# Patient Record
Sex: FEMALE | Race: WHITE | Employment: UNEMPLOYED | URBAN - METROPOLITAN AREA
[De-identification: names, ages, dates, MRNs, and addresses within clinical notes are randomized per-mention and may not be internally consistent; named-entity substitution may affect disease eponyms.]

---

## 2020-05-04 ENCOUNTER — OFFICE VISIT (OUTPATIENT)
Dept: SURGERY | Facility: CLINIC | Age: 40
End: 2020-05-04
Payer: COMMERCIAL

## 2020-05-04 VITALS — DIASTOLIC BLOOD PRESSURE: 97 MMHG | HEART RATE: 89 BPM | SYSTOLIC BLOOD PRESSURE: 185 MMHG

## 2020-05-04 DIAGNOSIS — N20.0 RENAL CALCULUS, LEFT: ICD-10-CM

## 2020-05-04 DIAGNOSIS — N13.30 HYDRONEPHROSIS OF LEFT KIDNEY: ICD-10-CM

## 2020-05-04 DIAGNOSIS — R82.90 URINE FINDING: Primary | ICD-10-CM

## 2020-05-04 PROCEDURE — 99203 OFFICE O/P NEW LOW 30 MIN: CPT | Performed by: UROLOGY

## 2020-05-04 PROCEDURE — 81003 URINALYSIS AUTO W/O SCOPE: CPT | Performed by: UROLOGY

## 2020-05-04 RX ORDER — INSULIN DETEMIR 100 [IU]/ML
INJECTION, SOLUTION SUBCUTANEOUS
COMMUNITY
Start: 2020-03-16 | End: 2020-07-10

## 2020-05-04 RX ORDER — HYDROCHLOROTHIAZIDE 25 MG/1
25 TABLET ORAL DAILY
COMMUNITY
Start: 2019-12-22 | End: 2020-08-17

## 2020-05-04 RX ORDER — LOSARTAN POTASSIUM 100 MG/1
TABLET ORAL
Status: ON HOLD | COMMUNITY
Start: 2019-12-22 | End: 2020-07-22

## 2020-05-04 RX ORDER — PEN NEEDLE, DIABETIC 32GX 5/32"
NEEDLE, DISPOSABLE MISCELLANEOUS
COMMUNITY
Start: 2020-03-15 | End: 2021-01-15

## 2020-05-04 RX ORDER — ERGOCALCIFEROL 1.25 MG/1
50000 CAPSULE ORAL
COMMUNITY
Start: 2020-02-24 | End: 2020-10-16

## 2020-05-04 NOTE — PROGRESS NOTES
Rooming Clinician:     Besys Burger is a 37year old female. Patient presents with:  Kidney Problem: staghorn kidney stone.   Nephrsotomy tube done 1/12/20  Kidney Stones / Ureteral Stone  Pain: No   left  Nausea: No    Vomiting: No    Previous [address] Smoking status: Not on file    Alcohol use: Not on file    Drug use: Not on file       REVIEW OF SYSTEMS:     GENERAL HEALTH: feels well otherwise  SKIN: denies any unusual skin lesions or rashes  RESPIRATORY: denies shortness of breath with exertion  CA

## 2020-05-07 ENCOUNTER — TELEPHONE (OUTPATIENT)
Dept: SURGERY | Facility: CLINIC | Age: 40
End: 2020-05-07

## 2020-05-07 RX ORDER — CIPROFLOXACIN 500 MG/1
500 TABLET, FILM COATED ORAL 2 TIMES DAILY
Qty: 20 TABLET | Refills: 0 | Status: SHIPPED | OUTPATIENT
Start: 2020-05-07 | End: 2020-05-17

## 2020-05-07 NOTE — TELEPHONE ENCOUNTER
Spoke with patient using the language line for interpretation Twyla Pretty DV#[id number]). Informed pt that her urine culture from her nephrostomy tube was positive for infection. Verified she is not allergic to Cipro.   She needs to start Cipro 500 mg 1 twice/day fo

## 2020-05-07 NOTE — TELEPHONE ENCOUNTER
----- Message from Alexander Horton MD sent at 5/7/2020 12:23 PM CDT -----  Your recent the nephrostomy tube shows E. coli bacteria and Is abnormal.  Recommend ciprofloxacin 500 mg 1 p.o twice daily for 10 days.     Sincerely,  Loretta Chan MD

## 2020-05-11 ENCOUNTER — HOSPITAL ENCOUNTER (OUTPATIENT)
Dept: CT IMAGING | Facility: HOSPITAL | Age: 40
Discharge: HOME | End: 2020-05-11
Attending: UROLOGY
Payer: COMMERCIAL

## 2020-05-11 ENCOUNTER — TELEPHONE (OUTPATIENT)
Dept: SURGERY | Facility: CLINIC | Age: 40
End: 2020-05-11

## 2020-05-11 DIAGNOSIS — N20.0 RENAL CALCULUS, LEFT: ICD-10-CM

## 2020-05-11 DIAGNOSIS — N13.30 HYDRONEPHROSIS OF LEFT KIDNEY: ICD-10-CM

## 2020-05-11 PROCEDURE — 76377 3D RENDER W/INTRP POSTPROCES: CPT | Performed by: UROLOGY

## 2020-05-11 PROCEDURE — 82565 ASSAY OF CREATININE: CPT

## 2020-05-11 PROCEDURE — 74178 CT ABD&PLV WO CNTR FLWD CNTR: CPT | Performed by: UROLOGY

## 2020-05-11 NOTE — TELEPHONE ENCOUNTER
Phoned patient to relay MD's message and recommendations, \"Your recent CT urogram shows a very large left renal staghorn calculus with other areas of calcification within the left kidney as well as a cyst in the upper pole of the left kidney is abnormal.

## 2020-05-18 ENCOUNTER — OFFICE VISIT (OUTPATIENT)
Dept: SURGERY | Facility: CLINIC | Age: 40
End: 2020-05-18
Payer: COMMERCIAL

## 2020-05-18 ENCOUNTER — TELEPHONE (OUTPATIENT)
Dept: SURGERY | Facility: CLINIC | Age: 40
End: 2020-05-18

## 2020-05-18 VITALS — TEMPERATURE: 99 F | HEART RATE: 77 BPM | SYSTOLIC BLOOD PRESSURE: 147 MMHG | DIASTOLIC BLOOD PRESSURE: 73 MMHG

## 2020-05-18 DIAGNOSIS — N20.0 RENAL CALCULUS, LEFT: Primary | ICD-10-CM

## 2020-05-18 PROCEDURE — 99213 OFFICE O/P EST LOW 20 MIN: CPT | Performed by: UROLOGY

## 2020-05-18 RX ORDER — CIPROFLOXACIN 500 MG/1
TABLET, FILM COATED ORAL 2 TIMES DAILY
COMMUNITY
End: 2020-07-13

## 2020-05-18 NOTE — TELEPHONE ENCOUNTER
JEANNETTE at [address] [place] [address] to coordinate care for this patient. She needs to see a pcp in the [place] system. She needs a nuclear renal scan and also a surgical procedure Left PCNL scheduled.

## 2020-05-18 NOTE — PROGRESS NOTES
Rooming Clinician:     Alexis Choe is a 37year old female. Patient presents with: Follow - Up: to discuss CT result; left nephrostomy tube      HPI:     Patient returns with her  who is her .   I have been able to rev and denies heartburn  : see HPI  NEURO: no sensory or motor complaint    EXAM:     /73 (BP Location: Right arm, Patient Position: Sitting, Cuff Size: large)   Pulse 77   Temp 98.9 °F (37.2 °C) (Oral)   GENERAL: well developed, well nourished,in no measures 2.2 cm. Left perinephric fluid is noted. ADRENALS:  No mass or enlargement. URINARY BLADDER:  Bladder is unremarkable. URETERS:  No filling defects. LIVER:  No enlargement, atrophy, abnormal density, or significant focal lesion.   BILIARY:  No v interventional radiologist just prior to the procedure.   At the time of the procedure the percutaneous nephrostomy site is enlarged in order to pass a larger nephroscope into the kidney to visualize the stone and fragment the stone into pieces using variou

## 2020-05-19 ENCOUNTER — TELEPHONE (OUTPATIENT)
Dept: SURGERY | Facility: CLINIC | Age: 40
End: 2020-05-19

## 2020-05-19 DIAGNOSIS — N20.0 KIDNEY STONES: Primary | ICD-10-CM

## 2020-05-19 NOTE — TELEPHONE ENCOUNTER
Per Noe TIERNEY renal DMSA spect test ordered yesterday is not done at [address] [place] [address] or [facility], states this test stopped being done many years ago, asking for other type of test to be ordered. Please advise thank you.

## 2020-05-19 NOTE — TELEPHONE ENCOUNTER
Spoke with Cecy Aguilar at [facility] [phone]. He was informed that the patient needs a pcp associated with [facility] for medical clearance prior to scheduling her surgery. Verbalized understanding. He will call back with this information.

## 2020-05-20 NOTE — TELEPHONE ENCOUNTER
Spoke with Alexys Sanchez at [facility]. He is setting the patient up with a pcp at [facility] and will let us know when they decide. I told him we would get back to him about the nuclear scan that Dr She Gunter ordered which they do not do anymore.   Waiting to hear

## 2020-05-20 NOTE — TELEPHONE ENCOUNTER
Christiano with [facility] is requesting to speak to Salvatore to set up care coordination.  Please advise

## 2020-06-01 NOTE — TELEPHONE ENCOUNTER
I pended an order for a NM lasix renogram. Please sign the order if you approve. Tasked to [employer]

## 2020-06-16 ENCOUNTER — HOSPITAL ENCOUNTER (OUTPATIENT)
Dept: NUCLEAR MEDICINE | Facility: HOSPITAL | Age: 40
End: 2020-06-16
Attending: UROLOGY
Payer: COMMERCIAL

## 2020-06-17 ENCOUNTER — HOSPITAL ENCOUNTER (OUTPATIENT)
Dept: NUCLEAR MEDICINE | Facility: HOSPITAL | Age: 40
Discharge: HOME | End: 2020-06-17
Attending: UROLOGY
Payer: COMMERCIAL

## 2020-06-17 DIAGNOSIS — N20.0 KIDNEY STONES: ICD-10-CM

## 2020-06-17 PROCEDURE — 78708 K FLOW/FUNCT IMAGE W/DRUG: CPT | Performed by: UROLOGY

## 2020-06-17 RX ORDER — FUROSEMIDE 10 MG/ML
INJECTION INTRAMUSCULAR; INTRAVENOUS
Status: COMPLETED
Start: 2020-06-17 | End: 2020-06-17

## 2020-06-17 RX ORDER — FUROSEMIDE 10 MG/ML
40 INJECTION INTRAMUSCULAR; INTRAVENOUS ONCE
Status: COMPLETED | OUTPATIENT
Start: 2020-06-17 | End: 2020-06-17

## 2020-06-17 RX ADMIN — FUROSEMIDE 40 MG: 10 INJECTION INTRAMUSCULAR; INTRAVENOUS at 09:38:00

## 2020-06-18 ENCOUNTER — TELEPHONE (OUTPATIENT)
Dept: SURGERY | Facility: CLINIC | Age: 40
End: 2020-06-18

## 2020-06-18 NOTE — TELEPHONE ENCOUNTER
RN called the 's patient in response to his call and to arrange for an appointment he is requesting. Left message to call us back. RN also trying to reach this patient to relay the 6/5 NMR with Lasix result. No details given.  See result below:

## 2020-06-18 NOTE — TELEPHONE ENCOUNTER
Pt's  called. Pt has not been able to find a primary care physician in [place] due to the insurance. When should pt see Dr. She Gunter for an appointment.  Call to advise

## 2020-06-22 NOTE — TELEPHONE ENCOUNTER
Spoke to patient. Agreeable to set up and appointment on 6/25 Thursday at 1045am with Dr Cheryl Healy. RN to relay renal scan result but patient wanting to come to office and discuss it with MD instead.  Note, STIVEN Corado spoke to patient and interpreted her aaron

## 2020-06-25 ENCOUNTER — TELEPHONE (OUTPATIENT)
Dept: SURGERY | Facility: CLINIC | Age: 40
End: 2020-06-25

## 2020-06-25 ENCOUNTER — OFFICE VISIT (OUTPATIENT)
Dept: SURGERY | Facility: CLINIC | Age: 40
End: 2020-06-25
Payer: COMMERCIAL

## 2020-06-25 VITALS — HEART RATE: 90 BPM | DIASTOLIC BLOOD PRESSURE: 92 MMHG | SYSTOLIC BLOOD PRESSURE: 160 MMHG

## 2020-06-25 DIAGNOSIS — N20.0 RENAL CALCULUS, LEFT: ICD-10-CM

## 2020-06-25 DIAGNOSIS — R82.90 URINE FINDING: Primary | ICD-10-CM

## 2020-06-25 PROCEDURE — 99213 OFFICE O/P EST LOW 20 MIN: CPT | Performed by: UROLOGY

## 2020-06-25 PROCEDURE — 81003 URINALYSIS AUTO W/O SCOPE: CPT | Performed by: UROLOGY

## 2020-06-25 NOTE — PROGRESS NOTES
Rooming Clinician:     Brittney Acosta is a 37year old female. Patient presents with:   Follow - Up: Patient presents today for test results    [id number]      HPI:     Patient comes back to the office with her  and vi reviewed. No pertinent surgical history.    Social History:  Social History    Tobacco Use      Smoking status: Not on file    Alcohol use: Not on file    Drug use: Not on file       REVIEW OF SYSTEMS:     GENERAL HEALTH: feels well otherwise  SKIN: denies CONCLUSION:  1. Asymmetric findings consistent with compromised function of the left kidney. 2. A left nephrostomy tube is noted. There is no hydronephrosis on either side. 3. Continued clinical correlation recommended.     Dictated by: Travis Montenegro MD on 6

## 2020-06-25 NOTE — TELEPHONE ENCOUNTER
----- Message from Rashida Skinner MD sent at 6/18/2020 11:10 AM CDT -----  Your recent nuclear renal scan shows that there is significant decrease in function of the left kidney. 74% of total renal output based on the right kidney only 26% on the left.

## 2020-06-26 ENCOUNTER — TELEPHONE (OUTPATIENT)
Dept: SURGERY | Facility: CLINIC | Age: 40
End: 2020-06-26

## 2020-06-26 DIAGNOSIS — N20.0 KIDNEY STONE ON LEFT SIDE: Primary | ICD-10-CM

## 2020-06-26 NOTE — TELEPHONE ENCOUNTER
Scheduled July 22, 2020 at [facility] Percutaneous left nephrostolithotripsy. Need to contact [phone] and Patient. Patient will need voided urine culture and from left nephromostomy tube 7 days prior.

## 2020-06-29 NOTE — TELEPHONE ENCOUNTER
Spoke to Patient adv surgery is scheduled for 07/22/20 went over instructions.   Patient verbalized understanding

## 2020-06-30 RX ORDER — CIPROFLOXACIN 500 MG/1
500 TABLET, FILM COATED ORAL 2 TIMES DAILY
Qty: 20 TABLET | Refills: 1 | Status: SHIPPED | OUTPATIENT
Start: 2020-06-30 | End: 2020-07-13

## 2020-07-01 ENCOUNTER — TELEPHONE (OUTPATIENT)
Dept: SURGERY | Facility: CLINIC | Age: 40
End: 2020-07-01

## 2020-07-01 NOTE — TELEPHONE ENCOUNTER
----- Message from Sridhar Singh MD sent at 6/30/2020 11:12 AM CDT -----  Your recent urine culture shows E. coli and is abnormal.  Recommend ciprofloxacin 500 mg 1 p.o. twice daily for 10 days.   Culture will need to be repeated from both the voided ur

## 2020-07-01 NOTE — TELEPHONE ENCOUNTER
Spoke with patient thru the language line. Interpreting was [place] #[id number]. I informed her that her urine culture was positive for infection and she needs to start Cipro 500 mg 1 twice/day for 10 days. Answered questions. Verbalized understanding.

## 2020-07-02 ENCOUNTER — TELEPHONE (OUTPATIENT)
Dept: SURGERY | Facility: CLINIC | Age: 40
End: 2020-07-02

## 2020-07-02 DIAGNOSIS — N20.0 KIDNEY STONE: Primary | ICD-10-CM

## 2020-07-10 PROBLEM — I10 ESSENTIAL HYPERTENSION: Status: ACTIVE | Noted: 2020-07-10

## 2020-07-10 PROBLEM — Z79.4 CONTROLLED TYPE 2 DIABETES MELLITUS WITH HYPERGLYCEMIA, WITH LONG-TERM CURRENT USE OF INSULIN (HCC): Status: ACTIVE | Noted: 2020-07-10

## 2020-07-10 PROBLEM — E11.65 CONTROLLED TYPE 2 DIABETES MELLITUS WITH HYPERGLYCEMIA, WITH LONG-TERM CURRENT USE OF INSULIN (HCC): Status: ACTIVE | Noted: 2020-07-10

## 2020-07-10 RX ORDER — AMLODIPINE BESYLATE 10 MG/1
10 TABLET ORAL DAILY
COMMUNITY
Start: 2020-05-12 | End: 2020-08-17

## 2020-07-11 NOTE — ASSESSMENT & PLAN NOTE
As for her Diabetes, it is no significant medication side effects noted, borderline controlled. Recommendations are: continue present meds, lose weight by increased dietary compliance and exercise and will check labs as ordered.   Working on Zohra Adams

## 2020-07-11 NOTE — ASSESSMENT & PLAN NOTE
Elevated today, white coat syndrome as source, but will add metroprolol 12.5 dasily for now and for 2 weeks post procedure.    Blood Pressure and Cardiac Medications          amLODIPine Besylate 10 MG Oral Tab    losartan 100 MG Oral Tab

## 2020-07-13 ENCOUNTER — APPOINTMENT (OUTPATIENT)
Dept: LAB | Facility: HOSPITAL | Age: 40
End: 2020-07-13
Attending: FAMILY MEDICINE
Payer: COMMERCIAL

## 2020-07-13 ENCOUNTER — NURSE ONLY (OUTPATIENT)
Dept: SURGERY | Facility: CLINIC | Age: 40
End: 2020-07-13
Payer: COMMERCIAL

## 2020-07-13 ENCOUNTER — OFFICE VISIT (OUTPATIENT)
Dept: FAMILY MEDICINE CLINIC | Facility: CLINIC | Age: 40
End: 2020-07-13
Payer: COMMERCIAL

## 2020-07-13 VITALS
HEIGHT: 65.75 IN | TEMPERATURE: 99 F | WEIGHT: 181 LBS | DIASTOLIC BLOOD PRESSURE: 80 MMHG | SYSTOLIC BLOOD PRESSURE: 141 MMHG | BODY MASS INDEX: 29.44 KG/M2 | HEART RATE: 81 BPM | RESPIRATION RATE: 16 BRPM

## 2020-07-13 DIAGNOSIS — I10 ESSENTIAL HYPERTENSION: ICD-10-CM

## 2020-07-13 DIAGNOSIS — N20.0 KIDNEY STONE: Primary | ICD-10-CM

## 2020-07-13 DIAGNOSIS — E78.2 MIXED HYPERLIPIDEMIA: ICD-10-CM

## 2020-07-13 DIAGNOSIS — E11.65 CONTROLLED TYPE 2 DIABETES MELLITUS WITH HYPERGLYCEMIA, WITH LONG-TERM CURRENT USE OF INSULIN (HCC): ICD-10-CM

## 2020-07-13 DIAGNOSIS — N20.0 LEFT NEPHROLITHIASIS: ICD-10-CM

## 2020-07-13 DIAGNOSIS — Z13.29 SCREENING FOR THYROID DISORDER: ICD-10-CM

## 2020-07-13 DIAGNOSIS — Z01.818 PREOPERATIVE CLEARANCE: ICD-10-CM

## 2020-07-13 DIAGNOSIS — Z79.4 CONTROLLED TYPE 2 DIABETES MELLITUS WITH HYPERGLYCEMIA, WITH LONG-TERM CURRENT USE OF INSULIN (HCC): ICD-10-CM

## 2020-07-13 DIAGNOSIS — N20.0 LEFT NEPHROLITHIASIS: Primary | ICD-10-CM

## 2020-07-13 LAB
ALBUMIN SERPL-MCNC: 3.6 G/DL (ref 3.4–5)
ALBUMIN/GLOB SERPL: 0.8 {RATIO} (ref 1–2)
ALP LIVER SERPL-CCNC: 68 U/L (ref 37–98)
ALT SERPL-CCNC: 19 U/L (ref 13–56)
ANION GAP SERPL CALC-SCNC: 7 MMOL/L (ref 0–18)
AST SERPL-CCNC: 9 U/L (ref 15–37)
ATRIAL RATE: 73 BPM
BASOPHILS # BLD AUTO: 0.07 X10(3) UL (ref 0–0.2)
BASOPHILS NFR BLD AUTO: 0.7 %
BILIRUB SERPL-MCNC: 0.2 MG/DL (ref 0.1–2)
BUN BLD-MCNC: 20 MG/DL (ref 7–18)
BUN/CREAT SERPL: 20.8 (ref 10–20)
CALCIUM BLD-MCNC: 9.3 MG/DL (ref 8.5–10.1)
CHLORIDE SERPL-SCNC: 103 MMOL/L (ref 98–112)
CHOLEST SMN-MCNC: 190 MG/DL (ref ?–200)
CO2 SERPL-SCNC: 26 MMOL/L (ref 21–32)
CREAT BLD-MCNC: 0.96 MG/DL (ref 0.55–1.02)
CREAT UR-SCNC: 40.3 MG/DL
DEPRECATED RDW RBC AUTO: 46.2 FL (ref 35.1–46.3)
EOSINOPHIL # BLD AUTO: 0.42 X10(3) UL (ref 0–0.7)
EOSINOPHIL NFR BLD AUTO: 4.2 %
ERYTHROCYTE [DISTWIDTH] IN BLOOD BY AUTOMATED COUNT: 14.6 % (ref 11–15)
EST. AVERAGE GLUCOSE BLD GHB EST-MCNC: 197 MG/DL (ref 68–126)
GLOBULIN PLAS-MCNC: 4.8 G/DL (ref 2.8–4.4)
GLUCOSE BLD-MCNC: 169 MG/DL (ref 70–99)
HBA1C MFR BLD HPLC: 8.5 % (ref ?–5.7)
HCT VFR BLD AUTO: 40.2 % (ref 35–48)
HDLC SERPL-MCNC: 36 MG/DL (ref 40–59)
HGB BLD-MCNC: 12.9 G/DL (ref 12–16)
IMM GRANULOCYTES # BLD AUTO: 0.05 X10(3) UL (ref 0–1)
IMM GRANULOCYTES NFR BLD: 0.5 %
LDLC SERPL CALC-MCNC: 98 MG/DL (ref ?–100)
LYMPHOCYTES # BLD AUTO: 2.34 X10(3) UL (ref 1–4)
LYMPHOCYTES NFR BLD AUTO: 23.1 %
M PROTEIN MFR SERPL ELPH: 8.4 G/DL (ref 6.4–8.2)
MCH RBC QN AUTO: 27.7 PG (ref 26–34)
MCHC RBC AUTO-ENTMCNC: 32.1 G/DL (ref 31–37)
MCV RBC AUTO: 86.5 FL (ref 80–100)
MICROALBUMIN UR-MCNC: 66.2 MG/DL
MICROALBUMIN/CREAT 24H UR-RTO: 1642.7 UG/MG (ref ?–30)
MONOCYTES # BLD AUTO: 0.75 X10(3) UL (ref 0.1–1)
MONOCYTES NFR BLD AUTO: 7.4 %
NEUTROPHILS # BLD AUTO: 6.49 X10 (3) UL (ref 1.5–7.7)
NEUTROPHILS # BLD AUTO: 6.49 X10(3) UL (ref 1.5–7.7)
NEUTROPHILS NFR BLD AUTO: 64.1 %
NONHDLC SERPL-MCNC: 154 MG/DL (ref ?–130)
OSMOLALITY SERPL CALC.SUM OF ELEC: 289 MOSM/KG (ref 275–295)
P AXIS: 22 DEGREES
P-R INTERVAL: 138 MS
PATIENT FASTING Y/N/NP: YES
PATIENT FASTING Y/N/NP: YES
PLATELET # BLD AUTO: 333 10(3)UL (ref 150–450)
POTASSIUM SERPL-SCNC: 3.8 MMOL/L (ref 3.5–5.1)
Q-T INTERVAL: 396 MS
QRS DURATION: 90 MS
QTC CALCULATION (BEZET): 436 MS
R AXIS: -8 DEGREES
RBC # BLD AUTO: 4.65 X10(6)UL (ref 3.8–5.3)
SODIUM SERPL-SCNC: 136 MMOL/L (ref 136–145)
T AXIS: 69 DEGREES
TRIGL SERPL-MCNC: 279 MG/DL (ref 30–149)
TSI SER-ACNC: 1.69 MIU/ML (ref 0.36–3.74)
VENTRICULAR RATE: 73 BPM
VLDLC SERPL CALC-MCNC: 56 MG/DL (ref 0–30)
WBC # BLD AUTO: 10.1 X10(3) UL (ref 4–11)

## 2020-07-13 PROCEDURE — 80053 COMPREHEN METABOLIC PANEL: CPT

## 2020-07-13 PROCEDURE — 80061 LIPID PANEL: CPT

## 2020-07-13 PROCEDURE — 83036 HEMOGLOBIN GLYCOSYLATED A1C: CPT

## 2020-07-13 PROCEDURE — 82570 ASSAY OF URINE CREATININE: CPT

## 2020-07-13 PROCEDURE — 85025 COMPLETE CBC W/AUTO DIFF WBC: CPT

## 2020-07-13 PROCEDURE — 84443 ASSAY THYROID STIM HORMONE: CPT

## 2020-07-13 PROCEDURE — 93005 ELECTROCARDIOGRAM TRACING: CPT

## 2020-07-13 PROCEDURE — 36415 COLL VENOUS BLD VENIPUNCTURE: CPT

## 2020-07-13 PROCEDURE — 82043 UR ALBUMIN QUANTITATIVE: CPT

## 2020-07-13 PROCEDURE — 99243 OFF/OP CNSLTJ NEW/EST LOW 30: CPT | Performed by: FAMILY MEDICINE

## 2020-07-13 PROCEDURE — 93010 ELECTROCARDIOGRAM REPORT: CPT | Performed by: INTERNAL MEDICINE

## 2020-07-13 PROCEDURE — 51700 IRRIGATION OF BLADDER: CPT | Performed by: UROLOGY

## 2020-07-13 RX ORDER — METOPROLOL SUCCINATE 25 MG/1
12.5 TABLET, EXTENDED RELEASE ORAL DAILY
Qty: 15 TABLET | Refills: 0 | Status: SHIPPED | OUTPATIENT
Start: 2020-07-13 | End: 2020-08-12

## 2020-07-13 NOTE — PATIENT INSTRUCTIONS
Levemir 25 units, just taek 12 units night before and morning of procedure, and hold insulin meal time day of procedure.     Medication Sig   • amLODIPine Besylate 10 MG Oral Tab OK to continue   • Insulin Aspart Pen (NOVOLOG FLEXPEN) 100 UNIT/ML Subcutaneo

## 2020-07-13 NOTE — PROGRESS NOTES
New pt, out of work 6 months because of worse kidney stones starting with infections in January, ff work as an  since. Previous doctor no longer covered.  Dr Marina Estrella in [place] was previous doctor

## 2020-07-14 NOTE — H&P
Terri Wilson is a 37year old female who presents for a pre-operative physical exam at the request of Dr. Elliot Parsons for evaluation of preoperative risk.  Patient is to have left kidney stone removal, to be done by Dr. Elliot Parsons  at [facility] on 7/2 07/13/2020 11:47 AM    HCT 40.2 07/13/2020 11:47 AM    .0 07/13/2020 11:47 AM         CMP  (most recent labs)   Lab Results   Component Value Date/Time     (H) 07/13/2020 11:47 AM    BUN 20 (H) 07/13/2020 11:47 AM    CREATSERUM 0.96 07/13/202 losartan 100 MG Oral Tab                 Relevant Medications    amLODIPine Besylate 10 MG Oral Tab    Metoprolol Succinate ER 25 MG Oral Tablet 24 Hr    Other Relevant Orders    EKG 12-LEAD (Completed)    Mixed hyperlipidemia    Overview     LDL 98, trigs FREE T4 (Completed)       reasoabble control of Diabetes Mellitus, ok for surgery, but Therapuetic Lifestyle Change recommended, follow up in 1 months,   Half levelir noight before and day of, and reassess soon  BP elevated, may be white coat, but will add

## 2020-07-15 RX ORDER — SODIUM CHLORIDE, SODIUM LACTATE, POTASSIUM CHLORIDE, CALCIUM CHLORIDE 600; 310; 30; 20 MG/100ML; MG/100ML; MG/100ML; MG/100ML
INJECTION, SOLUTION INTRAVENOUS CONTINUOUS
Status: CANCELLED | OUTPATIENT
Start: 2020-07-15

## 2020-07-15 RX ORDER — ACETAMINOPHEN 500 MG
1000 TABLET ORAL ONCE
Status: CANCELLED | OUTPATIENT
Start: 2020-07-15 | End: 2020-07-15

## 2020-07-17 ENCOUNTER — TELEPHONE (OUTPATIENT)
Dept: SURGERY | Facility: CLINIC | Age: 40
End: 2020-07-17

## 2020-07-17 NOTE — TELEPHONE ENCOUNTER
----- Message from Roseann Conde MD sent at 7/15/2020  9:15 PM CDT -----  Your recent Urine culture shows E. Coli from urine. Nephrostomy tube is negative. is abnormal.  Recommend Keflex 500 mg one POQ ID for 14 days.     Sincerely,  Jessica Farah MD

## 2020-07-17 NOTE — TELEPHONE ENCOUNTER
RN called this patient to relay MD's message and recommendations, \"Your recent Urine culture shows E. Coli from urine. Nephrostomy tube is negative. is abnormal.  Recommend Keflex 500 mg one POQ ID for 14 days.  \" Patient agreeable and verbalized Lolita Appiah

## 2020-07-20 ENCOUNTER — LAB ENCOUNTER (OUTPATIENT)
Dept: LAB | Facility: HOSPITAL | Age: 40
End: 2020-07-20
Attending: UROLOGY
Payer: OTHER GOVERNMENT

## 2020-07-20 DIAGNOSIS — N20.0 KIDNEY STONE ON LEFT SIDE: ICD-10-CM

## 2020-07-20 RX ORDER — CEPHALEXIN 500 MG/1
500 CAPSULE ORAL 2 TIMES DAILY
Qty: 14 CAPSULE | Refills: 0 | Status: SHIPPED | OUTPATIENT
Start: 2020-07-20 | End: 2020-07-27

## 2020-07-20 NOTE — TELEPHONE ENCOUNTER
Spoke with spouse and answered questions. Rx for Keflex was resent to pharmacy. Verbalized understanding.

## 2020-07-21 LAB — SARS-COV-2 RNA RESP QL NAA+PROBE: NOT DETECTED

## 2020-07-21 NOTE — H&P
Patient was seen in the office with her  and via a  we discussed the results of the nuclear renal scan which show a 26% function from the left kidney recently.   Therefore most of the renal clearance is occurring from the righ on file         REVIEW OF SYSTEMS:      GENERAL HEALTH: feels well otherwise  SKIN: denies any unusual skin lesions or rashes  RESPIRATORY: denies shortness of breath with exertion  CARDIOVASCULAR: denies chest pain on exertion  GI: denies abdominal pain a either side. 3. Continued clinical correlation recommended.     Dictated by: Galina Gallagher MD on 6/17/2020 at 11:10 AM     Finalized by: Galina Gallagher MD on 6/17/2020 at 11:15 AM              ASSESSMENT:      Left renal staghorn calculus with percutaneous left

## 2020-07-22 ENCOUNTER — ANESTHESIA (OUTPATIENT)
Dept: SURGERY | Facility: HOSPITAL | Age: 40
End: 2020-07-22
Payer: COMMERCIAL

## 2020-07-22 ENCOUNTER — TELEPHONE (OUTPATIENT)
Dept: SURGERY | Facility: CLINIC | Age: 40
End: 2020-07-22

## 2020-07-22 ENCOUNTER — HOSPITAL ENCOUNTER (OUTPATIENT)
Dept: INTERVENTIONAL RADIOLOGY/VASCULAR | Facility: HOSPITAL | Age: 40
Discharge: HOME | End: 2020-07-22
Attending: UROLOGY
Payer: COMMERCIAL

## 2020-07-22 ENCOUNTER — APPOINTMENT (OUTPATIENT)
Dept: GENERAL RADIOLOGY | Facility: HOSPITAL | Age: 40
End: 2020-07-22
Attending: UROLOGY
Payer: COMMERCIAL

## 2020-07-22 ENCOUNTER — HOSPITAL ENCOUNTER (OUTPATIENT)
Facility: HOSPITAL | Age: 40
Discharge: HOME | End: 2020-07-23
Attending: UROLOGY | Admitting: UROLOGY
Payer: COMMERCIAL

## 2020-07-22 ENCOUNTER — ANESTHESIA EVENT (OUTPATIENT)
Dept: SURGERY | Facility: HOSPITAL | Age: 40
End: 2020-07-22
Payer: COMMERCIAL

## 2020-07-22 DIAGNOSIS — N20.0 KIDNEY STONE ON LEFT SIDE: Primary | ICD-10-CM

## 2020-07-22 DIAGNOSIS — N20.0 KIDNEY STONE ON LEFT SIDE: ICD-10-CM

## 2020-07-22 LAB
ANION GAP SERPL CALC-SCNC: 9 MMOL/L (ref 0–18)
BUN BLD-MCNC: 14 MG/DL (ref 7–18)
BUN/CREAT SERPL: 16.1 (ref 10–20)
CALCIUM BLD-MCNC: 7.9 MG/DL (ref 8.5–10.1)
CHLORIDE SERPL-SCNC: 107 MMOL/L (ref 98–112)
CO2 SERPL-SCNC: 20 MMOL/L (ref 21–32)
CREAT BLD-MCNC: 0.87 MG/DL (ref 0.55–1.02)
GLUCOSE BLD-MCNC: 157 MG/DL (ref 70–99)
GLUCOSE BLD-MCNC: 157 MG/DL (ref 70–99)
GLUCOSE BLD-MCNC: 173 MG/DL (ref 70–99)
GLUCOSE BLD-MCNC: 208 MG/DL (ref 70–99)
GLUCOSE BLD-MCNC: 237 MG/DL (ref 70–99)
OSMOLALITY SERPL CALC.SUM OF ELEC: 286 MOSM/KG (ref 275–295)
POCT LOT NUMBER: NORMAL
POCT URINE PREGNANCY: NEGATIVE
POTASSIUM SERPL-SCNC: 3.9 MMOL/L (ref 3.5–5.1)
SODIUM SERPL-SCNC: 136 MMOL/L (ref 136–145)

## 2020-07-22 PROCEDURE — 99219 INITIAL OBSERVATION CARE,LEVL II: CPT | Performed by: STUDENT IN AN ORGANIZED HEALTH CARE EDUCATION/TRAINING PROGRAM

## 2020-07-22 PROCEDURE — 50436 DILAT XST TRC NDURLGC PX: CPT

## 2020-07-22 PROCEDURE — 0TC13ZZ EXTIRPATION OF MATTER FROM LEFT KIDNEY, PERCUTANEOUS APPROACH: ICD-10-PCS | Performed by: UROLOGY

## 2020-07-22 PROCEDURE — 50080 PERQ NL/PL LITHOTRP SMPL<2CM: CPT | Performed by: UROLOGY

## 2020-07-22 PROCEDURE — 99152 MOD SED SAME PHYS/QHP 5/>YRS: CPT

## 2020-07-22 PROCEDURE — 76000 FLUOROSCOPY <1 HR PHYS/QHP: CPT | Performed by: UROLOGY

## 2020-07-22 PROCEDURE — 50432 PLMT NEPHROSTOMY CATHETER: CPT | Performed by: UROLOGY

## 2020-07-22 PROCEDURE — 0T773DZ DILATION OF LEFT URETER WITH INTRALUMINAL DEVICE, PERCUTANEOUS APPROACH: ICD-10-PCS | Performed by: RADIOLOGY

## 2020-07-22 PROCEDURE — 50434 CONVERT NEPHROSTOMY CATHETER: CPT

## 2020-07-22 PROCEDURE — 0T9100Z DRAINAGE OF LEFT KIDNEY WITH DRAINAGE DEVICE, OPEN APPROACH: ICD-10-PCS | Performed by: UROLOGY

## 2020-07-22 RX ORDER — ACETAMINOPHEN 500 MG
1000 TABLET ORAL ONCE
Status: DISCONTINUED | OUTPATIENT
Start: 2020-07-22 | End: 2020-07-22 | Stop reason: HOSPADM

## 2020-07-22 RX ORDER — GLYCOPYRROLATE 0.2 MG/ML
INJECTION, SOLUTION INTRAMUSCULAR; INTRAVENOUS AS NEEDED
Status: DISCONTINUED | OUTPATIENT
Start: 2020-07-22 | End: 2020-07-22 | Stop reason: SURG

## 2020-07-22 RX ORDER — HYDROCODONE BITARTRATE AND ACETAMINOPHEN 5; 325 MG/1; MG/1
2 TABLET ORAL AS NEEDED
Status: DISCONTINUED | OUTPATIENT
Start: 2020-07-22 | End: 2020-07-22 | Stop reason: HOSPADM

## 2020-07-22 RX ORDER — ACETAMINOPHEN 325 MG/1
650 TABLET ORAL EVERY 6 HOURS PRN
Status: DISCONTINUED | OUTPATIENT
Start: 2020-07-22 | End: 2020-07-23

## 2020-07-22 RX ORDER — ONDANSETRON 2 MG/ML
INJECTION INTRAMUSCULAR; INTRAVENOUS AS NEEDED
Status: DISCONTINUED | OUTPATIENT
Start: 2020-07-22 | End: 2020-07-22 | Stop reason: SURG

## 2020-07-22 RX ORDER — ERGOCALCIFEROL 1.25 MG/1
50000 CAPSULE ORAL
Status: DISCONTINUED | OUTPATIENT
Start: 2020-07-22 | End: 2020-07-23

## 2020-07-22 RX ORDER — MORPHINE SULFATE 4 MG/ML
6 INJECTION, SOLUTION INTRAMUSCULAR; INTRAVENOUS EVERY 2 HOUR PRN
Status: DISCONTINUED | OUTPATIENT
Start: 2020-07-22 | End: 2020-07-23

## 2020-07-22 RX ORDER — DIPHENHYDRAMINE HYDROCHLORIDE 50 MG/ML
12.5 INJECTION INTRAMUSCULAR; INTRAVENOUS AS NEEDED
Status: DISCONTINUED | OUTPATIENT
Start: 2020-07-22 | End: 2020-07-22 | Stop reason: HOSPADM

## 2020-07-22 RX ORDER — CEFAZOLIN SODIUM/WATER 2 G/20 ML
SYRINGE (ML) INTRAVENOUS
Status: DISPENSED
Start: 2020-07-22 | End: 2020-07-22

## 2020-07-22 RX ORDER — LEVOFLOXACIN 5 MG/ML
INJECTION, SOLUTION INTRAVENOUS
Status: COMPLETED
Start: 2020-07-22 | End: 2020-07-22

## 2020-07-22 RX ORDER — SODIUM CHLORIDE, SODIUM LACTATE, POTASSIUM CHLORIDE, CALCIUM CHLORIDE 600; 310; 30; 20 MG/100ML; MG/100ML; MG/100ML; MG/100ML
INJECTION, SOLUTION INTRAVENOUS CONTINUOUS
Status: DISCONTINUED | OUTPATIENT
Start: 2020-07-22 | End: 2020-07-22 | Stop reason: HOSPADM

## 2020-07-22 RX ORDER — HYDROCODONE BITARTRATE AND ACETAMINOPHEN 5; 325 MG/1; MG/1
1 TABLET ORAL AS NEEDED
Status: DISCONTINUED | OUTPATIENT
Start: 2020-07-22 | End: 2020-07-22 | Stop reason: HOSPADM

## 2020-07-22 RX ORDER — HEPARIN SODIUM 5000 [USP'U]/ML
5000 INJECTION, SOLUTION INTRAVENOUS; SUBCUTANEOUS EVERY 8 HOURS SCHEDULED
Status: DISCONTINUED | OUTPATIENT
Start: 2020-07-22 | End: 2020-07-23

## 2020-07-22 RX ORDER — DEXTROSE MONOHYDRATE 25 G/50ML
50 INJECTION, SOLUTION INTRAVENOUS
Status: DISCONTINUED | OUTPATIENT
Start: 2020-07-22 | End: 2020-07-22 | Stop reason: HOSPADM

## 2020-07-22 RX ORDER — ROCURONIUM BROMIDE 10 MG/ML
INJECTION, SOLUTION INTRAVENOUS AS NEEDED
Status: DISCONTINUED | OUTPATIENT
Start: 2020-07-22 | End: 2020-07-22 | Stop reason: SURG

## 2020-07-22 RX ORDER — HYDROMORPHONE HYDROCHLORIDE 1 MG/ML
0.4 INJECTION, SOLUTION INTRAMUSCULAR; INTRAVENOUS; SUBCUTANEOUS EVERY 5 MIN PRN
Status: DISCONTINUED | OUTPATIENT
Start: 2020-07-22 | End: 2020-07-22 | Stop reason: HOSPADM

## 2020-07-22 RX ORDER — NALOXONE HYDROCHLORIDE 0.4 MG/ML
80 INJECTION, SOLUTION INTRAMUSCULAR; INTRAVENOUS; SUBCUTANEOUS AS NEEDED
Status: DISCONTINUED | OUTPATIENT
Start: 2020-07-22 | End: 2020-07-22 | Stop reason: HOSPADM

## 2020-07-22 RX ORDER — MORPHINE SULFATE 4 MG/ML
2 INJECTION, SOLUTION INTRAMUSCULAR; INTRAVENOUS EVERY 2 HOUR PRN
Status: DISCONTINUED | OUTPATIENT
Start: 2020-07-22 | End: 2020-07-23

## 2020-07-22 RX ORDER — HYDROCODONE BITARTRATE AND ACETAMINOPHEN 5; 325 MG/1; MG/1
2 TABLET ORAL EVERY 4 HOURS PRN
Status: DISCONTINUED | OUTPATIENT
Start: 2020-07-22 | End: 2020-07-23

## 2020-07-22 RX ORDER — ONDANSETRON 2 MG/ML
4 INJECTION INTRAMUSCULAR; INTRAVENOUS EVERY 6 HOURS PRN
Status: DISCONTINUED | OUTPATIENT
Start: 2020-07-22 | End: 2020-07-23

## 2020-07-22 RX ORDER — INSULIN ASPART 100 [IU]/ML
INJECTION, SOLUTION INTRAVENOUS; SUBCUTANEOUS
Status: COMPLETED
Start: 2020-07-22 | End: 2020-07-22

## 2020-07-22 RX ORDER — PHENYLEPHRINE HCL 10 MG/ML
VIAL (ML) INJECTION AS NEEDED
Status: DISCONTINUED | OUTPATIENT
Start: 2020-07-22 | End: 2020-07-22 | Stop reason: SURG

## 2020-07-22 RX ORDER — NEOSTIGMINE METHYLSULFATE 1 MG/ML
INJECTION INTRAVENOUS AS NEEDED
Status: DISCONTINUED | OUTPATIENT
Start: 2020-07-22 | End: 2020-07-22 | Stop reason: SURG

## 2020-07-22 RX ORDER — CEFAZOLIN SODIUM/WATER 2 G/20 ML
2 SYRINGE (ML) INTRAVENOUS EVERY 8 HOURS
Status: COMPLETED | OUTPATIENT
Start: 2020-07-22 | End: 2020-07-23

## 2020-07-22 RX ORDER — LABETALOL HYDROCHLORIDE 5 MG/ML
5 INJECTION, SOLUTION INTRAVENOUS EVERY 5 MIN PRN
Status: DISCONTINUED | OUTPATIENT
Start: 2020-07-22 | End: 2020-07-22 | Stop reason: HOSPADM

## 2020-07-22 RX ORDER — ONDANSETRON 2 MG/ML
4 INJECTION INTRAMUSCULAR; INTRAVENOUS EVERY 6 HOURS PRN
Status: DISCONTINUED | OUTPATIENT
Start: 2020-07-22 | End: 2020-07-22

## 2020-07-22 RX ORDER — HYDROCODONE BITARTRATE AND ACETAMINOPHEN 5; 325 MG/1; MG/1
1 TABLET ORAL EVERY 4 HOURS PRN
Status: DISCONTINUED | OUTPATIENT
Start: 2020-07-22 | End: 2020-07-23

## 2020-07-22 RX ORDER — ACETAMINOPHEN 325 MG/1
650 TABLET ORAL EVERY 4 HOURS PRN
Status: DISCONTINUED | OUTPATIENT
Start: 2020-07-22 | End: 2020-07-23

## 2020-07-22 RX ORDER — CEFAZOLIN SODIUM/WATER 2 G/20 ML
2 SYRINGE (ML) INTRAVENOUS ONCE
Status: COMPLETED | OUTPATIENT
Start: 2020-07-22 | End: 2020-07-22

## 2020-07-22 RX ORDER — HYDROCHLOROTHIAZIDE 25 MG/1
25 TABLET ORAL DAILY
Status: DISCONTINUED | OUTPATIENT
Start: 2020-07-22 | End: 2020-07-22

## 2020-07-22 RX ORDER — LOSARTAN POTASSIUM 100 MG/1
100 TABLET ORAL DAILY
Status: DISCONTINUED | OUTPATIENT
Start: 2020-07-22 | End: 2020-07-22

## 2020-07-22 RX ORDER — LOSARTAN POTASSIUM 100 MG/1
100 TABLET ORAL DAILY
COMMUNITY
End: 2020-10-16

## 2020-07-22 RX ORDER — ACETAMINOPHEN 500 MG
1000 TABLET ORAL ONCE AS NEEDED
Status: DISCONTINUED | OUTPATIENT
Start: 2020-07-22 | End: 2020-07-22 | Stop reason: HOSPADM

## 2020-07-22 RX ORDER — METOCLOPRAMIDE HYDROCHLORIDE 5 MG/ML
INJECTION INTRAMUSCULAR; INTRAVENOUS AS NEEDED
Status: DISCONTINUED | OUTPATIENT
Start: 2020-07-22 | End: 2020-07-22 | Stop reason: SURG

## 2020-07-22 RX ORDER — METOCLOPRAMIDE HYDROCHLORIDE 5 MG/ML
5 INJECTION INTRAMUSCULAR; INTRAVENOUS EVERY 8 HOURS PRN
Status: DISCONTINUED | OUTPATIENT
Start: 2020-07-22 | End: 2020-07-23

## 2020-07-22 RX ORDER — LIDOCAINE HYDROCHLORIDE 10 MG/ML
INJECTION, SOLUTION INFILTRATION; PERINEURAL
Status: COMPLETED
Start: 2020-07-22 | End: 2020-07-22

## 2020-07-22 RX ORDER — DEXAMETHASONE SODIUM PHOSPHATE 4 MG/ML
VIAL (ML) INJECTION AS NEEDED
Status: DISCONTINUED | OUTPATIENT
Start: 2020-07-22 | End: 2020-07-22 | Stop reason: SURG

## 2020-07-22 RX ORDER — ONDANSETRON 2 MG/ML
4 INJECTION INTRAMUSCULAR; INTRAVENOUS AS NEEDED
Status: DISCONTINUED | OUTPATIENT
Start: 2020-07-22 | End: 2020-07-22 | Stop reason: HOSPADM

## 2020-07-22 RX ORDER — SODIUM CHLORIDE, SODIUM LACTATE, POTASSIUM CHLORIDE, CALCIUM CHLORIDE 600; 310; 30; 20 MG/100ML; MG/100ML; MG/100ML; MG/100ML
INJECTION, SOLUTION INTRAVENOUS CONTINUOUS
Status: DISCONTINUED | OUTPATIENT
Start: 2020-07-22 | End: 2020-07-23

## 2020-07-22 RX ORDER — MORPHINE SULFATE 4 MG/ML
4 INJECTION, SOLUTION INTRAMUSCULAR; INTRAVENOUS EVERY 2 HOUR PRN
Status: DISCONTINUED | OUTPATIENT
Start: 2020-07-22 | End: 2020-07-23

## 2020-07-22 RX ORDER — HYDROMORPHONE HYDROCHLORIDE 1 MG/ML
INJECTION, SOLUTION INTRAMUSCULAR; INTRAVENOUS; SUBCUTANEOUS
Status: COMPLETED
Start: 2020-07-22 | End: 2020-07-22

## 2020-07-22 RX ORDER — INSULIN ASPART 100 [IU]/ML
INJECTION, SOLUTION INTRAVENOUS; SUBCUTANEOUS ONCE
Status: COMPLETED | OUTPATIENT
Start: 2020-07-22 | End: 2020-07-22

## 2020-07-22 RX ORDER — MEPERIDINE HYDROCHLORIDE 25 MG/ML
12.5 INJECTION INTRAMUSCULAR; INTRAVENOUS; SUBCUTANEOUS AS NEEDED
Status: DISCONTINUED | OUTPATIENT
Start: 2020-07-22 | End: 2020-07-22 | Stop reason: HOSPADM

## 2020-07-22 RX ORDER — AMLODIPINE BESYLATE 10 MG/1
10 TABLET ORAL DAILY
Status: DISCONTINUED | OUTPATIENT
Start: 2020-07-22 | End: 2020-07-22

## 2020-07-22 RX ORDER — LIDOCAINE HYDROCHLORIDE 10 MG/ML
INJECTION, SOLUTION EPIDURAL; INFILTRATION; INTRACAUDAL; PERINEURAL AS NEEDED
Status: DISCONTINUED | OUTPATIENT
Start: 2020-07-22 | End: 2020-07-22 | Stop reason: SURG

## 2020-07-22 RX ORDER — DOCUSATE SODIUM 100 MG/1
100 CAPSULE, LIQUID FILLED ORAL 2 TIMES DAILY
Status: DISCONTINUED | OUTPATIENT
Start: 2020-07-22 | End: 2020-07-23

## 2020-07-22 RX ORDER — DEXTROSE MONOHYDRATE 25 G/50ML
50 INJECTION, SOLUTION INTRAVENOUS
Status: DISCONTINUED | OUTPATIENT
Start: 2020-07-22 | End: 2020-07-23

## 2020-07-22 RX ORDER — MIDAZOLAM HYDROCHLORIDE 1 MG/ML
INJECTION INTRAMUSCULAR; INTRAVENOUS
Status: COMPLETED
Start: 2020-07-22 | End: 2020-07-22

## 2020-07-22 RX ORDER — SODIUM CHLORIDE 450 MG/100ML
INJECTION, SOLUTION INTRAVENOUS CONTINUOUS
Status: DISCONTINUED | OUTPATIENT
Start: 2020-07-22 | End: 2020-07-23

## 2020-07-22 RX ADMIN — LIDOCAINE HYDROCHLORIDE 50 MG: 10 INJECTION, SOLUTION EPIDURAL; INFILTRATION; INTRACAUDAL; PERINEURAL at 13:21:00

## 2020-07-22 RX ADMIN — PHENYLEPHRINE HCL 100 MCG: 10 MG/ML VIAL (ML) INJECTION at 14:20:00

## 2020-07-22 RX ADMIN — PHENYLEPHRINE HCL 100 MCG: 10 MG/ML VIAL (ML) INJECTION at 14:03:00

## 2020-07-22 RX ADMIN — SODIUM CHLORIDE, SODIUM LACTATE, POTASSIUM CHLORIDE, CALCIUM CHLORIDE: 600; 310; 30; 20 INJECTION, SOLUTION INTRAVENOUS at 13:17:00

## 2020-07-22 RX ADMIN — GLYCOPYRROLATE 0.4 MG: 0.2 INJECTION, SOLUTION INTRAMUSCULAR; INTRAVENOUS at 15:31:00

## 2020-07-22 RX ADMIN — CEFAZOLIN SODIUM/WATER 2 G: 2 G/20 ML SYRINGE (ML) INTRAVENOUS at 13:37:00

## 2020-07-22 RX ADMIN — SODIUM CHLORIDE, SODIUM LACTATE, POTASSIUM CHLORIDE, CALCIUM CHLORIDE: 600; 310; 30; 20 INJECTION, SOLUTION INTRAVENOUS at 15:46:00

## 2020-07-22 RX ADMIN — NEOSTIGMINE METHYLSULFATE 3.5 MG: 1 INJECTION INTRAVENOUS at 15:31:00

## 2020-07-22 RX ADMIN — DEXAMETHASONE SODIUM PHOSPHATE 4 MG: 4 MG/ML VIAL (ML) INJECTION at 13:26:00

## 2020-07-22 RX ADMIN — PHENYLEPHRINE HCL 100 MCG: 10 MG/ML VIAL (ML) INJECTION at 14:07:00

## 2020-07-22 RX ADMIN — METOCLOPRAMIDE HYDROCHLORIDE 10 MG: 5 INJECTION INTRAMUSCULAR; INTRAVENOUS at 13:26:00

## 2020-07-22 RX ADMIN — ONDANSETRON 4 MG: 2 INJECTION INTRAMUSCULAR; INTRAVENOUS at 15:31:00

## 2020-07-22 RX ADMIN — ROCURONIUM BROMIDE 50 MG: 10 INJECTION, SOLUTION INTRAVENOUS at 13:22:00

## 2020-07-22 NOTE — ANESTHESIA POSTPROCEDURE EVALUATION
[address] [address] Patient Status:  Outpatient in a Bed   Age/Gender 37year old female MRN [id number]   Location [address] [facility] Attending Antolin Moreira MD   Hosp Day # 0 PCP Jerrod Ojeda MD

## 2020-07-22 NOTE — PROCEDURES
[facility]  Procedure Note    Jai Molina Patient Status:  Outpatient in a Bed    10/15/1980 MRN [id number]   Location [address] [facility] Attending Marisol Seymour MD   Hosp Day # 0 PCP Andrew Robles MD     Proc

## 2020-07-22 NOTE — PROGRESS NOTES
NURSING ADMISSION NOTE      Patient admitted via bed  Oriented to room. Safety precautions initiated. Bed in low position. Call light in reach.     A&O x4, primarily Maltese speaking - understands English fairly well, interpretor Ipad used for [employer]

## 2020-07-22 NOTE — H&P
[address] Patient Status:  Outpatient in a Bed    10/15/1980 MRN [id number]   Location [address] [facility] Attending Amrik Stephenson MD   Hosp Day # 0 PCP Arturo Hernandez MD procedure, including risks, benefits and side effects related to the alternatives, and risks related to not receiving this procedure.       Recommendations: Conversion of existing L PCN to wire/catheter to bladder, preop for PCNL    Rosalie Bansal MD  7/22/2020

## 2020-07-22 NOTE — PROGRESS NOTES
07/22/20 1823   Clinical Encounter Type   Visited With Patient and family together  ( at bedside Wolof speaking pt.    translated)   Routine Visit Introduction   Continue Visiting No   Patient's Supportive Strategies/Resources  pr

## 2020-07-22 NOTE — INTERVAL H&P NOTE
Pre-op Diagnosis: Kidney stone on left side [N20.0]    The above referenced H&P was reviewed by Alexander Horton MD on 7/22/2020, the patient was examined and no significant changes have occurred in the patient's condition since the H&P was performed.   I

## 2020-07-22 NOTE — CONSULTS
NEYDA HOSPITALIST  CONSULT     [address] [facility] Patient Status:  Outpatient in a Bed    10/15/1980 MRN [id number]   [facility] 3NW-A Attending Marisol Seymour MD   Hosp Day # 0 PCP Andrew Robles MD     Reason for consult: Veronica Cabezas BMI 27.66 kg/m²   General: No acute distress. Alert and oriented x 3. HEENT: Normocephalic atraumatic. Moist mucous membranes. EOM-I. PERRLA. Anicteric. Neck: No lymphadenopathy. No JVD. No carotid bruits. Respiratory: Clear to auscultation bilaterally.

## 2020-07-22 NOTE — ANESTHESIA PROCEDURE NOTES
Airway  Urgency: elective      General Information and Staff    Patient location during procedure: OR  Anesthesiologist: Pily Escamilla MD  Resident/CRNA: Nikki Nicholas CRNA  Performed: CRNA     Indications and Patient Condition  Indications for a

## 2020-07-22 NOTE — ANESTHESIA PREPROCEDURE EVALUATION
PRE-OP EVALUATION    Patient Name: Eliotherman Roles    Pre-op Diagnosis: Kidney stone on left side [N20.0]    Procedure(s):  Percutaneous left nephrolithotripsy    Surgeon(s) and Role:     * Amador Deutsch MD - Primary     * Christo Tran, Disp: , Rfl:   ergocalciferol 1.25 MG (43056 UT) Oral Cap, Take 50,000 Units by mouth every 7 days. , Disp: , Rfl:   hydrochlorothiazide 25 MG Oral Tab, Take 25 mg by mouth daily. , Disp: , Rfl:         Allergies: Patient has no known allergies.       Anesthe attack, stroke, and death. All questions answered, patient in agreement with plan.                     Present on Admission:  **None**

## 2020-07-22 NOTE — TELEPHONE ENCOUNTER
Per Dr Hamlet Maldonado, patient needs note for work that she will be off for at least 30 days. Her  will pick it up tomorrow at the office.

## 2020-07-23 ENCOUNTER — APPOINTMENT (OUTPATIENT)
Dept: GENERAL RADIOLOGY | Facility: HOSPITAL | Age: 40
End: 2020-07-23
Attending: UROLOGY
Payer: COMMERCIAL

## 2020-07-23 ENCOUNTER — TELEPHONE (OUTPATIENT)
Dept: SURGERY | Facility: CLINIC | Age: 40
End: 2020-07-23

## 2020-07-23 VITALS
BODY MASS INDEX: 27.71 KG/M2 | SYSTOLIC BLOOD PRESSURE: 158 MMHG | WEIGHT: 176.56 LBS | TEMPERATURE: 98 F | OXYGEN SATURATION: 95 % | HEART RATE: 75 BPM | RESPIRATION RATE: 18 BRPM | HEIGHT: 67 IN | DIASTOLIC BLOOD PRESSURE: 91 MMHG

## 2020-07-23 LAB
GLUCOSE BLD-MCNC: 152 MG/DL (ref 70–99)
GLUCOSE BLD-MCNC: 155 MG/DL (ref 70–99)

## 2020-07-23 PROCEDURE — 74018 RADEX ABDOMEN 1 VIEW: CPT | Performed by: UROLOGY

## 2020-07-23 PROCEDURE — 99225 SUBSEQUENT OBSERVATION CARE: CPT | Performed by: HOSPITALIST

## 2020-07-23 RX ORDER — HYDROCODONE BITARTRATE AND ACETAMINOPHEN 5; 325 MG/1; MG/1
1-2 TABLET ORAL EVERY 4 HOURS PRN
Qty: 20 TABLET | Refills: 0 | Status: SHIPPED | OUTPATIENT
Start: 2020-07-23 | End: 2020-10-16 | Stop reason: ALTCHOICE

## 2020-07-23 NOTE — DISCHARGE SUMMARY
Patient has complicated history of having a left pyelonephritis and hydronephrosis identified in January, 2020 at [facility].  At that time a T scan showed a large incomplete left staghorn calculus which was branched.   Immediate nuclear renal scan s

## 2020-07-23 NOTE — PROGRESS NOTES
[facility] HOSPITALIST  Progress Note     Gordan Ports Jesus Verner Ley Patient Status:  Outpatient in a Bed    10/15/1980 MRN [id number]   [facility] 3NW-A Attending Tete Woodward MD   Hosp Day # 0 PCP Brenton Limon MD     Chief Complaint: f insertion  2. Per Urology   2. Hypertension  1. Can resume meds for DC   3. DM type 2 on insulin    Ok for DC   CT imaging per Urology.  Can f/u results     Vicky Mullen MD

## 2020-07-23 NOTE — PROGRESS NOTES
[facility]     Progress Note    [address]  [phone]    [address] Patient Status:  Outpatient in a Bed    10/15/1980 MRN [id number]   [facility] 3NW-A Attending Dian Choe MD   Hosp Day # 0 PCP

## 2020-07-23 NOTE — TELEPHONE ENCOUNTER
Per pt there are not CT appointments until 7/29, states she was told by scheduling dept that order needs to changed to stat order for the pt to be seen sooner. Please advise thank you.

## 2020-07-23 NOTE — TELEPHONE ENCOUNTER
Needs follow-up with me on Tuesday or Thursday next week [place].   Scheduled to have a noncontrast CT scan on Monday

## 2020-07-23 NOTE — PLAN OF CARE
NURSING DISCHARGE NOTE    Discharged Home via Wheelchair. Accompanied by Support staff AND SPOUSE  Belongings Returned to patient from safe. 1600 PT D/C VIA WHEELCHAIR IN STABLE CONDITION.  REVIEWED D/C INSTRUCTIONS WITH PT AND HER SPOUSE. REMOVED

## 2020-07-23 NOTE — PLAN OF CARE
Pt is A&O, VSS, with c/o of moderate pain, pain medication given. Pt is Armenian speaking. Pt is on RA with bilateral clear diminished lung sounds. IS at bedside encouraged. Abdomen is soft and nonntender with active bowel sounds.  Pt states she is belching,

## 2020-07-24 NOTE — TELEPHONE ENCOUNTER
RN called patient in response to MD's request to call this patient. RN spoke to patient and she confirmed that she is having her CT abd on 7/27 at 7am and her appt with Dr Hamlet Maldonado is on Tuesday, 7/28 5:15pm. Patient agreeable to plans.  No questions at this

## 2020-07-26 LAB — CALCULI MASS: 957 MG

## 2020-07-27 ENCOUNTER — HOSPITAL ENCOUNTER (OUTPATIENT)
Dept: CT IMAGING | Facility: HOSPITAL | Age: 40
Discharge: HOME | End: 2020-07-27
Attending: UROLOGY
Payer: COMMERCIAL

## 2020-07-27 DIAGNOSIS — N20.0 KIDNEY STONE ON LEFT SIDE: ICD-10-CM

## 2020-07-27 PROCEDURE — 74176 CT ABD & PELVIS W/O CONTRAST: CPT | Performed by: UROLOGY

## 2020-07-28 ENCOUNTER — OFFICE VISIT (OUTPATIENT)
Dept: SURGERY | Facility: CLINIC | Age: 40
End: 2020-07-28
Payer: COMMERCIAL

## 2020-07-28 VITALS — HEART RATE: 72 BPM | SYSTOLIC BLOOD PRESSURE: 165 MMHG | DIASTOLIC BLOOD PRESSURE: 98 MMHG

## 2020-07-28 DIAGNOSIS — N20.0 RENAL CALCULUS, LEFT: Primary | ICD-10-CM

## 2020-07-28 PROCEDURE — 1111F DSCHRG MED/CURRENT MED MERGE: CPT | Performed by: UROLOGY

## 2020-07-28 PROCEDURE — 3080F DIAST BP >= 90 MM HG: CPT | Performed by: UROLOGY

## 2020-07-28 PROCEDURE — 99024 POSTOP FOLLOW-UP VISIT: CPT | Performed by: UROLOGY

## 2020-07-28 PROCEDURE — 3077F SYST BP >= 140 MM HG: CPT | Performed by: UROLOGY

## 2020-07-28 NOTE — PROGRESS NOTES
Rooming Clinician:     Anabel Coker is a 37year old female.   Patient presents with:  Consult: Patient presents today for post op surgery, patient states Ba Vargas feels right side of  her arm and bel lylike cramping\" started 7/24        HPI Alcohol use: Never      Frequency: Never    Drug use: Never       REVIEW OF SYSTEMS:     GENERAL HEALTH: feels well otherwise  SKIN: denies any unusual skin lesions or rashes  RESPIRATORY: denies shortness of breath with exertion  CARDIOVASCULAR: denies ch ESWL's and diabetes and HTN, early studies vs repeat studies were discussed and the consensus of the medical community suggests that ESWL is a safe procedure and can be an effective means of treating urinary calculi in appropriate circumstances.   The patie

## 2020-08-06 ENCOUNTER — TELEPHONE (OUTPATIENT)
Dept: SURGERY | Facility: CLINIC | Age: 40
End: 2020-08-06

## 2020-08-06 DIAGNOSIS — N20.0 KIDNEY STONES: Primary | ICD-10-CM

## 2020-08-06 NOTE — TELEPHONE ENCOUNTER
RN called patient and relayed MD's message and recommendations.  Patient verbalized understanding but asking to call back when  is also around to set up her appt     7/26/2020 12:31 PM      Your recent calcium stone as expected is abnormal.  Recommen

## 2020-08-07 NOTE — TELEPHONE ENCOUNTER
Patients /Mak calling without patient on the line while at work. Patients  asked that patient be contacted directly with  at:[phone],thanks.

## 2020-08-10 ENCOUNTER — TELEPHONE (OUTPATIENT)
Dept: SURGERY | Facility: CLINIC | Age: 40
End: 2020-08-10

## 2020-08-13 ENCOUNTER — HOSPITAL ENCOUNTER (OUTPATIENT)
Dept: CT IMAGING | Facility: HOSPITAL | Age: 40
Discharge: HOME | End: 2020-08-13
Attending: UROLOGY
Payer: COMMERCIAL

## 2020-08-13 DIAGNOSIS — N20.0 KIDNEY STONES: ICD-10-CM

## 2020-08-13 PROCEDURE — 74176 CT ABD & PELVIS W/O CONTRAST: CPT | Performed by: UROLOGY

## 2020-08-16 PROBLEM — E78.2 MIXED HYPERLIPIDEMIA: Status: RESOLVED | Noted: 2020-07-13 | Resolved: 2020-08-16

## 2020-08-17 ENCOUNTER — OFFICE VISIT (OUTPATIENT)
Dept: FAMILY MEDICINE CLINIC | Facility: CLINIC | Age: 40
End: 2020-08-17
Payer: COMMERCIAL

## 2020-08-17 VITALS
BODY MASS INDEX: 29 KG/M2 | TEMPERATURE: 99 F | DIASTOLIC BLOOD PRESSURE: 86 MMHG | SYSTOLIC BLOOD PRESSURE: 140 MMHG | WEIGHT: 182 LBS | RESPIRATION RATE: 18 BRPM | HEART RATE: 74 BPM

## 2020-08-17 DIAGNOSIS — E11.65 CONTROLLED TYPE 2 DIABETES MELLITUS WITH HYPERGLYCEMIA, WITH LONG-TERM CURRENT USE OF INSULIN (HCC): Primary | ICD-10-CM

## 2020-08-17 DIAGNOSIS — Z79.4 CONTROLLED TYPE 2 DIABETES MELLITUS WITH HYPERGLYCEMIA, WITH LONG-TERM CURRENT USE OF INSULIN (HCC): Primary | ICD-10-CM

## 2020-08-17 DIAGNOSIS — N20.0 KIDNEY STONE ON LEFT SIDE: ICD-10-CM

## 2020-08-17 DIAGNOSIS — I10 ESSENTIAL HYPERTENSION: ICD-10-CM

## 2020-08-17 LAB — HEMOGLOBIN A1C: 8.7 % (ref 4.3–5.6)

## 2020-08-17 PROCEDURE — 99214 OFFICE O/P EST MOD 30 MIN: CPT | Performed by: FAMILY MEDICINE

## 2020-08-17 PROCEDURE — 3079F DIAST BP 80-89 MM HG: CPT | Performed by: FAMILY MEDICINE

## 2020-08-17 PROCEDURE — 3008F BODY MASS INDEX DOCD: CPT | Performed by: FAMILY MEDICINE

## 2020-08-17 PROCEDURE — 3077F SYST BP >= 140 MM HG: CPT | Performed by: FAMILY MEDICINE

## 2020-08-17 PROCEDURE — 83036 HEMOGLOBIN GLYCOSYLATED A1C: CPT | Performed by: FAMILY MEDICINE

## 2020-08-17 RX ORDER — HYDROCHLOROTHIAZIDE 25 MG/1
25 TABLET ORAL DAILY
Qty: 90 TABLET | Refills: 3 | Status: SHIPPED | OUTPATIENT
Start: 2020-08-17 | End: 2021-10-04

## 2020-08-17 RX ORDER — AMLODIPINE BESYLATE 10 MG/1
10 TABLET ORAL DAILY
Qty: 90 TABLET | Refills: 3 | Status: SHIPPED | OUTPATIENT
Start: 2020-08-17 | End: 2020-10-16

## 2020-08-17 NOTE — PROGRESS NOTES
HPI:   Patient presents with: Follow - Up: 4 week follow up, abdominal discomfort sugar levels have gone up     Nash Ferdinand is a 37year old female who presents for recheck of her diabetes.   Subjective    Doing better after lithotripsy, b well-developed and well-nourished. No distress. HENT:   Head: Normocephalic. Neck: Normal range of motion. Cardiovascular: Normal rate, regular rhythm, S1 normal, S2 normal and normal heart sounds. No murmur heard.   Pulses:       Dorsalis pedis pu current use of insulin (HCC) - Primary    Overview     Levemir 30 BID and novolog 7 TID, A1c 8.6% 5/2020 at [facility]         Current Assessment & Plan     Increase to 30 BID< Diabetes Mellitus ed center.  Reassess in 2 months         Relevant Medications    insu

## 2020-08-18 NOTE — ASSESSMENT & PLAN NOTE
Stable, Continue present management.     Blood Pressure and Cardiac Medications          amLODIPine Besylate 10 MG Oral Tab    losartan 100 MG Oral Tab

## 2020-08-20 ENCOUNTER — OFFICE VISIT (OUTPATIENT)
Dept: SURGERY | Facility: CLINIC | Age: 40
End: 2020-08-20
Payer: COMMERCIAL

## 2020-08-20 VITALS — DIASTOLIC BLOOD PRESSURE: 86 MMHG | HEART RATE: 89 BPM | SYSTOLIC BLOOD PRESSURE: 150 MMHG

## 2020-08-20 DIAGNOSIS — N20.0 RENAL CALCULUS, LEFT: ICD-10-CM

## 2020-08-20 DIAGNOSIS — R82.90 URINE FINDING: Primary | ICD-10-CM

## 2020-08-20 LAB
GLUCOSE (URINE DIPSTICK): 500 MG/DL
MULTISTIX LOT#: 1044 NUMERIC
NITRITE, URINE: 100
PH, URINE: 7 (ref 4.5–8)
PROTEIN (URINE DIPSTICK): 100 MG/DL
SPECIFIC GRAVITY: 1.02 (ref 1–1.03)
URINE-COLOR: YELLOW

## 2020-08-20 PROCEDURE — 99024 POSTOP FOLLOW-UP VISIT: CPT | Performed by: UROLOGY

## 2020-08-20 PROCEDURE — 81003 URINALYSIS AUTO W/O SCOPE: CPT | Performed by: UROLOGY

## 2020-08-20 PROCEDURE — 3079F DIAST BP 80-89 MM HG: CPT | Performed by: UROLOGY

## 2020-08-20 PROCEDURE — 3077F SYST BP >= 140 MM HG: CPT | Performed by: UROLOGY

## 2020-08-20 NOTE — PROGRESS NOTES
Rooming Clinician:     Otis Thrasher is a 37year old female. Patient presents with: Follow - Up: Patient presents for ct results         HPI:     Patient returns with her partner to discuss most recent CT scan.   She did have a left PCNL unusual skin lesions or rashes  RESPIRATORY: denies shortness of breath with exertion  CARDIOVASCULAR: denies chest pain on exertion  GI: denies abdominal pain and denies heartburn  : see HPI  NEURO: no sensory or motor complaint    EXAM:     /86 ( 7/23/2020 at 1:36 PM     Finalized by (CST): Sylvie Kussmaul, MD on 7/23/2020 at 1:40 PM       Ct Abdomen+pelvis Kidneystone 2d Rndr(no Iv,no Oral)(cpt=74176)    Result Date: 8/14/2020  PROCEDURE:  CT ABDOMEN+PELVIS Alejandro Lin 2D RNDR(NO IV,NO ORAL)(CPT= renal simple cyst measuring 1.7 cm. No calcifications are noted in the mid to distal ureter. No bladder calcifications noted. ADRENALS:  No mass or enlargement. LIVER:  No enlargement, atrophy, abnormal density, or significant focal lesion.   BILIARY: stones in the cranio-caudal plane. Dose reduction techniques were used. Dose information is transmitted to the [facility] [employer] of Radiology) NRDR ([address]) which includes the Dose Index Registry.   PATIENT STATED HISTORY: (As Nicolasa Whitfield MD on 7/27/2020 at 8:14 AM     Finalized by (CST): Micheal Flores MD on 7/27/2020 at 8:21 AM       Xr Fluoroscopy C-arm Time <1 Hour  (cpt=76000)    Result Date: 7/22/2020  PROCEDURE:  XR FLUOROSCOPY C-ARM TIME <1 HOUR  (CPT=76000)  INDICATIONS:  Clide Rand able to be navigated down the ureter into the urinary bladder. Injection of contrast into the bladder confirms intraluminal positioning. An Amplatz wire was then able to be placed into the urinary bladder through the 5 Western Aleksandra Kumpe catheter.   The cathete finding  -     URINALYSIS, AUTO, W/O SCOPE            The patient indicates understanding of these issues and agrees to the plan. Loretta Chan M.D.   Urology

## 2020-08-21 ENCOUNTER — TELEPHONE (OUTPATIENT)
Dept: FAMILY MEDICINE CLINIC | Facility: CLINIC | Age: 40
End: 2020-08-21

## 2020-08-21 NOTE — H&P
Patient is admitted to the hospital for ureteroscopic intracorporeal laser lithotripsy of residual fragments the nearly complete left staghorn calculus.   She did have a left PCNL for a nearly complete staghorn calculus of the left kidney removing approx rashes  RESPIRATORY: denies shortness of breath with exertion  CARDIOVASCULAR: denies chest pain on exertion  GI: denies abdominal pain and denies heartburn  : see HPI  NEURO: no sensory or motor complaint     EXAM:      /86 (BP Location: Left arm, Jayne Laughlin MD on 7/23/2020 at 1:36 PM     Finalized by (CST): Cookie Foreman MD on 7/23/2020 at 1:40 PM        Ct Abdomen+pelvis Kidneystone 2d Rndr(no Iv,no Oral)(cpt=74176)     Result Date: 8/14/2020  PROCEDURE:  CT ABDOMEN+PELVIS Caitie Pill 2D RNDR(NO I pole left renal simple cyst measuring 1.7 cm. No calcifications are noted in the mid to distal ureter. No bladder calcifications noted. ADRENALS:  No mass or enlargement. LIVER:  No enlargement, atrophy, abnormal density, or significant focal lesion. localize potential stones in the cranio-caudal plane. Dose reduction techniques were used. Dose information is transmitted to the [facility] [employer] [facility] Radiology) NRDR ([address]) which includes the Dose Index Registry.   PATIENT by (CST): Kavita Solorzano MD on 7/27/2020 at 8:14 AM     Finalized by (CST): Kavita Solorzano MD on 7/27/2020 at 8:21 AM        Xr Fluoroscopy C-arm Time <1 Hour  (cpt=76000)     Result Date: 7/22/2020  PROCEDURE:  XR FLUOROSCOPY C-ARM TIME <1 HOUR  (CPT=76000)  JOSEPHINE Kumpe catheter were able to be navigated down the ureter into the urinary bladder. Injection of contrast into the bladder confirms intraluminal positioning.   An Amplatz wire was then able to be placed into the urinary bladder through the 5 Western Aleksandra Kumpe ca patient and explained the procedure. I told the patient would be done under general anesthetic. A small ureteroscope would be threaded up the ureter to the level of the stone.   The stone will be visualized, broken into pieces with a mechanical device, la

## 2020-08-21 NOTE — TELEPHONE ENCOUNTER
Patient called states has a procedure coming up on 08/26/20 and wants to know how much insulin should take prior and what medication should she stop taking?      [place] Speaking only

## 2020-08-21 NOTE — TELEPHONE ENCOUNTER
On Levemir 30 BID, ok for 20 units night before and 15 units day of procedure.  Thanks and stay well, Florestine Runner, MD

## 2020-08-23 ENCOUNTER — APPOINTMENT (OUTPATIENT)
Dept: LAB | Facility: HOSPITAL | Age: 40
End: 2020-08-23
Attending: UROLOGY
Payer: OTHER GOVERNMENT

## 2020-08-23 DIAGNOSIS — N20.0 KIDNEY STONE ON LEFT SIDE: ICD-10-CM

## 2020-08-24 ENCOUNTER — TELEPHONE (OUTPATIENT)
Dept: SURGERY | Facility: CLINIC | Age: 40
End: 2020-08-24

## 2020-08-24 ENCOUNTER — TELEPHONE (OUTPATIENT)
Dept: FAMILY MEDICINE CLINIC | Facility: CLINIC | Age: 40
End: 2020-08-24

## 2020-08-24 DIAGNOSIS — N20.0 KIDNEY STONE: Primary | ICD-10-CM

## 2020-08-24 LAB — SARS-COV-2 RNA RESP QL NAA+PROBE: NOT DETECTED

## 2020-08-24 RX ORDER — CEPHALEXIN 500 MG/1
500 CAPSULE ORAL 3 TIMES DAILY
Qty: 30 CAPSULE | Refills: 0 | Status: SHIPPED | OUTPATIENT
Start: 2020-08-24 | End: 2020-09-03

## 2020-08-24 RX ORDER — CIPROFLOXACIN 500 MG/1
500 TABLET, FILM COATED ORAL 2 TIMES DAILY
Qty: 20 TABLET | Refills: 0 | Status: SHIPPED | OUTPATIENT
Start: 2020-08-24 | End: 2020-09-03

## 2020-08-24 NOTE — TELEPHONE ENCOUNTER
Patient called to clarify instructions. Pt went over her insulin however, she would like to know about her other medications. Patient would like to know if she needs to follow same medication instructions from 7/13     Patient only speaks [address] .  Surge

## 2020-08-24 NOTE — TELEPHONE ENCOUNTER
Called and talked to patient went over that she should hold the meds other than the insulin for that day and restart them the next day.  She said she understood these instructions

## 2020-08-24 NOTE — TELEPHONE ENCOUNTER
Spoke with Patient in regards to urine culture results, adv per PCP she needs to start two antibiotics that were sent to her pharmacy. Patient understood and verbally agreed.

## 2020-08-24 NOTE — TELEPHONE ENCOUNTER
Cipro and Keflex have been sent to the patient's pharmacy.    ----- Message from Brooklyn Ramirez MD sent at 8/24/2020  7:00 AM CDT -----  Your recent Urine culture shows to bacteria. Need to start on Cipro flaxen and Keflex immediately.  Ciprofloxacin 500

## 2020-08-24 NOTE — TELEPHONE ENCOUNTER
Spoke with Qiana Cisse in IR and order put in for Nephrostomy tube change prior to surgery on 8/26/20.

## 2020-08-26 ENCOUNTER — ANESTHESIA (OUTPATIENT)
Dept: SURGERY | Facility: HOSPITAL | Age: 40
End: 2020-08-26
Payer: COMMERCIAL

## 2020-08-26 ENCOUNTER — HOSPITAL ENCOUNTER (OUTPATIENT)
Dept: INTERVENTIONAL RADIOLOGY/VASCULAR | Facility: HOSPITAL | Age: 40
Discharge: HOME | End: 2020-08-26
Attending: UROLOGY
Payer: COMMERCIAL

## 2020-08-26 ENCOUNTER — ANESTHESIA EVENT (OUTPATIENT)
Dept: SURGERY | Facility: HOSPITAL | Age: 40
End: 2020-08-26
Payer: COMMERCIAL

## 2020-08-26 ENCOUNTER — HOSPITAL ENCOUNTER (OUTPATIENT)
Facility: HOSPITAL | Age: 40
Setting detail: HOSPITAL OUTPATIENT SURGERY
Discharge: HOME | End: 2020-08-26
Attending: UROLOGY | Admitting: UROLOGY
Payer: COMMERCIAL

## 2020-08-26 ENCOUNTER — TELEPHONE (OUTPATIENT)
Dept: SURGERY | Facility: CLINIC | Age: 40
End: 2020-08-26

## 2020-08-26 ENCOUNTER — APPOINTMENT (OUTPATIENT)
Dept: GENERAL RADIOLOGY | Facility: HOSPITAL | Age: 40
End: 2020-08-26
Attending: UROLOGY
Payer: COMMERCIAL

## 2020-08-26 VITALS
OXYGEN SATURATION: 97 % | HEART RATE: 88 BPM | DIASTOLIC BLOOD PRESSURE: 85 MMHG | RESPIRATION RATE: 16 BRPM | HEIGHT: 67 IN | WEIGHT: 178.56 LBS | BODY MASS INDEX: 28.02 KG/M2 | SYSTOLIC BLOOD PRESSURE: 154 MMHG | TEMPERATURE: 98 F

## 2020-08-26 DIAGNOSIS — N20.0 RENAL CALCULUS, LEFT: ICD-10-CM

## 2020-08-26 DIAGNOSIS — N20.0 KIDNEY STONE ON LEFT SIDE: Primary | ICD-10-CM

## 2020-08-26 DIAGNOSIS — N20.0 KIDNEY STONE: ICD-10-CM

## 2020-08-26 LAB
GLUCOSE BLD-MCNC: 185 MG/DL (ref 70–99)
GLUCOSE BLD-MCNC: 193 MG/DL (ref 70–99)
INR BLD: 0.98 (ref 0.89–1.11)
POCT LOT NUMBER: NORMAL
POCT URINE PREGNANCY: NEGATIVE
PSA SERPL DL<=0.01 NG/ML-MCNC: 13.3 SECONDS (ref 12.4–14.6)

## 2020-08-26 PROCEDURE — 0TC48ZZ EXTIRPATION OF MATTER FROM LEFT KIDNEY PELVIS, VIA NATURAL OR ARTIFICIAL OPENING ENDOSCOPIC: ICD-10-PCS | Performed by: UROLOGY

## 2020-08-26 PROCEDURE — 0T25X0Z CHANGE DRAINAGE DEVICE IN KIDNEY, EXTERNAL APPROACH: ICD-10-PCS | Performed by: UROLOGY

## 2020-08-26 PROCEDURE — 52356 CYSTO/URETERO W/LITHOTRIPSY: CPT | Performed by: UROLOGY

## 2020-08-26 PROCEDURE — 0T778DZ DILATION OF LEFT URETER WITH INTRALUMINAL DEVICE, VIA NATURAL OR ARTIFICIAL OPENING ENDOSCOPIC: ICD-10-PCS | Performed by: UROLOGY

## 2020-08-26 PROCEDURE — 50387 CHANGE NEPHROURETERAL CATH: CPT

## 2020-08-26 DEVICE — URETERAL STENT
Type: IMPLANTABLE DEVICE | Site: URETER | Status: FUNCTIONAL
Brand: ASCERTA™

## 2020-08-26 RX ORDER — DEXTROSE MONOHYDRATE 25 G/50ML
50 INJECTION, SOLUTION INTRAVENOUS
Status: DISCONTINUED | OUTPATIENT
Start: 2020-08-26 | End: 2020-08-26 | Stop reason: HOSPADM

## 2020-08-26 RX ORDER — ACETAMINOPHEN 500 MG
1000 TABLET ORAL ONCE
COMMUNITY
End: 2020-10-16

## 2020-08-26 RX ORDER — HYDROCODONE BITARTRATE AND ACETAMINOPHEN 5; 325 MG/1; MG/1
1 TABLET ORAL AS NEEDED
Status: DISCONTINUED | OUTPATIENT
Start: 2020-08-26 | End: 2020-08-26

## 2020-08-26 RX ORDER — DEXAMETHASONE SODIUM PHOSPHATE 4 MG/ML
VIAL (ML) INJECTION AS NEEDED
Status: DISCONTINUED | OUTPATIENT
Start: 2020-08-26 | End: 2020-08-26 | Stop reason: SURG

## 2020-08-26 RX ORDER — SODIUM CHLORIDE, SODIUM LACTATE, POTASSIUM CHLORIDE, CALCIUM CHLORIDE 600; 310; 30; 20 MG/100ML; MG/100ML; MG/100ML; MG/100ML
INJECTION, SOLUTION INTRAVENOUS CONTINUOUS
Status: DISCONTINUED | OUTPATIENT
Start: 2020-08-26 | End: 2020-08-26

## 2020-08-26 RX ORDER — LIDOCAINE HYDROCHLORIDE 10 MG/ML
INJECTION, SOLUTION INFILTRATION; PERINEURAL
Status: DISCONTINUED
Start: 2020-08-26 | End: 2020-08-26 | Stop reason: WASHOUT

## 2020-08-26 RX ORDER — HYDROCODONE BITARTRATE AND ACETAMINOPHEN 5; 325 MG/1; MG/1
2 TABLET ORAL AS NEEDED
Status: DISCONTINUED | OUTPATIENT
Start: 2020-08-26 | End: 2020-08-26

## 2020-08-26 RX ORDER — DEXTROSE MONOHYDRATE 25 G/50ML
50 INJECTION, SOLUTION INTRAVENOUS
Status: DISCONTINUED | OUTPATIENT
Start: 2020-08-26 | End: 2020-08-26

## 2020-08-26 RX ORDER — CEFAZOLIN SODIUM/WATER 2 G/20 ML
SYRINGE (ML) INTRAVENOUS
Status: DISCONTINUED
Start: 2020-08-26 | End: 2020-08-26 | Stop reason: WASHOUT

## 2020-08-26 RX ORDER — INSULIN ASPART 100 [IU]/ML
INJECTION, SOLUTION INTRAVENOUS; SUBCUTANEOUS
Status: DISCONTINUED
Start: 2020-08-26 | End: 2020-08-26 | Stop reason: WASHOUT

## 2020-08-26 RX ORDER — KETOROLAC TROMETHAMINE 30 MG/ML
INJECTION, SOLUTION INTRAMUSCULAR; INTRAVENOUS AS NEEDED
Status: DISCONTINUED | OUTPATIENT
Start: 2020-08-26 | End: 2020-08-26 | Stop reason: SURG

## 2020-08-26 RX ORDER — ONDANSETRON 2 MG/ML
4 INJECTION INTRAMUSCULAR; INTRAVENOUS AS NEEDED
Status: DISCONTINUED | OUTPATIENT
Start: 2020-08-26 | End: 2020-08-26

## 2020-08-26 RX ORDER — CEFAZOLIN SODIUM/WATER 2 G/20 ML
2 SYRINGE (ML) INTRAVENOUS ONCE
Status: COMPLETED | OUTPATIENT
Start: 2020-08-26 | End: 2020-08-26

## 2020-08-26 RX ORDER — LEVOFLOXACIN 5 MG/ML
INJECTION, SOLUTION INTRAVENOUS
Status: COMPLETED
Start: 2020-08-26 | End: 2020-08-26

## 2020-08-26 RX ORDER — HYDROMORPHONE HYDROCHLORIDE 1 MG/ML
0.4 INJECTION, SOLUTION INTRAMUSCULAR; INTRAVENOUS; SUBCUTANEOUS EVERY 5 MIN PRN
Status: DISCONTINUED | OUTPATIENT
Start: 2020-08-26 | End: 2020-08-26

## 2020-08-26 RX ORDER — NALOXONE HYDROCHLORIDE 0.4 MG/ML
80 INJECTION, SOLUTION INTRAMUSCULAR; INTRAVENOUS; SUBCUTANEOUS AS NEEDED
Status: DISCONTINUED | OUTPATIENT
Start: 2020-08-26 | End: 2020-08-26

## 2020-08-26 RX ORDER — METOCLOPRAMIDE HYDROCHLORIDE 5 MG/ML
10 INJECTION INTRAMUSCULAR; INTRAVENOUS AS NEEDED
Status: DISCONTINUED | OUTPATIENT
Start: 2020-08-26 | End: 2020-08-26

## 2020-08-26 RX ORDER — ACETAMINOPHEN 500 MG
1000 TABLET ORAL ONCE
Status: DISCONTINUED | OUTPATIENT
Start: 2020-08-26 | End: 2020-08-26 | Stop reason: HOSPADM

## 2020-08-26 RX ORDER — LIDOCAINE HYDROCHLORIDE 10 MG/ML
INJECTION, SOLUTION EPIDURAL; INFILTRATION; INTRACAUDAL; PERINEURAL AS NEEDED
Status: DISCONTINUED | OUTPATIENT
Start: 2020-08-26 | End: 2020-08-26 | Stop reason: SURG

## 2020-08-26 RX ORDER — PHENYLEPHRINE HCL 10 MG/ML
VIAL (ML) INJECTION AS NEEDED
Status: DISCONTINUED | OUTPATIENT
Start: 2020-08-26 | End: 2020-08-26 | Stop reason: SURG

## 2020-08-26 RX ORDER — MIDAZOLAM HYDROCHLORIDE 1 MG/ML
INJECTION INTRAMUSCULAR; INTRAVENOUS
Status: DISCONTINUED
Start: 2020-08-26 | End: 2020-08-26 | Stop reason: WASHOUT

## 2020-08-26 RX ADMIN — LIDOCAINE HYDROCHLORIDE 25 MG: 10 INJECTION, SOLUTION EPIDURAL; INFILTRATION; INTRACAUDAL; PERINEURAL at 12:01:00

## 2020-08-26 RX ADMIN — KETOROLAC TROMETHAMINE 30 MG: 30 INJECTION, SOLUTION INTRAMUSCULAR; INTRAVENOUS at 13:17:00

## 2020-08-26 RX ADMIN — SODIUM CHLORIDE, SODIUM LACTATE, POTASSIUM CHLORIDE, CALCIUM CHLORIDE: 600; 310; 30; 20 INJECTION, SOLUTION INTRAVENOUS at 12:58:00

## 2020-08-26 RX ADMIN — PHENYLEPHRINE HCL 100 MCG: 10 MG/ML VIAL (ML) INJECTION at 12:53:00

## 2020-08-26 RX ADMIN — SODIUM CHLORIDE, SODIUM LACTATE, POTASSIUM CHLORIDE, CALCIUM CHLORIDE: 600; 310; 30; 20 INJECTION, SOLUTION INTRAVENOUS at 13:28:00

## 2020-08-26 RX ADMIN — SODIUM CHLORIDE, SODIUM LACTATE, POTASSIUM CHLORIDE, CALCIUM CHLORIDE: 600; 310; 30; 20 INJECTION, SOLUTION INTRAVENOUS at 12:17:00

## 2020-08-26 RX ADMIN — PHENYLEPHRINE HCL 100 MCG: 10 MG/ML VIAL (ML) INJECTION at 12:58:00

## 2020-08-26 RX ADMIN — SODIUM CHLORIDE, SODIUM LACTATE, POTASSIUM CHLORIDE, CALCIUM CHLORIDE: 600; 310; 30; 20 INJECTION, SOLUTION INTRAVENOUS at 12:53:00

## 2020-08-26 RX ADMIN — SODIUM CHLORIDE, SODIUM LACTATE, POTASSIUM CHLORIDE, CALCIUM CHLORIDE: 600; 310; 30; 20 INJECTION, SOLUTION INTRAVENOUS at 12:43:00

## 2020-08-26 RX ADMIN — DEXAMETHASONE SODIUM PHOSPHATE 4 MG: 4 MG/ML VIAL (ML) INJECTION at 12:10:00

## 2020-08-26 RX ADMIN — PHENYLEPHRINE HCL 100 MCG: 10 MG/ML VIAL (ML) INJECTION at 12:45:00

## 2020-08-26 RX ADMIN — CEFAZOLIN SODIUM/WATER 2 G: 2 G/20 ML SYRINGE (ML) INTRAVENOUS at 12:12:00

## 2020-08-26 RX ADMIN — PHENYLEPHRINE HCL 200 MCG: 10 MG/ML VIAL (ML) INJECTION at 12:40:00

## 2020-08-26 NOTE — OPERATIVE REPORT
Operative Note    Patient Name: Myrna Devi    Date of Procedure: 8/26/2020    Preoperative Diagnosis: Multiple left renal calculi status post left PCNL for large staghorn calculus    Postoperative Diagnosis: Same    Primary Surgeon: Gladys Myers paused to review the procedure to be performed and concurred noting that the patient has a nephrostomy tube on the left side.   Then the 25 Turkish cystoscope was inserted into the bladder and the proximal portion of the stiff Glidewire was grasped with forc

## 2020-08-26 NOTE — ANESTHESIA PREPROCEDURE EVALUATION
PRE-OP EVALUATION    Patient Name: Anabel Coker    Pre-op Diagnosis: Kidney stone [N20.0]    Procedure(s):  Cystoscopy, left ureteroscopy, holmium high energy laser lithotripsy and possible ureteral stent insertion    Surgeon(s) and Role: Past Surgical History:   Procedure Laterality Date   • D & C  2017    after miscarriage   • NEPHROLITHOTOMY PERCUTANEOUS Left 7/22/2020    Performed by Antolin Moreira MD at [facility] MAIN OR     Social History    Tobacco Use      Smoking status: N

## 2020-08-26 NOTE — TELEPHONE ENCOUNTER
Per pt's spouse, pt is needing a note stating when she can go back to work. States can  in the office.  Please advise

## 2020-08-26 NOTE — PROCEDURES
[facility]  Procedure Note    Effinghamgoldie Blanc Fanny Steven Patient Status:  Outpatient in a Bed    10/15/1980 MRN [id number]   Location [address] [facility] Attending Rajan Austin MD   Hosp Day # 0 PCP Carolina Ford MD     Proc

## 2020-08-26 NOTE — ANESTHESIA PROCEDURE NOTES
Airway  Date/Time: 8/26/2020 12:06 PM  Urgency: elective    Airway not difficult    General Information and Staff    Patient location during procedure: OR  Anesthesiologist: Richardson Ramirez MD  Performed: anesthesiologist     Indications and Patient Cond

## 2020-08-26 NOTE — ANESTHESIA POSTPROCEDURE EVALUATION
[id number] [facility] [address] Patient Status:  Hospital Outpatient Surgery   Age/Gender 37year old female MRN [id number]   [facility] SURGERY Attending Gil Walsh MD   Hosp Day # 0 PCP MD Natanael Limon

## 2020-08-26 NOTE — INTERVAL H&P NOTE
Pre-op Diagnosis: Kidney stone [N20.0]    The above referenced H&P was reviewed by Rose Velázquez MD on 8/26/2020, the patient was examined and no significant changes have occurred in the patient's condition since the H&P was performed.   I discussed wit

## 2020-08-26 NOTE — TELEPHONE ENCOUNTER
Schedule follow-up in the office in about 3 weeks. Possible cystoscopy and stent removal at that time.

## 2020-09-01 ENCOUNTER — TELEPHONE (OUTPATIENT)
Dept: SURGERY | Facility: CLINIC | Age: 40
End: 2020-09-01

## 2020-09-01 NOTE — TELEPHONE ENCOUNTER
This encounter is now closed. Patient is scheduled on 9/10 Thursday 230pm with Dr Pastor Keane. Patient made appt via [employer] as follow up.  RN changed visit to PROCEDURE for possible cysto and stent removal.

## 2020-09-02 NOTE — TELEPHONE ENCOUNTER
Spoke to patient using the language line Ju Avelarkole [id number]). Patient has appt to have her stent removed on 9/10/20 and would like to return to work on 9/11/20 after she has the stent removed. Note faxed to employer with new date. Fax [phone].   LISA colorado

## 2020-09-02 NOTE — TELEPHONE ENCOUNTER
LISA using the language line Pam Roberson OL#[id number]) letting the patient know that she can return to work on Monday 9/7/20. The note has been faxed to her employer.

## 2020-09-02 NOTE — TELEPHONE ENCOUNTER
Per pt's spouse calling for update on note to return to work.  Please fax to [phone]  ATTN: [email or web address]  Please advise

## 2020-09-02 NOTE — TELEPHONE ENCOUNTER
Return to work note faxed to number provided. Message left for Graciela Campos on identified VM that it has been faxed.

## 2020-09-10 ENCOUNTER — PROCEDURE (OUTPATIENT)
Dept: SURGERY | Facility: CLINIC | Age: 40
End: 2020-09-10
Payer: COMMERCIAL

## 2020-09-10 VITALS — SYSTOLIC BLOOD PRESSURE: 151 MMHG | DIASTOLIC BLOOD PRESSURE: 90 MMHG | HEART RATE: 84 BPM

## 2020-09-10 DIAGNOSIS — R82.90 URINE FINDING: Primary | ICD-10-CM

## 2020-09-10 DIAGNOSIS — N20.0 RENAL CALCULUS, LEFT: ICD-10-CM

## 2020-09-10 DIAGNOSIS — N30.00 ACUTE CYSTITIS WITHOUT HEMATURIA: ICD-10-CM

## 2020-09-10 LAB
GLUCOSE (URINE DIPSTICK): 500 MG/DL
MULTISTIX LOT#: 1044 NUMERIC
PH, URINE: 7 (ref 4.5–8)
PROTEIN (URINE DIPSTICK): 300 MG/DL
SPECIFIC GRAVITY: 1.02 (ref 1–1.03)
URINE-COLOR: YELLOW
UROBILINOGEN,SEMI-QN: 0.2 MG/DL (ref 0–1.9)

## 2020-09-10 PROCEDURE — 81003 URINALYSIS AUTO W/O SCOPE: CPT | Performed by: UROLOGY

## 2020-09-10 PROCEDURE — 3080F DIAST BP >= 90 MM HG: CPT | Performed by: UROLOGY

## 2020-09-10 PROCEDURE — 3077F SYST BP >= 140 MM HG: CPT | Performed by: UROLOGY

## 2020-09-10 PROCEDURE — 99024 POSTOP FOLLOW-UP VISIT: CPT | Performed by: UROLOGY

## 2020-09-10 NOTE — PROGRESS NOTES
Rooming Clinician:     Bud Campos is a 37year old female. No chief complaint on file. HPI:     Patient has passed some fragments which were small according to her friend. She is not having pain.   Urine is nitrite positive toda Smokeless tobacco: Never Used    Alcohol use: Never      Frequency: Never    Drug use: Never       REVIEW OF SYSTEMS:     GENERAL HEALTH: feels well otherwise  SKIN: denies any unusual skin lesions or rashes  RESPIRATORY: denies shortness of breath with e ureteral stent. Lucia-stent calculi. ASSESSMENT:     Left renal staghorn calculus status post PCNL and intracorporeal lithotripsy  Urinary tract infection    PLAN:     Urine for culture and sensitivity and microscopic  Bactrim double strength 1 p.o.

## 2020-09-11 RX ORDER — SULFAMETHOXAZOLE AND TRIMETHOPRIM 800; 160 MG/1; MG/1
1 TABLET ORAL 2 TIMES DAILY
Qty: 20 TABLET | Refills: 0 | Status: SHIPPED | OUTPATIENT
Start: 2020-09-11 | End: 2020-09-21

## 2020-09-14 ENCOUNTER — TELEPHONE (OUTPATIENT)
Dept: SURGERY | Facility: CLINIC | Age: 40
End: 2020-09-14

## 2020-09-14 NOTE — TELEPHONE ENCOUNTER
RN reached out to patient to ensure she picks up the antibiotic that Dr Katelyn Jacobs sent to pharmacy. Per patient (in [address]), she picked it up Friday, 9/11.  RN explained that MD discontinue Bactrim and changed it to Cephalexin 500mg TID daily (refill

## 2020-09-16 ENCOUNTER — NURSE ONLY (OUTPATIENT)
Dept: ENDOCRINOLOGY CLINIC | Facility: CLINIC | Age: 40
End: 2020-09-16
Payer: COMMERCIAL

## 2020-09-16 VITALS
DIASTOLIC BLOOD PRESSURE: 70 MMHG | BODY MASS INDEX: 28.41 KG/M2 | SYSTOLIC BLOOD PRESSURE: 136 MMHG | HEART RATE: 106 BPM | RESPIRATION RATE: 18 BRPM | HEIGHT: 67 IN | WEIGHT: 181 LBS

## 2020-09-21 ENCOUNTER — PROCEDURE (OUTPATIENT)
Dept: SURGERY | Facility: CLINIC | Age: 40
End: 2020-09-21
Payer: COMMERCIAL

## 2020-09-21 VITALS — HEART RATE: 70 BPM | DIASTOLIC BLOOD PRESSURE: 76 MMHG | SYSTOLIC BLOOD PRESSURE: 151 MMHG

## 2020-09-21 DIAGNOSIS — N20.0 RENAL CALCULUS, LEFT: ICD-10-CM

## 2020-09-21 DIAGNOSIS — R82.90 URINE FINDING: Primary | ICD-10-CM

## 2020-09-21 LAB
GLUCOSE (URINE DIPSTICK): 100 MG/DL
MULTISTIX LOT#: 1044 NUMERIC
PH, URINE: 6 (ref 4.5–8)
PROTEIN (URINE DIPSTICK): 100 MG/DL
SPECIFIC GRAVITY: 1.02 (ref 1–1.03)
URINE-COLOR: YELLOW
UROBILINOGEN,SEMI-QN: 0.2 MG/DL (ref 0–1.9)

## 2020-09-21 PROCEDURE — 3078F DIAST BP <80 MM HG: CPT | Performed by: UROLOGY

## 2020-09-21 PROCEDURE — 52310 CYSTOSCOPY AND TREATMENT: CPT | Performed by: UROLOGY

## 2020-09-21 PROCEDURE — 81003 URINALYSIS AUTO W/O SCOPE: CPT | Performed by: UROLOGY

## 2020-09-21 PROCEDURE — 3077F SYST BP >= 140 MM HG: CPT | Performed by: UROLOGY

## 2020-09-21 RX ORDER — CIPROFLOXACIN 500 MG/1
500 TABLET, FILM COATED ORAL ONCE
Status: COMPLETED | OUTPATIENT
Start: 2020-09-21 | End: 2020-09-21

## 2020-09-21 RX ADMIN — CIPROFLOXACIN 500 MG: 500 TABLET, FILM COATED ORAL at 10:46:00

## 2020-09-21 NOTE — PROGRESS NOTES
Rooming Clinician:     Cj Quezada is a 37year old female.   Patient presents with:  Procedure: Cystoscopy        HPI:     Patient has had a complicated urologic course over the last 8 to 9 months with a large staghorn calculus which was X 4 MM Does not apply Misc U 1 PEN NEEDLE NIGHTLY         Peanut Butter Flavor    SWELLING    Comment:Lips swell   Past Medical History:   Diagnosis Date   • Calculus of kidney    • Diabetes ([facility] [address]    • High blood pressure    • History of blood transfusion left ureteral stent was grasped and retrieved. The patient was given a single dose of a prophylactic antibiotic post procedure and advised to return for follow-up as indicated.       BACK: no CVA tenderness  NEURO: grossly normal  EXTREMITIES: no cyanosis,

## 2020-09-23 ENCOUNTER — NURSE ONLY (OUTPATIENT)
Dept: ENDOCRINOLOGY CLINIC | Facility: CLINIC | Age: 40
End: 2020-09-23
Payer: COMMERCIAL

## 2020-09-23 DIAGNOSIS — E11.9 TYPE 2 DIABETES MELLITUS WITHOUT COMPLICATION, WITH LONG-TERM CURRENT USE OF INSULIN (HCC): ICD-10-CM

## 2020-09-23 DIAGNOSIS — Z79.4 TYPE 2 DIABETES MELLITUS WITHOUT COMPLICATION, WITH LONG-TERM CURRENT USE OF INSULIN (HCC): ICD-10-CM

## 2020-09-23 PROCEDURE — 3079F DIAST BP 80-89 MM HG: CPT | Performed by: DIETITIAN, REGISTERED

## 2020-09-23 PROCEDURE — G0108 DIAB MANAGE TRN  PER INDIV: HCPCS | Performed by: DIETITIAN, REGISTERED

## 2020-09-23 PROCEDURE — 3077F SYST BP >= 140 MM HG: CPT | Performed by: DIETITIAN, REGISTERED

## 2020-09-23 NOTE — PATIENT INSTRUCTIONS
1. Continue current doses of Levemir & Novolog;will talk to Dr. Wild Avery to increase Novolog dose  2. Take Levemir 12 hrs apart  3. Take Novolog before the meal not after   4. Allowed 30-45 grams of carb per meal & 15-20 grams of carb for snacks  5.  Follow-up

## 2020-09-24 VITALS
DIASTOLIC BLOOD PRESSURE: 84 MMHG | WEIGHT: 184.63 LBS | HEART RATE: 90 BPM | TEMPERATURE: 99 F | SYSTOLIC BLOOD PRESSURE: 168 MMHG | BODY MASS INDEX: 29 KG/M2

## 2020-09-24 NOTE — PROGRESS NOTES
Chauncey Campos  : 10/15/1980 attended Step 1 Diabetic Education:  Language line used for interpretation  Date: 2020  Referring Provider: Dr. Haylee Guillory  Start time: 3:00pm End time: 4:30pm    BP (!) 168/84 (BP Location: Right arm, Patien Insulin Units Post Pre  Insulin Units Post     9/23/20 233      399       9/22 197         306    9/21 266   221      344    9/20 223         266    9/18 265         282    9/17 242         218    9/16 235         246    9/15 244         268    9/14 277

## 2020-10-03 ENCOUNTER — LAB ENCOUNTER (OUTPATIENT)
Dept: LAB | Facility: HOSPITAL | Age: 40
End: 2020-10-03
Attending: FAMILY MEDICINE
Payer: COMMERCIAL

## 2020-10-03 DIAGNOSIS — E11.65 CONTROLLED TYPE 2 DIABETES MELLITUS WITH HYPERGLYCEMIA, WITH LONG-TERM CURRENT USE OF INSULIN (HCC): ICD-10-CM

## 2020-10-03 DIAGNOSIS — Z79.4 CONTROLLED TYPE 2 DIABETES MELLITUS WITH HYPERGLYCEMIA, WITH LONG-TERM CURRENT USE OF INSULIN (HCC): ICD-10-CM

## 2020-10-03 PROCEDURE — 36415 COLL VENOUS BLD VENIPUNCTURE: CPT

## 2020-10-03 PROCEDURE — 83036 HEMOGLOBIN GLYCOSYLATED A1C: CPT

## 2020-10-03 PROCEDURE — 80053 COMPREHEN METABOLIC PANEL: CPT

## 2020-10-05 ENCOUNTER — TELEPHONE (OUTPATIENT)
Dept: FAMILY MEDICINE CLINIC | Facility: CLINIC | Age: 40
End: 2020-10-05

## 2020-10-05 NOTE — PROGRESS NOTES
Called to speak with Zohreh Shukla regarding her A1C test results and 's instructions for a change in her Detemir insulin. Zohreh Shukla asked if there was someone who speaks [place]  The test results were given to Zohreh Shukla by Nadira Sharp, a SANYD who speaks fluent Span

## 2020-10-05 NOTE — TELEPHONE ENCOUNTER
----- Message from Whit Dubose MD sent at 10/3/2020  8:07 AM CDT -----  Worse numbers from 2 months ago, appointment not for 2 weeks, we need to make adjustments in meds.   Currently she is on insulin detemir 30 units twice daily, increase to 35 units twic

## 2020-10-05 NOTE — TELEPHONE ENCOUNTER
Called to speak with [facility] regarding her A1C test results and 's instructions for a change in her Detemir insulin. Deidraaneudy Louie asked if there was someone who speaks [place]  The test results were given to [facility] by Tyree Ackerman, a SANDY who speaks fluent Span

## 2020-10-16 ENCOUNTER — OFFICE VISIT (OUTPATIENT)
Dept: FAMILY MEDICINE CLINIC | Facility: CLINIC | Age: 40
End: 2020-10-16
Payer: COMMERCIAL

## 2020-10-16 VITALS
WEIGHT: 182.81 LBS | SYSTOLIC BLOOD PRESSURE: 140 MMHG | BODY MASS INDEX: 28.69 KG/M2 | HEART RATE: 70 BPM | RESPIRATION RATE: 18 BRPM | TEMPERATURE: 98 F | DIASTOLIC BLOOD PRESSURE: 90 MMHG | HEIGHT: 67 IN

## 2020-10-16 DIAGNOSIS — Z79.4 TYPE 2 DIABETES MELLITUS WITHOUT COMPLICATION, WITH LONG-TERM CURRENT USE OF INSULIN (HCC): ICD-10-CM

## 2020-10-16 DIAGNOSIS — E55.9 VITAMIN D DEFICIENCY: ICD-10-CM

## 2020-10-16 DIAGNOSIS — E11.65 CONTROLLED TYPE 2 DIABETES MELLITUS WITH HYPERGLYCEMIA, WITH LONG-TERM CURRENT USE OF INSULIN (HCC): Primary | ICD-10-CM

## 2020-10-16 DIAGNOSIS — E78.2 MIXED HYPERLIPIDEMIA: ICD-10-CM

## 2020-10-16 DIAGNOSIS — I10 ESSENTIAL HYPERTENSION: ICD-10-CM

## 2020-10-16 DIAGNOSIS — N20.0 KIDNEY STONE ON LEFT SIDE: ICD-10-CM

## 2020-10-16 DIAGNOSIS — E11.9 TYPE 2 DIABETES MELLITUS WITHOUT COMPLICATION, WITH LONG-TERM CURRENT USE OF INSULIN (HCC): ICD-10-CM

## 2020-10-16 DIAGNOSIS — Z79.4 CONTROLLED TYPE 2 DIABETES MELLITUS WITH HYPERGLYCEMIA, WITH LONG-TERM CURRENT USE OF INSULIN (HCC): Primary | ICD-10-CM

## 2020-10-16 PROCEDURE — 3077F SYST BP >= 140 MM HG: CPT | Performed by: FAMILY MEDICINE

## 2020-10-16 PROCEDURE — 99214 OFFICE O/P EST MOD 30 MIN: CPT | Performed by: FAMILY MEDICINE

## 2020-10-16 PROCEDURE — 3008F BODY MASS INDEX DOCD: CPT | Performed by: FAMILY MEDICINE

## 2020-10-16 PROCEDURE — 90686 IIV4 VACC NO PRSV 0.5 ML IM: CPT | Performed by: FAMILY MEDICINE

## 2020-10-16 PROCEDURE — 90471 IMMUNIZATION ADMIN: CPT | Performed by: FAMILY MEDICINE

## 2020-10-16 PROCEDURE — 3080F DIAST BP >= 90 MM HG: CPT | Performed by: FAMILY MEDICINE

## 2020-10-16 RX ORDER — AMLODIPINE BESYLATE 10 MG/1
10 TABLET ORAL DAILY
COMMUNITY
End: 2020-10-16

## 2020-10-16 RX ORDER — ERGOCALCIFEROL 1.25 MG/1
50000 CAPSULE ORAL
Qty: 13 CAPSULE | Refills: 3 | Status: SHIPPED | OUTPATIENT
Start: 2020-10-16 | End: 2021-10-04

## 2020-10-16 RX ORDER — LOSARTAN POTASSIUM 100 MG/1
100 TABLET ORAL DAILY
Qty: 90 TABLET | Refills: 3 | Status: SHIPPED | OUTPATIENT
Start: 2020-10-16 | End: 2021-10-04

## 2020-10-16 RX ORDER — AMLODIPINE BESYLATE 10 MG/1
10 TABLET ORAL DAILY
Qty: 90 TABLET | Refills: 3 | Status: SHIPPED | OUTPATIENT
Start: 2020-10-16 | End: 2021-10-04

## 2020-10-16 NOTE — PROGRESS NOTES
HPI:   Patient presents with:  Diabetes: 3 month follow up     Sheila Rose is a 41year old female who presents for recheck of her diabetes. Subjective    BP elevateion to 130's /75 at home  Last A1c value was 9.4% done 10/3/2020.      Carrie Normocephalic. Neck: Normal range of motion. Cardiovascular: Normal rate, regular rhythm, S1 normal, S2 normal and normal heart sounds. No murmur heard. Pulses:       Dorsalis pedis pulses are 2+ on the right side and 2+ on the left side.         Po Endocrine    Controlled type 2 diabetes mellitus with hyperglycemia, with long-term current use of insulin (HCC) - Primary    Overview     Levemir 30 BID and novolog 7 TID, A1c 8.6% 5/2020 at [facility]         Current Assessment & Plan     As for her Diabetes, i

## 2020-10-16 NOTE — ASSESSMENT & PLAN NOTE
As for her Diabetes, it is no significant medication side effects noted, borderline controlled. Recommendations are: continue present meds, lose weight by increased dietary compliance and exercise and will check labs as ordered.     Lab Results   Compon

## 2020-10-30 ENCOUNTER — TELEPHONE (OUTPATIENT)
Dept: FAMILY MEDICINE CLINIC | Facility: CLINIC | Age: 40
End: 2020-10-30

## 2020-10-30 NOTE — TELEPHONE ENCOUNTER
Patient called requesting refill on her Vitamin D to be sent to [place], is currently out of medication.

## 2020-10-30 NOTE — TELEPHONE ENCOUNTER
Ref Range & Units 5/12/20  8:52 AM   Vitamin D, 25-Hydroxy   30.0 - 100.0 ng/mL 17.5Low      Last refilled 10/16/2020 to [facility]. Patient notified.

## 2020-11-02 ENCOUNTER — TELEPHONE (OUTPATIENT)
Dept: FAMILY MEDICINE CLINIC | Facility: CLINIC | Age: 40
End: 2020-11-02

## 2020-11-02 DIAGNOSIS — Z79.4 CONTROLLED TYPE 2 DIABETES MELLITUS WITH HYPERGLYCEMIA, WITH LONG-TERM CURRENT USE OF INSULIN (HCC): Primary | ICD-10-CM

## 2020-11-02 DIAGNOSIS — E11.65 CONTROLLED TYPE 2 DIABETES MELLITUS WITH HYPERGLYCEMIA, WITH LONG-TERM CURRENT USE OF INSULIN (HCC): Primary | ICD-10-CM

## 2020-11-02 NOTE — TELEPHONE ENCOUNTER
Patient called states on last visit yeison Bryson was changing her Levimir from 25g to 35mg, states pharmacy not showing the change and needs new script sent

## 2020-11-02 NOTE — TELEPHONE ENCOUNTER
Called patient and she went to the pharmacy and could not get refill because they do not have the order. I called [facility] and they do not have this script on file.  If you look it says it is historical. SO I pended a new script for Dr Carolina Strong

## 2020-11-03 NOTE — TELEPHONE ENCOUNTER
Diagnoses and all orders for this visit:    Controlled type 2 diabetes mellitus with hyperglycemia, with long-term current use of insulin (HCC)  -     insulin detemir 100 UNIT/ML Subcutaneous Solution Pen-injector;  Inject 35 Units into the skin 2 (two) zora

## 2020-11-10 DIAGNOSIS — E11.65 CONTROLLED TYPE 2 DIABETES MELLITUS WITH HYPERGLYCEMIA, WITH LONG-TERM CURRENT USE OF INSULIN (HCC): ICD-10-CM

## 2020-11-10 DIAGNOSIS — Z79.4 CONTROLLED TYPE 2 DIABETES MELLITUS WITH HYPERGLYCEMIA, WITH LONG-TERM CURRENT USE OF INSULIN (HCC): ICD-10-CM

## 2020-11-10 NOTE — TELEPHONE ENCOUNTER
The problem is not the patient but the provider is not registered in\"340B\" will need a new provider to order this

## 2020-11-10 NOTE — TELEPHONE ENCOUNTER
Pt states that [facility] notified her that per her insurance [employer] and under Dr Kamar Pichardo med is not being covered. Can she get another Insulin prescribed?

## 2020-11-13 ENCOUNTER — OFFICE VISIT (OUTPATIENT)
Dept: FAMILY MEDICINE CLINIC | Facility: CLINIC | Age: 40
End: 2020-11-13
Payer: COMMERCIAL

## 2020-11-13 VITALS
TEMPERATURE: 98 F | HEIGHT: 67 IN | WEIGHT: 180.38 LBS | DIASTOLIC BLOOD PRESSURE: 74 MMHG | BODY MASS INDEX: 28.31 KG/M2 | HEART RATE: 72 BPM | RESPIRATION RATE: 18 BRPM | SYSTOLIC BLOOD PRESSURE: 146 MMHG

## 2020-11-13 DIAGNOSIS — Z79.4 CONTROLLED TYPE 2 DIABETES MELLITUS WITH HYPERGLYCEMIA, WITH LONG-TERM CURRENT USE OF INSULIN (HCC): Primary | ICD-10-CM

## 2020-11-13 DIAGNOSIS — I10 ESSENTIAL HYPERTENSION: ICD-10-CM

## 2020-11-13 DIAGNOSIS — E78.2 MIXED HYPERLIPIDEMIA: ICD-10-CM

## 2020-11-13 DIAGNOSIS — E11.65 CONTROLLED TYPE 2 DIABETES MELLITUS WITH HYPERGLYCEMIA, WITH LONG-TERM CURRENT USE OF INSULIN (HCC): Primary | ICD-10-CM

## 2020-11-13 PROCEDURE — 3008F BODY MASS INDEX DOCD: CPT | Performed by: FAMILY MEDICINE

## 2020-11-13 PROCEDURE — 99213 OFFICE O/P EST LOW 20 MIN: CPT | Performed by: FAMILY MEDICINE

## 2020-11-13 PROCEDURE — 3078F DIAST BP <80 MM HG: CPT | Performed by: FAMILY MEDICINE

## 2020-11-13 PROCEDURE — 3077F SYST BP >= 140 MM HG: CPT | Performed by: FAMILY MEDICINE

## 2020-11-13 PROCEDURE — 83036 HEMOGLOBIN GLYCOSYLATED A1C: CPT | Performed by: FAMILY MEDICINE

## 2020-11-13 NOTE — ASSESSMENT & PLAN NOTE
Stable, Continue present management.     Blood Pressure and Cardiac Medications          losartan 100 MG Oral Tab    amLODIPine Besylate 10 MG Oral Tab

## 2020-11-13 NOTE — PROGRESS NOTES
HPI:   Patient presents with:  Diabetes: 4 week follow up /    Sheila Rose is a 41year old female who presents for recheck of her diabetes. Subjective    Down from 0.4 6 weeks ago so on track for 6.4%!   Out of inuslin x 3 days because of well-developed and well-nourished. No distress. HENT:   Head: Normocephalic. Neck: Normal range of motion. Neck supple. Cardiovascular: Normal rate, regular rhythm and normal heart sounds. No murmur heard.   Pulmonary/Chest: Effort normal and breat Orders    HEMOGLOBIN A1C (Completed)       cut to 30 units insulin BID, and call [facility] to fiugure out correct one. Return in about 2 months (around 1/13/2021) for diabetes follow up. Much better.  Continue present management   Kenji Church, 11/13/2020,

## 2020-11-30 ENCOUNTER — TELEPHONE (OUTPATIENT)
Dept: SURGERY | Facility: CLINIC | Age: 40
End: 2020-11-30

## 2020-11-30 DIAGNOSIS — N20.0 KIDNEY STONE: Primary | ICD-10-CM

## 2020-11-30 NOTE — TELEPHONE ENCOUNTER
RN called  back, asking about a CT order. RN explained that patient was last seen at the office on 9/21/20.  See Dr Nadege Martin office notes below:     ASSESSMENT:    Left renal staghorn calculus status post left PCNL and intracorporeal laser lithotrip

## 2020-11-30 NOTE — TELEPHONE ENCOUNTER
Per  ct order is  and needs a new one sent in. Pt has appt with Dr. Tam Vizcarra 12/3 and needs to have ct completed.  Please send thank you

## 2020-11-30 NOTE — TELEPHONE ENCOUNTER
RN placed KUB order and updated . Left message and also reminded him of patient's appt on 12/3 Thursday at 230pm.     \"Recommend KUB as I had already ordered at this time. \" - RP

## 2020-12-02 ENCOUNTER — HOSPITAL ENCOUNTER (OUTPATIENT)
Dept: GENERAL RADIOLOGY | Facility: HOSPITAL | Age: 40
Discharge: HOME | End: 2020-12-02
Attending: UROLOGY
Payer: COMMERCIAL

## 2020-12-02 DIAGNOSIS — N20.0 KIDNEY STONE: ICD-10-CM

## 2020-12-02 PROCEDURE — 74018 RADEX ABDOMEN 1 VIEW: CPT | Performed by: UROLOGY

## 2020-12-03 ENCOUNTER — OFFICE VISIT (OUTPATIENT)
Dept: SURGERY | Facility: CLINIC | Age: 40
End: 2020-12-03
Payer: COMMERCIAL

## 2020-12-03 VITALS — SYSTOLIC BLOOD PRESSURE: 166 MMHG | HEART RATE: 87 BPM | DIASTOLIC BLOOD PRESSURE: 94 MMHG | TEMPERATURE: 99 F

## 2020-12-03 DIAGNOSIS — R82.90 URINE FINDING: Primary | ICD-10-CM

## 2020-12-03 DIAGNOSIS — N20.0 RENAL CALCULUS, LEFT: ICD-10-CM

## 2020-12-03 PROCEDURE — 99213 OFFICE O/P EST LOW 20 MIN: CPT | Performed by: UROLOGY

## 2020-12-03 PROCEDURE — 81003 URINALYSIS AUTO W/O SCOPE: CPT | Performed by: UROLOGY

## 2020-12-03 PROCEDURE — 3077F SYST BP >= 140 MM HG: CPT | Performed by: UROLOGY

## 2020-12-03 PROCEDURE — 3080F DIAST BP >= 90 MM HG: CPT | Performed by: UROLOGY

## 2020-12-03 NOTE — PROGRESS NOTES
Rooming Clinician:     Cj Quezada is a 41year old female. Patient presents with: Follow - Up: c/o kidney stones, CT scan Review        HPI:     Patient returns to the office with her partner. She has had no pain since last visit.   Wilder Balbuena Surgical History:   Procedure Laterality Date   • CYSTOSCOPY URETEROSCOPY Left 8/26/2020    Performed by Kayleen Dozier MD at [facility] MAIN OR   • D & C  2017    after miscarriage   • NEPHROLITHOTOMY PERCUTANEOUS Left 7/22/2020    Performed by Calos Tapia ORAL)(CPT=74176), 7/27/2020, 6:53 AM.  [facility] , XR, XR ABDOMEN (1 VIEW) (CPT=74018), 7/23/2020, 1:25 PM.  TECHNIQUE:  Supine AP view was obtained. PATIENT STATED HISTORY: (As transcribed by Technologist)  The patient has kidney stones.              Pb Estrella understanding of this condition. Diagnoses and all orders for this visit:    Urine finding  -     URINALYSIS, AUTO, W/O SCOPE    Renal calculus, left  -     CALCULI RISK ASSESSMENT PNL, URINE;  Future        Follow up examination in 3 months    The bella

## 2021-01-15 ENCOUNTER — OFFICE VISIT (OUTPATIENT)
Dept: FAMILY MEDICINE CLINIC | Facility: CLINIC | Age: 41
End: 2021-01-15
Payer: COMMERCIAL

## 2021-01-15 VITALS
RESPIRATION RATE: 18 BRPM | BODY MASS INDEX: 29.54 KG/M2 | DIASTOLIC BLOOD PRESSURE: 86 MMHG | WEIGHT: 183.81 LBS | HEIGHT: 66 IN | HEART RATE: 76 BPM | SYSTOLIC BLOOD PRESSURE: 146 MMHG

## 2021-01-15 DIAGNOSIS — Z79.4 CONTROLLED TYPE 2 DIABETES MELLITUS WITH HYPERGLYCEMIA, WITH LONG-TERM CURRENT USE OF INSULIN (HCC): ICD-10-CM

## 2021-01-15 DIAGNOSIS — E11.65 CONTROLLED TYPE 2 DIABETES MELLITUS WITH HYPERGLYCEMIA, WITH LONG-TERM CURRENT USE OF INSULIN (HCC): ICD-10-CM

## 2021-01-15 DIAGNOSIS — E11.9 TYPE 2 DIABETES MELLITUS WITHOUT COMPLICATION, WITH LONG-TERM CURRENT USE OF INSULIN (HCC): Primary | ICD-10-CM

## 2021-01-15 DIAGNOSIS — I10 ESSENTIAL HYPERTENSION: ICD-10-CM

## 2021-01-15 DIAGNOSIS — E78.2 MIXED HYPERLIPIDEMIA: ICD-10-CM

## 2021-01-15 DIAGNOSIS — Z79.4 TYPE 2 DIABETES MELLITUS WITHOUT COMPLICATION, WITH LONG-TERM CURRENT USE OF INSULIN (HCC): Primary | ICD-10-CM

## 2021-01-15 PROCEDURE — 3008F BODY MASS INDEX DOCD: CPT | Performed by: FAMILY MEDICINE

## 2021-01-15 PROCEDURE — 3077F SYST BP >= 140 MM HG: CPT | Performed by: FAMILY MEDICINE

## 2021-01-15 PROCEDURE — 3079F DIAST BP 80-89 MM HG: CPT | Performed by: FAMILY MEDICINE

## 2021-01-15 PROCEDURE — 99214 OFFICE O/P EST MOD 30 MIN: CPT | Performed by: FAMILY MEDICINE

## 2021-01-15 RX ORDER — INSULIN ASPART 100 [IU]/ML
7 INJECTION, SOLUTION INTRAVENOUS; SUBCUTANEOUS
Qty: 15 PEN | Refills: 3 | Status: SHIPPED | OUTPATIENT
Start: 2021-01-15 | End: 2021-12-22 | Stop reason: ALTCHOICE

## 2021-01-15 RX ORDER — PEN NEEDLE, DIABETIC 32GX 5/32"
NEEDLE, DISPOSABLE MISCELLANEOUS
Qty: 500 EACH | Refills: 3 | Status: SHIPPED | OUTPATIENT
Start: 2021-01-15 | End: 2021-12-06

## 2021-01-15 RX ORDER — METOPROLOL SUCCINATE 25 MG/1
25 TABLET, EXTENDED RELEASE ORAL DAILY
Qty: 90 TABLET | Refills: 3 | Status: SHIPPED | OUTPATIENT
Start: 2021-01-15 | End: 2021-12-06

## 2021-01-15 NOTE — PROGRESS NOTES
HPI:   Patient presents with:  Diabetes: 2 mos follow up and  blood presssure     Kamini Mary is a 41year old female who presents for recheck of her diabetes. Subjective    Doing better.  BS -180, before bed 200's  Last A1c value wa place, and time. She appears well-developed and well-nourished. No distress. HENT:   Head: Normocephalic. Neck: Normal range of motion. Neck supple. Cardiovascular: Normal rate, regular rhythm and normal heart sounds. No murmur heard.   Pulses: 5/2020 at [address] [place] [address]     As for her Diabetes, it is reasonably well controlled, no significant medication side effects noted.      Recommendations are: continue present meds, lose weight by increased dietary compliance and exercis

## 2021-01-15 NOTE — PATIENT INSTRUCTIONS
Increase to Levemir 35 units    Once a week you will determine new dose: If your Morning average is more than 130 for fasting levels, add 3 units to your daily insulin. If your Morning average is less than 70, reduce total by 3 units.   If it is between

## 2021-02-28 ENCOUNTER — LAB ENCOUNTER (OUTPATIENT)
Dept: LAB | Facility: HOSPITAL | Age: 41
End: 2021-02-28
Attending: UROLOGY
Payer: COMMERCIAL

## 2021-02-28 DIAGNOSIS — N20.0 RENAL CALCULUS, LEFT: ICD-10-CM

## 2021-02-28 PROCEDURE — 84105 ASSAY OF URINE PHOSPHORUS: CPT

## 2021-02-28 PROCEDURE — 82340 ASSAY OF CALCIUM IN URINE: CPT

## 2021-02-28 PROCEDURE — 84560 ASSAY OF URINE/URIC ACID: CPT

## 2021-02-28 PROCEDURE — 82436 ASSAY OF URINE CHLORIDE: CPT

## 2021-02-28 PROCEDURE — 83945 ASSAY OF OXALATE: CPT

## 2021-02-28 PROCEDURE — 83986 ASSAY PH BODY FLUID NOS: CPT

## 2021-02-28 PROCEDURE — 83735 ASSAY OF MAGNESIUM: CPT

## 2021-02-28 PROCEDURE — 84133 ASSAY OF URINE POTASSIUM: CPT

## 2021-02-28 PROCEDURE — 84300 ASSAY OF URINE SODIUM: CPT

## 2021-02-28 PROCEDURE — 82507 ASSAY OF CITRATE: CPT

## 2021-03-04 LAB
CALCIUM URINE - PER 24H: 389 MG/D
CALCIUM URINE - PER VOL: 13.4 MG/DL
CHLORIDE URINE - PER 24H: 261 MMOL/D
CHLORIDE URINE - PER VOL.: 90 MMOL/L
CITRIC ACID, URINE - MG/DAY: 128 MG/D
CITRIC ACID, URINE - MG/L: 44 MG/L
CREATININE, URINE - PER 24H: 1653 MG/D
CREATININE, URINE - PER VOLUME: 57 MG/DL
HOURS COLLECTED: 24 HR
MAGNESIUM URINE - PER 24H: 191 MG/D
MAGNESIUM URINE - PER VOL: 6.6 MG/DL
OXALATE, URINE - MG/DAY: 46 MG/D
OXALATE, URINE - MG/L: 16 MG/L
PH, URINE: 5.89
PHOSPHORUS URINE - PER 24H: 957 MG/D
PHOSPHORUS URINE - PER VOL: 33 MG/DL
POTASSIUM URINE - PER 24H: 90 MMOL/D
POTASSIUM URINE - PER VOL.: 31 MMOL/L
SODIUM URINE - PER VOL.: 97 MMOL/L
SODIUM URINE -PER 24H: 281 MMOL/D
TOTAL VOLUME: 2900 ML
URIC ACID URINE - PER 24H: 1151 MG/D
URIC ACID URINE - PER VOL: 39.7 MG/DL

## 2021-03-09 ENCOUNTER — OFFICE VISIT (OUTPATIENT)
Dept: SURGERY | Facility: CLINIC | Age: 41
End: 2021-03-09
Payer: COMMERCIAL

## 2021-03-09 VITALS — DIASTOLIC BLOOD PRESSURE: 91 MMHG | HEART RATE: 92 BPM | TEMPERATURE: 99 F | SYSTOLIC BLOOD PRESSURE: 164 MMHG

## 2021-03-09 DIAGNOSIS — N20.0 KIDNEY STONE: ICD-10-CM

## 2021-03-09 DIAGNOSIS — R82.90 URINE FINDING: Primary | ICD-10-CM

## 2021-03-09 LAB
MULTISTIX LOT#: 5077 NUMERIC
NITRITE, URINE: POSITIVE
PH, URINE: 7 (ref 4.5–8)
PROTEIN (URINE DIPSTICK): 300 MG/DL
SPECIFIC GRAVITY: 1.02 (ref 1–1.03)
UROBILINOGEN,SEMI-QN: 0.2 MG/DL (ref 0–1.9)

## 2021-03-09 PROCEDURE — 99213 OFFICE O/P EST LOW 20 MIN: CPT | Performed by: UROLOGY

## 2021-03-09 PROCEDURE — 3080F DIAST BP >= 90 MM HG: CPT | Performed by: UROLOGY

## 2021-03-09 PROCEDURE — 81003 URINALYSIS AUTO W/O SCOPE: CPT | Performed by: UROLOGY

## 2021-03-09 PROCEDURE — 3077F SYST BP >= 140 MM HG: CPT | Performed by: UROLOGY

## 2021-03-09 RX ORDER — POTASSIUM CITRATE 10 MEQ/1
20 TABLET, EXTENDED RELEASE ORAL 2 TIMES DAILY
Qty: 180 TABLET | Refills: 3 | Status: SHIPPED | OUTPATIENT
Start: 2021-03-09 | End: 2021-12-06

## 2021-03-09 RX ORDER — ALLOPURINOL 300 MG/1
300 TABLET ORAL DAILY
Qty: 90 TABLET | Refills: 3 | Status: SHIPPED | OUTPATIENT
Start: 2021-03-09 | End: 2021-06-07

## 2021-03-09 NOTE — PATIENT INSTRUCTIONS
Oxalate (Urine)  Does this test have other names? No.  What is this test?  This is a urine test to see if you have a high level of the chemical oxalate in your urine. Oxalate is a natural end product of metabolism in the body.  It should leave your body sample. For this sample, you must collect all of your urine for 24 hours. Empty your bladder completely first in the morning without collecting it. Note the time.  Then collect your urine every time you go to the bathroom over the next 24 hours.    What jacobo

## 2021-03-10 NOTE — PROGRESS NOTES
Rooming Clinician:     Meghna Connerisac is a 41year old female. Patient presents with: Follow - Up: left renal calculus        HPI:     Patient returns to the office with her partner to discuss results of 24-hour urine collection.   He has no 8/26/2020    Performed by Sebastian George MD at [facility] MAIN OR   • D & C  2017    after miscarriage   • NEPHROLITHOTOMY PERCUTANEOUS Left 7/22/2020    Performed by Sebastian George MD at [facility] MAIN OR      Social History:  Social History    Tobacco Use - 40 mg/d 46 (H)   Sodium Urine -per volume      mmol/L 97   Sodium Urine -per 24H      51 - 286 mmol/d 281   Uric Acid Urine - per vol      mg/dL 39.7   Uric Acid Urine - per 24H      250 - 750 mg/d 1151 (H)   CITRIC ACID, URINE - MG/L      mg/L 44   CITR analysis if often useful to identify risk factors for stone growth and recurrence.  I explained the medical treatment of stone disease which includes liberal fluid hydration to produce 2-2.5 liters of urine daily, dietary restrictions such as reducing meat

## 2021-03-11 ENCOUNTER — TELEPHONE (OUTPATIENT)
Dept: SURGERY | Facility: CLINIC | Age: 41
End: 2021-03-11

## 2021-03-11 DIAGNOSIS — R82.71 BACTERIA IN URINE: Primary | ICD-10-CM

## 2021-03-11 RX ORDER — CEPHALEXIN 500 MG/1
500 CAPSULE ORAL 3 TIMES DAILY
Qty: 42 CAPSULE | Refills: 0 | Status: SHIPPED | OUTPATIENT
Start: 2021-03-11 | End: 2021-03-25

## 2021-03-11 NOTE — TELEPHONE ENCOUNTER
Jordan Rae MD   3/11/2021  2:08 PM CST Back to Top      Your recent urine culture is resistant to Bactrim is abnormal.  Recommend discontinue Bactrim and begin cephalexin 500 mg p.o. 3 times daily for 14 days.  Follow-up urine culture 1 week after

## 2021-03-11 NOTE — TELEPHONE ENCOUNTER
Spoke with Patient adv.  Per Dr Nishi Palomino orders to stop taking Bactrim and start Cephalexin 1 tab TID for 14 days, and complete urine culture 1 week after last dose of cephalexin

## 2021-03-12 ENCOUNTER — TELEPHONE (OUTPATIENT)
Dept: SURGERY | Facility: CLINIC | Age: 41
End: 2021-03-12

## 2021-03-12 NOTE — TELEPHONE ENCOUNTER
RN called patient to relay MD's message and recommendation:\"Your recent urine culture is resistant to Bactrim and is abnormal. Recommend discontinue Bactrim and begin cephalexin 500 mg p.o. 3 times daily for 14 days.  Follow-up urine culture 1 week after

## 2021-03-23 ENCOUNTER — TELEPHONE (OUTPATIENT)
Dept: FAMILY MEDICINE CLINIC | Facility: CLINIC | Age: 41
End: 2021-03-23

## 2021-03-23 NOTE — TELEPHONE ENCOUNTER
Patient us due for pap and for their annual physical please call patient and help them make an appointment  Ask if they see gyn, if they do just schedule them for an annual physical     Routed to [facility]

## 2021-03-29 NOTE — TELEPHONE ENCOUNTER
Pt informed to schedule her WWE w/pap.  She does have a fup on 4/16/21 and at that appt she will schedule her physical (could not change that appt to her physical as not enough time)

## 2021-04-10 ENCOUNTER — HOSPITAL ENCOUNTER (OUTPATIENT)
Dept: GENERAL RADIOLOGY | Facility: HOSPITAL | Age: 41
Discharge: HOME | End: 2021-04-10
Attending: UROLOGY
Payer: COMMERCIAL

## 2021-04-10 ENCOUNTER — LAB ENCOUNTER (OUTPATIENT)
Dept: LAB | Facility: HOSPITAL | Age: 41
End: 2021-04-10
Attending: UROLOGY
Payer: COMMERCIAL

## 2021-04-10 DIAGNOSIS — E11.9 TYPE 2 DIABETES MELLITUS WITHOUT COMPLICATION, WITH LONG-TERM CURRENT USE OF INSULIN (HCC): ICD-10-CM

## 2021-04-10 DIAGNOSIS — R82.71 BACTERIA IN URINE: ICD-10-CM

## 2021-04-10 DIAGNOSIS — N20.0 KIDNEY STONE: ICD-10-CM

## 2021-04-10 DIAGNOSIS — Z79.4 TYPE 2 DIABETES MELLITUS WITHOUT COMPLICATION, WITH LONG-TERM CURRENT USE OF INSULIN (HCC): ICD-10-CM

## 2021-04-10 PROCEDURE — 80053 COMPREHEN METABOLIC PANEL: CPT

## 2021-04-10 PROCEDURE — 82043 UR ALBUMIN QUANTITATIVE: CPT

## 2021-04-10 PROCEDURE — 87086 URINE CULTURE/COLONY COUNT: CPT

## 2021-04-10 PROCEDURE — 80061 LIPID PANEL: CPT

## 2021-04-10 PROCEDURE — 3052F HG A1C>EQUAL 8.0%<EQUAL 9.0%: CPT | Performed by: FAMILY MEDICINE

## 2021-04-10 PROCEDURE — 36415 COLL VENOUS BLD VENIPUNCTURE: CPT

## 2021-04-10 PROCEDURE — 74018 RADEX ABDOMEN 1 VIEW: CPT | Performed by: UROLOGY

## 2021-04-10 PROCEDURE — 83721 ASSAY OF BLOOD LIPOPROTEIN: CPT

## 2021-04-10 PROCEDURE — 87077 CULTURE AEROBIC IDENTIFY: CPT

## 2021-04-10 PROCEDURE — 82570 ASSAY OF URINE CREATININE: CPT

## 2021-04-10 PROCEDURE — 3060F POS MICROALBUMINURIA REV: CPT | Performed by: FAMILY MEDICINE

## 2021-04-10 PROCEDURE — 87186 SC STD MICRODIL/AGAR DIL: CPT

## 2021-04-10 PROCEDURE — 83036 HEMOGLOBIN GLYCOSYLATED A1C: CPT

## 2021-04-11 DIAGNOSIS — E11.9 TYPE 2 DIABETES MELLITUS WITHOUT COMPLICATION, WITH LONG-TERM CURRENT USE OF INSULIN (HCC): ICD-10-CM

## 2021-04-11 DIAGNOSIS — Z79.4 TYPE 2 DIABETES MELLITUS WITHOUT COMPLICATION, WITH LONG-TERM CURRENT USE OF INSULIN (HCC): ICD-10-CM

## 2021-04-12 NOTE — TELEPHONE ENCOUNTER
Requested Prescriptions     Pending Prescriptions Disp Refills   • METFORMIN  MG Oral Tab [Pharmacy Med Name: METFORMIN 850MG TABLETS] 180 tablet 1     Sig: TAKE 1 TABLET BY MOUTH TWICE DAILY WITH MEALS     LOV 1/15/2021     Patient was asked to fol

## 2021-04-13 ENCOUNTER — TELEPHONE (OUTPATIENT)
Dept: SURGERY | Facility: CLINIC | Age: 41
End: 2021-04-13

## 2021-04-13 DIAGNOSIS — R82.71 BACTERIA IN URINE: Primary | ICD-10-CM

## 2021-04-13 RX ORDER — NITROFURANTOIN 25; 75 MG/1; MG/1
100 CAPSULE ORAL 2 TIMES DAILY
Qty: 60 CAPSULE | Refills: 0 | Status: SHIPPED | OUTPATIENT
Start: 2021-04-13 | End: 2021-05-13

## 2021-04-13 NOTE — TELEPHONE ENCOUNTER
----- Message from Zeferino Stafford MD sent at 4/12/2021  7:31 AM CDT -----  Your recent urine culture shows e coli UTI and is abnormal.  Recommend Macrobid one twice daily for 30 days. Repeat urine culture after antibiotic is completed.     Sincerely,  Jennifer

## 2021-04-13 NOTE — TELEPHONE ENCOUNTER
Matilde Milligan #[id number]    Left VM from RP:    \"Your recent urine culture shows e coli UTI and is abnormal.  Recommend Macrobid one twice daily for 30 days. Repeat urine culture after antibiotic is completed. \"

## 2021-04-13 NOTE — TELEPHONE ENCOUNTER
RN called patient. Spoke little Georgia. Relayed MD's message, \"Your recent urine culture shows e coli UTI and is abnormal.  Recommend Macrobid one twice daily for 30 days. Repeat urine culture after antibiotic is completed. \"    RN explained again that s

## 2021-04-30 ENCOUNTER — OFFICE VISIT (OUTPATIENT)
Dept: FAMILY MEDICINE CLINIC | Facility: CLINIC | Age: 41
End: 2021-04-30
Payer: COMMERCIAL

## 2021-04-30 VITALS
SYSTOLIC BLOOD PRESSURE: 140 MMHG | WEIGHT: 186 LBS | HEIGHT: 66 IN | DIASTOLIC BLOOD PRESSURE: 90 MMHG | BODY MASS INDEX: 29.89 KG/M2 | TEMPERATURE: 99 F | RESPIRATION RATE: 20 BRPM | HEART RATE: 96 BPM

## 2021-04-30 DIAGNOSIS — Z79.4 CONTROLLED TYPE 2 DIABETES MELLITUS WITH HYPERGLYCEMIA, WITH LONG-TERM CURRENT USE OF INSULIN (HCC): ICD-10-CM

## 2021-04-30 DIAGNOSIS — E11.9 TYPE 2 DIABETES MELLITUS WITHOUT COMPLICATION, WITH LONG-TERM CURRENT USE OF INSULIN (HCC): ICD-10-CM

## 2021-04-30 DIAGNOSIS — Z00.00 ANNUAL PHYSICAL EXAM: Primary | ICD-10-CM

## 2021-04-30 DIAGNOSIS — I10 ESSENTIAL HYPERTENSION: ICD-10-CM

## 2021-04-30 DIAGNOSIS — E11.65 CONTROLLED TYPE 2 DIABETES MELLITUS WITH HYPERGLYCEMIA, WITH LONG-TERM CURRENT USE OF INSULIN (HCC): ICD-10-CM

## 2021-04-30 DIAGNOSIS — Z79.4 TYPE 2 DIABETES MELLITUS WITHOUT COMPLICATION, WITH LONG-TERM CURRENT USE OF INSULIN (HCC): ICD-10-CM

## 2021-04-30 DIAGNOSIS — E78.2 MIXED HYPERLIPIDEMIA: ICD-10-CM

## 2021-04-30 PROCEDURE — 3008F BODY MASS INDEX DOCD: CPT | Performed by: FAMILY MEDICINE

## 2021-04-30 PROCEDURE — 99396 PREV VISIT EST AGE 40-64: CPT | Performed by: FAMILY MEDICINE

## 2021-04-30 PROCEDURE — 3077F SYST BP >= 140 MM HG: CPT | Performed by: FAMILY MEDICINE

## 2021-04-30 PROCEDURE — 3080F DIAST BP >= 90 MM HG: CPT | Performed by: FAMILY MEDICINE

## 2021-04-30 NOTE — PATIENT INSTRUCTIONS
Dose adjustments. Take your current amount of insulin. LEVEMIR 40 UNITS    Once a week you will determine new dose: If your Morning average is more than 130 for fasting levels, add 3 units to your daily insulin.   If your Morning average is less than 7

## 2021-04-30 NOTE — ASSESSMENT & PLAN NOTE
Stable, Continue present management.     Blood Pressure and Cardiac Medications          Metoprolol Succinate ER 25 MG Oral Tablet 24 Hr    losartan 100 MG Oral Tab    amLODIPine Besylate 10 MG Oral Tab

## 2021-04-30 NOTE — PROGRESS NOTES
Andrea Gutierres is a 41year old female who presents for a complete physical exam.     had concerns including Physical (WWE no pap ).    Her last annual assessment has been over 1 year: Annual Physical Never done       HPI/Subjective:     Sympt Vaccine(1) Never done  Pap Smear,3 Years Never done  Mammogram Never done      Review of Systems   Constitutional: Negative. Negative for activity change, appetite change, chills and fever. HENT: Negative. Eyes: Negative. Respiratory: Negative.   Dylan Nix body mass index is 30.02 kg/m² as calculated from the following:    Height as of this encounter: 5' 6\" (1.676 m). Weight as of this encounter: 186 lb (84.4 kg). Physical Exam  Vitals and nursing note reviewed.    Constitutional:       General: She is times weekly.    Health maintenance, Up to date    Immunizations: Up to date   Immunization History   Administered Date(s) Administered   • FLULAVAL 6 months & older 0.5 ml Prefilled syringe (84329) 10/16/2020         Pt info given for: exercise, low fat di current use of insulin (HCC)    Relevant Medications    metFORMIN HCl 850 MG Oral Tab    insulin detemir 100 UNIT/ML Subcutaneous Solution Pen-injector      Other Visit Diagnoses     Annual physical exam    -  Primary         I am having Antonieta Hartman.  Silvia TALAMANTES

## 2021-04-30 NOTE — ASSESSMENT & PLAN NOTE
As for her Diabetes, it is no significant medication side effects noted, borderline controlled. hyperglycemia    Recommendations are: continue present meds, lose weight by increased dietary compliance and exercise and will check labs as ordered.     Lab Res

## 2021-05-01 ENCOUNTER — TELEPHONE (OUTPATIENT)
Dept: FAMILY MEDICINE CLINIC | Facility: CLINIC | Age: 41
End: 2021-05-01

## 2021-05-01 NOTE — TELEPHONE ENCOUNTER
[facility] has started a PA on   insulin detemir 100 UNIT/ML Subcutaneous Solution Pen-injector  Key# [id number]  Last name Vega   LIAT 10/15/1980    Patient notified and placed in pats inbox.

## 2021-05-05 NOTE — TELEPHONE ENCOUNTER
Entered info into [facility] KARY  (Key: [id number])   received the following message  Please advise the dispensing pharmacy to contact the [facility] Jamie Forman at [phone] for assistance.    Called [facility]  [phone]  Pharmacist will call [phone]#

## 2021-05-13 NOTE — TELEPHONE ENCOUNTER
Talked to patient through  she is having trouble getting needles filled stating the Doctors office is not in [place]. I told her we are in [address] and to call for her needles and if she has trouble to have the pharmacy contact us.

## 2021-05-13 NOTE — TELEPHONE ENCOUNTER
Friend of pt calling on her behalf requesting to have bottle and syringes called in. (miscommunication)

## 2021-05-13 NOTE — TELEPHONE ENCOUNTER
Pt states that her insulin detemir 100 UNIT/ML Subcutaneous Solution Pen-injector is not being covered by her Insurance. .. she is requesting we send in for the needles instead.  [facility]

## 2021-05-13 NOTE — TELEPHONE ENCOUNTER
She is using [facility] and they won't cover pens pended orders for insulin vial and insulin syringe

## 2021-06-05 ENCOUNTER — LAB ENCOUNTER (OUTPATIENT)
Dept: LAB | Facility: HOSPITAL | Age: 41
End: 2021-06-05
Attending: FAMILY MEDICINE
Payer: COMMERCIAL

## 2021-06-05 DIAGNOSIS — R82.71 BACTERIA IN URINE: ICD-10-CM

## 2021-06-05 PROCEDURE — 87086 URINE CULTURE/COLONY COUNT: CPT

## 2021-06-05 PROCEDURE — 87077 CULTURE AEROBIC IDENTIFY: CPT

## 2021-06-05 PROCEDURE — 87186 SC STD MICRODIL/AGAR DIL: CPT

## 2021-06-08 ENCOUNTER — OFFICE VISIT (OUTPATIENT)
Dept: SURGERY | Facility: CLINIC | Age: 41
End: 2021-06-08

## 2021-06-08 VITALS — SYSTOLIC BLOOD PRESSURE: 179 MMHG | HEART RATE: 80 BPM | DIASTOLIC BLOOD PRESSURE: 108 MMHG | TEMPERATURE: 98 F

## 2021-06-08 DIAGNOSIS — R82.994 HYPERCALCIURIA: ICD-10-CM

## 2021-06-08 DIAGNOSIS — N20.0 KIDNEY STONE: ICD-10-CM

## 2021-06-08 DIAGNOSIS — R82.71 BACTERIA IN URINE: ICD-10-CM

## 2021-06-08 DIAGNOSIS — R82.90 URINE FINDING: Primary | ICD-10-CM

## 2021-06-08 DIAGNOSIS — R82.992 HYPEROXALURIA: ICD-10-CM

## 2021-06-08 DIAGNOSIS — R82.991 HYPOCITRATURIA: ICD-10-CM

## 2021-06-08 DIAGNOSIS — R82.993 HYPERURICOSURIA: ICD-10-CM

## 2021-06-08 DIAGNOSIS — N39.0 RECURRENT UTI: ICD-10-CM

## 2021-06-08 PROCEDURE — 99213 OFFICE O/P EST LOW 20 MIN: CPT | Performed by: UROLOGY

## 2021-06-08 PROCEDURE — 3080F DIAST BP >= 90 MM HG: CPT | Performed by: UROLOGY

## 2021-06-08 PROCEDURE — 81003 URINALYSIS AUTO W/O SCOPE: CPT | Performed by: UROLOGY

## 2021-06-08 PROCEDURE — 3077F SYST BP >= 140 MM HG: CPT | Performed by: UROLOGY

## 2021-06-08 RX ORDER — CEPHALEXIN 500 MG/1
CAPSULE ORAL
Qty: 42 CAPSULE | Refills: 1 | Status: SHIPPED | OUTPATIENT
Start: 2021-06-08 | End: 2021-10-04 | Stop reason: ALTCHOICE

## 2021-06-08 RX ORDER — CEPHALEXIN 500 MG/1
CAPSULE ORAL
Qty: 30 CAPSULE | Refills: 1 | Status: SHIPPED | OUTPATIENT
Start: 2021-06-08 | End: 2021-06-08 | Stop reason: CLARIF

## 2021-06-08 NOTE — PROGRESS NOTES
Rooming Clinician:     Terri Wilson is a 41year old female. Patient presents with: Follow - Up: Last friday 1 episode of left flank mild pain. Tylenol helped.  Urine has been dark        HPI:     Patient returns to the office with some mouth 2 (two) times a day. 180 tablet 3   • Insulin Aspart Pen (NOVOLOG FLEXPEN) 100 UNIT/ML Subcutaneous Solution Pen-injector Inject 7 Units into the skin 3 (three) times daily before meals.  15 pen 3   • BD PEN NEEDLE JULIOCESAR U/F 32G X 4 MM Does not apply M rashes,no suspicious lesions  HEENT: atraumatic, normocephalic,ears and throat are clear  NECK: supple  LUNGS: normal respiratory motion without distress  CARDIO: normal peripheral perfusion  GI: no mass, tenderness or organomegaly  BACK: no CVA tenderness tablet p.o. 3 times daily, refill x1 if needed        Follow up examination in 2 months    The patient indicates understanding of these issues and agrees to the plan. Corinna Garcia M.D.   Urology

## 2021-06-09 ENCOUNTER — TELEPHONE (OUTPATIENT)
Dept: FAMILY MEDICINE CLINIC | Facility: CLINIC | Age: 41
End: 2021-06-09

## 2021-06-09 ENCOUNTER — TELEPHONE (OUTPATIENT)
Dept: SURGERY | Facility: CLINIC | Age: 41
End: 2021-06-09

## 2021-06-09 NOTE — TELEPHONE ENCOUNTER
I called and spoke to LÄNGHEM. The pt saw RP yesterday for kidney stones. He stated that she was okay then, but starting last night her let flank pain increased and Tylenol did not help. Now she is rating pain 9 out of 10.       Pt denies  fever, chills,

## 2021-06-09 NOTE — TELEPHONE ENCOUNTER
Message  Received: 2 days ago  Francheska Blanca, [facility] Staff  Dr. Kina Chaudhry in basket:   Appears patient has UTI in Epic (culture not in results basket but in patient's chart). Also seems she is following with urology.  I cannot see this has been tr

## 2021-06-09 NOTE — TELEPHONE ENCOUNTER
Jamison Calvo MD  You 19 minutes ago (2:52 PM)     Henrry sent    Message text [Annual Visit] : annual visit

## 2021-06-09 NOTE — TELEPHONE ENCOUNTER
RN called patient to relay that Dr Huan Cedillo sent the order of Norco to be taken 1-2 tabs PO every 4hours for pain. She verbalized understanding and agreeable to plans.

## 2021-07-11 ENCOUNTER — LAB ENCOUNTER (OUTPATIENT)
Dept: LAB | Facility: HOSPITAL | Age: 41
End: 2021-07-11
Attending: UROLOGY
Payer: COMMERCIAL

## 2021-07-11 LAB
BILIRUB UR QL STRIP.AUTO: NEGATIVE
COLOR UR AUTO: YELLOW
GLUCOSE UR STRIP.AUTO-MCNC: 50 MG/DL
HYALINE CASTS #/AREA URNS AUTO: PRESENT /LPF
KETONES UR STRIP.AUTO-MCNC: NEGATIVE MG/DL
NITRITE UR QL STRIP.AUTO: POSITIVE
PH UR STRIP.AUTO: 6 [PH] (ref 5–8)
PROT UR STRIP.AUTO-MCNC: >=500 MG/DL
RBC #/AREA URNS AUTO: >10 /HPF
SP GR UR STRIP.AUTO: 1.01 (ref 1–1.03)
UROBILINOGEN UR STRIP.AUTO-MCNC: <2 MG/DL
WBC #/AREA URNS AUTO: >50 /HPF
WBC CLUMPS UR QL AUTO: PRESENT /HPF

## 2021-07-11 PROCEDURE — 81001 URINALYSIS AUTO W/SCOPE: CPT | Performed by: UROLOGY

## 2021-07-11 PROCEDURE — 87086 URINE CULTURE/COLONY COUNT: CPT | Performed by: UROLOGY

## 2021-07-11 PROCEDURE — 87186 SC STD MICRODIL/AGAR DIL: CPT | Performed by: UROLOGY

## 2021-07-11 PROCEDURE — 87077 CULTURE AEROBIC IDENTIFY: CPT | Performed by: UROLOGY

## 2021-07-14 ENCOUNTER — TELEPHONE (OUTPATIENT)
Dept: SURGERY | Facility: CLINIC | Age: 41
End: 2021-07-14

## 2021-07-14 DIAGNOSIS — R82.71 BACTERIA IN URINE: Primary | ICD-10-CM

## 2021-07-14 NOTE — TELEPHONE ENCOUNTER
LMTCB    ----- Message from Alexander Horton MD sent at 7/13/2021  2:56 PM CDT -----  Your recent urine culture  is abnormal.  Have you been taking Macrodantin or Macrobid as prophylaxis?   We need to try to sterilize the urine completely and then consider

## 2021-07-16 NOTE — TELEPHONE ENCOUNTER
This RN is returning a call to patient in response to a missed call from [place], [place] on 7/14. RN called patient to relay MD's message and recommendations. No answer. Left message to call back office.

## 2021-07-19 NOTE — TELEPHONE ENCOUNTER
Second call. This RN is returning a call to patient in response to a missed call from [place], [place] on 7/14. RN called patient to relay MD's message and recommendations:        Spoke to patient. Unsure which antibiotic she is on. Currently, she is at work.  Sh

## 2021-07-20 ENCOUNTER — TELEPHONE (OUTPATIENT)
Dept: SURGERY | Facility: CLINIC | Age: 41
End: 2021-07-20

## 2021-07-20 RX ORDER — NITROFURANTOIN 25; 75 MG/1; MG/1
100 CAPSULE ORAL 2 TIMES DAILY
Qty: 60 CAPSULE | Refills: 0 | Status: SHIPPED | OUTPATIENT
Start: 2021-07-20 | End: 2021-08-12

## 2021-07-20 NOTE — TELEPHONE ENCOUNTER
Encounter is closed. Pt will take the Macrodantin RX called sent in to pharmacy by another RN        Spoke with patient via telephone using  service. [facility] #[id number]. Verified name and . Message from Dr. Woodroe Alpers relayed to patient.  She stated

## 2021-07-20 NOTE — TELEPHONE ENCOUNTER
Third call. This RN is returning a call to Cee Jones, friend of patient in response to his call:      \"Per boyfriend (hipaa) returning a call for results. Please advise\"    No answer. RN left message. RN also called patient again.  She reports completing the

## 2021-07-30 ENCOUNTER — OFFICE VISIT (OUTPATIENT)
Dept: FAMILY MEDICINE CLINIC | Facility: CLINIC | Age: 41
End: 2021-07-30
Payer: COMMERCIAL

## 2021-07-30 VITALS
DIASTOLIC BLOOD PRESSURE: 72 MMHG | HEIGHT: 65.5 IN | BODY MASS INDEX: 30.45 KG/M2 | WEIGHT: 185 LBS | RESPIRATION RATE: 16 BRPM | SYSTOLIC BLOOD PRESSURE: 130 MMHG | HEART RATE: 86 BPM

## 2021-07-30 DIAGNOSIS — Z79.4 CONTROLLED TYPE 2 DIABETES MELLITUS WITH HYPERGLYCEMIA, WITH LONG-TERM CURRENT USE OF INSULIN (HCC): Primary | ICD-10-CM

## 2021-07-30 DIAGNOSIS — I10 ESSENTIAL HYPERTENSION: ICD-10-CM

## 2021-07-30 DIAGNOSIS — E78.2 MIXED HYPERLIPIDEMIA: ICD-10-CM

## 2021-07-30 DIAGNOSIS — E11.65 CONTROLLED TYPE 2 DIABETES MELLITUS WITH HYPERGLYCEMIA, WITH LONG-TERM CURRENT USE OF INSULIN (HCC): Primary | ICD-10-CM

## 2021-07-30 DIAGNOSIS — Z79.4 TYPE 2 DIABETES MELLITUS WITHOUT COMPLICATION, WITH LONG-TERM CURRENT USE OF INSULIN (HCC): ICD-10-CM

## 2021-07-30 DIAGNOSIS — E11.9 TYPE 2 DIABETES MELLITUS WITHOUT COMPLICATION, WITH LONG-TERM CURRENT USE OF INSULIN (HCC): ICD-10-CM

## 2021-07-30 LAB
CARTRIDGE LOT#: 814 NUMERIC
HEMOGLOBIN A1C: 9.1 % (ref 4.3–5.6)

## 2021-07-30 PROCEDURE — 3046F HEMOGLOBIN A1C LEVEL >9.0%: CPT | Performed by: FAMILY MEDICINE

## 2021-07-30 PROCEDURE — 83036 HEMOGLOBIN GLYCOSYLATED A1C: CPT | Performed by: FAMILY MEDICINE

## 2021-07-30 PROCEDURE — 3075F SYST BP GE 130 - 139MM HG: CPT | Performed by: FAMILY MEDICINE

## 2021-07-30 PROCEDURE — 3008F BODY MASS INDEX DOCD: CPT | Performed by: FAMILY MEDICINE

## 2021-07-30 PROCEDURE — 3078F DIAST BP <80 MM HG: CPT | Performed by: FAMILY MEDICINE

## 2021-07-30 PROCEDURE — 99214 OFFICE O/P EST MOD 30 MIN: CPT | Performed by: FAMILY MEDICINE

## 2021-07-30 RX ORDER — INSULIN ASPART 100 [IU]/ML
7 INJECTION, SOLUTION INTRAVENOUS; SUBCUTANEOUS
Qty: 30 ML | Refills: 3 | Status: SHIPPED | OUTPATIENT
Start: 2021-07-30 | End: 2021-12-06

## 2021-07-30 RX ORDER — INSULIN DETEMIR 100 [IU]/ML
50 INJECTION, SOLUTION SUBCUTANEOUS EVERY 12 HOURS
Qty: 100 ML | Refills: 3 | Status: SHIPPED | OUTPATIENT
Start: 2021-07-30 | End: 2021-10-04

## 2021-07-30 NOTE — PATIENT INSTRUCTIONS
Dose adjustments. Take your current amount of insulin. 44 unit Levemir daily    Once a week you will determine new dose: If your Morning average is more than 130 for fasting levels, add 3 units to your daily insulin.   If your Morning average is less t

## 2021-07-30 NOTE — PROGRESS NOTES
had concerns including Diabetes (3 months follow up). Alexis Choe is a 41year old female who presents for recheck of her diabetes. HPI/Subjective: Increased AM insulin, Levemir 40 and AM FBS in 200.    Last A1c value was 9.1% done 7/3 Heart sounds: Normal heart sounds. No murmur heard. Pulmonary:      Effort: Pulmonary effort is normal. No respiratory distress. Breath sounds: Normal breath sounds. No wheezing.    Abdominal:      General: Bowel sounds are normal.      Palpat ordered.     Lab Results   Component Value Date    A1C 8.3 (H) 04/10/2021    A1C 7.8 (A) 11/13/2020      Blood Sugar Medications          insulin detemir 100 UNIT/ML Subcutaneous Solution    metFORMIN HCl 850 MG Oral Tab    insulin detemir 100 UNIT/ML Subcu

## 2021-08-04 ENCOUNTER — TELEPHONE (OUTPATIENT)
Dept: SURGERY | Facility: CLINIC | Age: 41
End: 2021-08-04

## 2021-08-04 NOTE — TELEPHONE ENCOUNTER
RN followed up on this recent antibiotic (Nitrofurantoin). It needs PA, but when RN tried to initiate the PA, eligibility not found. RN unable to reconfirm the patient's health care coverage. RN called [facility]: [phone] for ID: [id number].  No a

## 2021-08-05 NOTE — TELEPHONE ENCOUNTER
ENCOUNTER IS CLOSED. This RN spoke to MD regarding patient's medication. RN called patient to inquire if she started the Nitrofurantoin prescribed by Dr Reyes Kennedy. She said, she did pick it up and is taking it.      RN spoke to [employer] staff to inquire

## 2021-08-07 PROCEDURE — 82507 ASSAY OF CITRATE: CPT

## 2021-08-07 PROCEDURE — 84133 ASSAY OF URINE POTASSIUM: CPT

## 2021-08-07 PROCEDURE — 82340 ASSAY OF CALCIUM IN URINE: CPT

## 2021-08-07 PROCEDURE — 83945 ASSAY OF OXALATE: CPT

## 2021-08-07 PROCEDURE — 83735 ASSAY OF MAGNESIUM: CPT

## 2021-08-07 PROCEDURE — 83986 ASSAY PH BODY FLUID NOS: CPT

## 2021-08-07 PROCEDURE — 84300 ASSAY OF URINE SODIUM: CPT

## 2021-08-07 PROCEDURE — 84105 ASSAY OF URINE PHOSPHORUS: CPT

## 2021-08-07 PROCEDURE — 82436 ASSAY OF URINE CHLORIDE: CPT

## 2021-08-07 PROCEDURE — 84560 ASSAY OF URINE/URIC ACID: CPT

## 2021-08-08 ENCOUNTER — LAB ENCOUNTER (OUTPATIENT)
Dept: LAB | Facility: HOSPITAL | Age: 41
End: 2021-08-08
Attending: UROLOGY
Payer: COMMERCIAL

## 2021-08-08 DIAGNOSIS — N20.0 KIDNEY STONE: ICD-10-CM

## 2021-08-10 ENCOUNTER — OFFICE VISIT (OUTPATIENT)
Dept: SURGERY | Facility: CLINIC | Age: 41
End: 2021-08-10
Payer: COMMERCIAL

## 2021-08-10 ENCOUNTER — HOSPITAL ENCOUNTER (OUTPATIENT)
Dept: GENERAL RADIOLOGY | Facility: HOSPITAL | Age: 41
Discharge: HOME | End: 2021-08-10
Attending: UROLOGY
Payer: COMMERCIAL

## 2021-08-10 DIAGNOSIS — N20.0 KIDNEY STONE: Primary | ICD-10-CM

## 2021-08-10 DIAGNOSIS — N20.0 RENAL CALCULUS, LEFT: ICD-10-CM

## 2021-08-10 DIAGNOSIS — R82.992 HYPEROXALURIA: ICD-10-CM

## 2021-08-10 DIAGNOSIS — N20.0 KIDNEY STONE: ICD-10-CM

## 2021-08-10 DIAGNOSIS — R82.993 HYPERURICOSURIA: ICD-10-CM

## 2021-08-10 DIAGNOSIS — R82.994 HYPERCALCIURIA: ICD-10-CM

## 2021-08-10 DIAGNOSIS — N39.0 RECURRENT UTI: ICD-10-CM

## 2021-08-10 DIAGNOSIS — R82.991 HYPOCITRATURIA: ICD-10-CM

## 2021-08-10 LAB
BILIRUB UR QL: NEGATIVE
BILIRUBIN: NEGATIVE
COLOR UR: YELLOW
GLUCOSE (URINE DIPSTICK): 250 MG/DL
GLUCOSE UR-MCNC: >=500 MG/DL
KETONES (URINE DIPSTICK): NEGATIVE MG/DL
KETONES UR-MCNC: NEGATIVE MG/DL
MULTISTIX LOT#: ABNORMAL NUMERIC
NITRITE UR QL STRIP.AUTO: POSITIVE
NITRITE, URINE: POSITIVE
PH UR: 7 [PH] (ref 5–8)
PH, URINE: 7 (ref 4.5–8)
PROT UR-MCNC: >=500 MG/DL
PROTEIN (URINE DIPSTICK): 100 MG/DL
RBC #/AREA URNS AUTO: >10 /HPF
SP GR UR STRIP: 1.01 (ref 1–1.03)
SPECIFIC GRAVITY: >=1.03 (ref 1–1.03)
UROBILINOGEN UR STRIP-ACNC: <2
UROBILINOGEN,SEMI-QN: 0.2 MG/DL (ref 0–1.9)
WBC #/AREA URNS AUTO: >50 /HPF
WBC CLUMPS UR QL AUTO: PRESENT /HPF

## 2021-08-10 PROCEDURE — 82365 CALCULUS SPECTROSCOPY: CPT | Performed by: UROLOGY

## 2021-08-10 PROCEDURE — 74018 RADEX ABDOMEN 1 VIEW: CPT | Performed by: UROLOGY

## 2021-08-10 PROCEDURE — 99213 OFFICE O/P EST LOW 20 MIN: CPT | Performed by: UROLOGY

## 2021-08-10 PROCEDURE — 81003 URINALYSIS AUTO W/O SCOPE: CPT | Performed by: UROLOGY

## 2021-08-10 NOTE — PROGRESS NOTES
Rooming Clinician:     Masha Walton is a 41year old female. Patient presents with:   Follow - Up: Brought kidney stone samples; to discuss plans  Other: Denies urinary symptoms; still on Nitrofurantoin        HPI:     Patient comes back Solution Pen-injector Inject 40 Units into the skin 2 (two) times daily. 15 pen 1   • Potassium Citrate ER 10 MEQ (1080 MG) Oral Tab CR Take 2 tablets (20 mEq total) by mouth 2 (two) times a day.  180 tablet 3   • Insulin Aspart Pen (NOVOLOG FLEXPEN) 100 UN complaint    EXAM:     [facility] 08/22/2020   GENERAL: well developed, well nourished,in no apparent distress  SKIN: no rashes,no suspicious lesions  HEENT: atraumatic, normocephalic,ears and throat are clear  NECK: supple  LUNGS: normal respiratory motion witho

## 2021-08-12 LAB
CALCIUM URINE - PER 24H: 171 MG/D
CALCIUM URINE - PER VOL: 5.7 MG/DL
CHLORIDE URINE - PER 24H: 162 MMOL/D
CHLORIDE URINE - PER VOL.: 54 MMOL/L
CITRIC ACID, URINE - MG/DAY: 426 MG/D
CITRIC ACID, URINE - MG/L: 142 MG/L
CREATININE, URINE - PER 24H: 990 MG/D
CREATININE, URINE - PER VOLUME: 33 MG/DL
HOURS COLLECTED: 24 HR
MAGNESIUM URINE - PER 24H: 123 MG/D
MAGNESIUM URINE - PER VOL: 4.1 MG/DL
OXALATE, URINE - MG/DAY: 39 MG/D
OXALATE, URINE - MG/L: 13 MG/L
PH, URINE: 6.65
PHOSPHORUS URINE - PER 24H: 840 MG/D
PHOSPHORUS URINE - PER VOL: 28 MG/DL
POTASSIUM URINE - PER 24H: 84 MMOL/D
POTASSIUM URINE - PER VOL.: 28 MMOL/L
SODIUM URINE - PER VOL.: 64 MMOL/L
SODIUM URINE -PER 24H: 192 MMOL/D
TOTAL VOLUME: 3000 ML
URIC ACID URINE - PER 24H: 390 MG/D
URIC ACID URINE - PER VOL: 13 MG/DL

## 2021-08-13 LAB — CALCULI MASS: 178 MG

## 2021-08-16 ENCOUNTER — TELEPHONE (OUTPATIENT)
Dept: SURGERY | Facility: CLINIC | Age: 41
End: 2021-08-16

## 2021-08-16 NOTE — TELEPHONE ENCOUNTER
This RN patient to relay stone analysis and culture results along with MD's message and recommendations:                 Patient verbalized understanding to plans.  Discontinue Nitrofurantoin,  new medication: Cephalexin 500mg TID x 3 weeks, need uri

## 2021-08-16 NOTE — PROGRESS NOTES
Your recent 24 hour urine collections shows urinary calcium has become normal as well as oxalate is normal.  Recommend current medications and liberal fluid hydration.     Sincerely,  Chris Arellano MD

## 2021-10-02 RX ORDER — ALLOPURINOL 300 MG/1
TABLET ORAL
COMMUNITY
Start: 2021-08-04

## 2021-10-02 RX ORDER — CALCIUM CARB/VITAMIN D3/VIT K1 500-100-40
1 TABLET,CHEWABLE ORAL 2 TIMES DAILY
COMMUNITY
Start: 2021-07-24 | End: 2021-12-06

## 2021-10-04 ENCOUNTER — OFFICE VISIT (OUTPATIENT)
Dept: FAMILY MEDICINE CLINIC | Facility: CLINIC | Age: 41
End: 2021-10-04
Payer: COMMERCIAL

## 2021-10-04 VITALS
DIASTOLIC BLOOD PRESSURE: 90 MMHG | RESPIRATION RATE: 16 BRPM | HEIGHT: 65.75 IN | WEIGHT: 185.63 LBS | SYSTOLIC BLOOD PRESSURE: 178 MMHG | BODY MASS INDEX: 30.19 KG/M2 | TEMPERATURE: 98 F | HEART RATE: 72 BPM

## 2021-10-04 DIAGNOSIS — Z79.4 CONTROLLED TYPE 2 DIABETES MELLITUS WITH HYPERGLYCEMIA, WITH LONG-TERM CURRENT USE OF INSULIN (HCC): Primary | ICD-10-CM

## 2021-10-04 DIAGNOSIS — E78.2 MIXED HYPERLIPIDEMIA: ICD-10-CM

## 2021-10-04 DIAGNOSIS — E11.9 TYPE 2 DIABETES MELLITUS WITHOUT COMPLICATION, WITH LONG-TERM CURRENT USE OF INSULIN (HCC): ICD-10-CM

## 2021-10-04 DIAGNOSIS — Z79.4 TYPE 2 DIABETES MELLITUS WITHOUT COMPLICATION, WITH LONG-TERM CURRENT USE OF INSULIN (HCC): ICD-10-CM

## 2021-10-04 DIAGNOSIS — I10 ESSENTIAL HYPERTENSION: ICD-10-CM

## 2021-10-04 DIAGNOSIS — E55.9 VITAMIN D DEFICIENCY: ICD-10-CM

## 2021-10-04 DIAGNOSIS — E11.65 CONTROLLED TYPE 2 DIABETES MELLITUS WITH HYPERGLYCEMIA, WITH LONG-TERM CURRENT USE OF INSULIN (HCC): Primary | ICD-10-CM

## 2021-10-04 DIAGNOSIS — I10 WHITE COAT SYNDROME WITH DIAGNOSIS OF HYPERTENSION: ICD-10-CM

## 2021-10-04 DIAGNOSIS — Z12.31 SCREENING MAMMOGRAM FOR BREAST CANCER: ICD-10-CM

## 2021-10-04 PROCEDURE — 3008F BODY MASS INDEX DOCD: CPT | Performed by: FAMILY MEDICINE

## 2021-10-04 PROCEDURE — 3077F SYST BP >= 140 MM HG: CPT | Performed by: FAMILY MEDICINE

## 2021-10-04 PROCEDURE — 3080F DIAST BP >= 90 MM HG: CPT | Performed by: FAMILY MEDICINE

## 2021-10-04 PROCEDURE — 99214 OFFICE O/P EST MOD 30 MIN: CPT | Performed by: FAMILY MEDICINE

## 2021-10-04 RX ORDER — LOSARTAN POTASSIUM 100 MG/1
100 TABLET ORAL DAILY
Qty: 90 TABLET | Refills: 3 | Status: SHIPPED | OUTPATIENT
Start: 2021-10-04

## 2021-10-04 RX ORDER — ERGOCALCIFEROL 1.25 MG/1
50000 CAPSULE ORAL
Qty: 7 CAPSULE | Refills: 3 | Status: SHIPPED | OUTPATIENT
Start: 2021-10-04

## 2021-10-04 RX ORDER — HYDROCHLOROTHIAZIDE 25 MG/1
25 TABLET ORAL DAILY
Qty: 90 TABLET | Refills: 3 | Status: SHIPPED | OUTPATIENT
Start: 2021-10-04

## 2021-10-04 RX ORDER — INSULIN DETEMIR 100 [IU]/ML
50 INJECTION, SOLUTION SUBCUTANEOUS EVERY 12 HOURS
Qty: 100 ML | Refills: 3 | Status: SHIPPED | OUTPATIENT
Start: 2021-10-04

## 2021-10-04 RX ORDER — AMLODIPINE BESYLATE 10 MG/1
10 TABLET ORAL DAILY
Qty: 90 TABLET | Refills: 3 | Status: SHIPPED | OUTPATIENT
Start: 2021-10-04

## 2021-10-05 NOTE — PATIENT INSTRUCTIONS
You can schedule this by calling Central Scheduling at [phone] or visit the online scheduling site at [email or web address]

## 2021-10-05 NOTE — PROGRESS NOTES
had concerns including Diabetes (2 month f/u. Needs levimir refill) and Blood Pressure (2 month f/u  BP elevated times 2. 206/100 and 184/110. Out of medication for 2 days. ).    Terri Wilson is a 41year old female who presents for recheck o Constitutional:       General: She is not in acute distress. Appearance: Normal appearance. HENT:      Head: Normocephalic and atraumatic.       Right Ear: Tympanic membrane and external ear normal.      Left Ear: Tympanic membrane and external ear General: No focal deficit present. Mental Status: She is alert and oriented to person, place, and time. Psychiatric:         Mood and Affect: Mood normal.         Behavior: Behavior normal.         Thought Content:  Thought content normal.         Indiana Álvarez Orders:  -     Losartan Potassium; Take 1 tablet (100 mg total) by mouth daily. Dispense: 90 tablet; Refill: 3  -     amLODIPine Besylate; Take 1 tablet (10 mg total) by mouth daily. Dispense: 90 tablet; Refill: 3  -     hydroCHLOROthiazide;  Take 1

## 2021-11-03 RX ORDER — INSULIN DETEMIR 100 [IU]/ML
INJECTION, SOLUTION SUBCUTANEOUS
Qty: 30 ML | Refills: 1 | Status: SHIPPED | OUTPATIENT
Start: 2021-11-03 | End: 2021-12-06 | Stop reason: ALTCHOICE

## 2021-12-04 ENCOUNTER — LAB ENCOUNTER (OUTPATIENT)
Dept: LAB | Facility: HOSPITAL | Age: 41
End: 2021-12-04
Attending: FAMILY MEDICINE
Payer: COMMERCIAL

## 2021-12-04 DIAGNOSIS — E11.9 TYPE 2 DIABETES MELLITUS WITHOUT COMPLICATION, WITH LONG-TERM CURRENT USE OF INSULIN (HCC): ICD-10-CM

## 2021-12-04 DIAGNOSIS — Z79.4 TYPE 2 DIABETES MELLITUS WITHOUT COMPLICATION, WITH LONG-TERM CURRENT USE OF INSULIN (HCC): ICD-10-CM

## 2021-12-04 DIAGNOSIS — E55.9 VITAMIN D DEFICIENCY: ICD-10-CM

## 2021-12-04 PROCEDURE — 80053 COMPREHEN METABOLIC PANEL: CPT

## 2021-12-04 PROCEDURE — 83036 HEMOGLOBIN GLYCOSYLATED A1C: CPT

## 2021-12-04 PROCEDURE — 84443 ASSAY THYROID STIM HORMONE: CPT

## 2021-12-04 PROCEDURE — 85025 COMPLETE CBC W/AUTO DIFF WBC: CPT

## 2021-12-04 PROCEDURE — 3052F HG A1C>EQUAL 8.0%<EQUAL 9.0%: CPT | Performed by: FAMILY MEDICINE

## 2021-12-04 PROCEDURE — 82306 VITAMIN D 25 HYDROXY: CPT

## 2021-12-04 PROCEDURE — 36415 COLL VENOUS BLD VENIPUNCTURE: CPT

## 2021-12-04 PROCEDURE — 80061 LIPID PANEL: CPT

## 2021-12-04 RX ORDER — ATORVASTATIN CALCIUM 10 MG/1
10 TABLET, FILM COATED ORAL DAILY
COMMUNITY
Start: 2021-09-24

## 2021-12-04 RX ORDER — EMPAGLIFLOZIN AND METFORMIN HYDROCHLORIDE 12.5; 1 MG/1; MG/1
1 TABLET ORAL 2 TIMES DAILY
COMMUNITY
Start: 2021-09-24

## 2021-12-04 NOTE — ASSESSMENT & PLAN NOTE
Stable, Continue present management.     Blood Pressure and Cardiac Medications          metoprolol succinate 50 MG Oral Tablet 24 Hr    losartan 100 MG Oral Tab    amLODIPine 10 MG Oral Tab

## 2021-12-06 ENCOUNTER — OFFICE VISIT (OUTPATIENT)
Dept: FAMILY MEDICINE CLINIC | Facility: CLINIC | Age: 41
End: 2021-12-06
Payer: COMMERCIAL

## 2021-12-06 VITALS
HEIGHT: 65.5 IN | DIASTOLIC BLOOD PRESSURE: 80 MMHG | SYSTOLIC BLOOD PRESSURE: 140 MMHG | BODY MASS INDEX: 29.79 KG/M2 | HEART RATE: 88 BPM | WEIGHT: 181 LBS | RESPIRATION RATE: 18 BRPM

## 2021-12-06 DIAGNOSIS — E11.65 CONTROLLED TYPE 2 DIABETES MELLITUS WITH HYPERGLYCEMIA, WITH LONG-TERM CURRENT USE OF INSULIN (HCC): Primary | ICD-10-CM

## 2021-12-06 DIAGNOSIS — Z79.4 CONTROLLED TYPE 2 DIABETES MELLITUS WITH HYPERGLYCEMIA, WITH LONG-TERM CURRENT USE OF INSULIN (HCC): Primary | ICD-10-CM

## 2021-12-06 DIAGNOSIS — I10 ESSENTIAL HYPERTENSION: ICD-10-CM

## 2021-12-06 DIAGNOSIS — Z79.4 TYPE 2 DIABETES MELLITUS WITHOUT COMPLICATION, WITH LONG-TERM CURRENT USE OF INSULIN (HCC): ICD-10-CM

## 2021-12-06 DIAGNOSIS — E11.9 TYPE 2 DIABETES MELLITUS WITHOUT COMPLICATION, WITH LONG-TERM CURRENT USE OF INSULIN (HCC): ICD-10-CM

## 2021-12-06 DIAGNOSIS — E78.2 MIXED HYPERLIPIDEMIA: ICD-10-CM

## 2021-12-06 PROCEDURE — 3008F BODY MASS INDEX DOCD: CPT | Performed by: FAMILY MEDICINE

## 2021-12-06 PROCEDURE — 3079F DIAST BP 80-89 MM HG: CPT | Performed by: FAMILY MEDICINE

## 2021-12-06 PROCEDURE — 99214 OFFICE O/P EST MOD 30 MIN: CPT | Performed by: FAMILY MEDICINE

## 2021-12-06 PROCEDURE — 3077F SYST BP >= 140 MM HG: CPT | Performed by: FAMILY MEDICINE

## 2021-12-06 RX ORDER — POTASSIUM CITRATE 10 MEQ/1
20 TABLET, EXTENDED RELEASE ORAL 2 TIMES DAILY
Qty: 180 TABLET | Refills: 3 | Status: SHIPPED | OUTPATIENT
Start: 2021-12-06

## 2021-12-06 RX ORDER — METOPROLOL SUCCINATE 50 MG/1
25 TABLET, EXTENDED RELEASE ORAL DAILY
Qty: 90 TABLET | Refills: 1 | Status: SHIPPED | OUTPATIENT
Start: 2021-12-06

## 2021-12-06 RX ORDER — INSULIN ASPART 100 [IU]/ML
10 INJECTION, SOLUTION INTRAVENOUS; SUBCUTANEOUS
Qty: 30 ML | Refills: 3 | Status: SHIPPED | OUTPATIENT
Start: 2021-12-06

## 2021-12-06 NOTE — PROGRESS NOTES
Tiffani Powell has an appointment with Sierra Christy today,12/6/2021. Results printed to give to her.

## 2021-12-07 NOTE — PROGRESS NOTES
had concerns including Diabetes (2 months follow up ). Otis Thrasher is a 35year old female who presents for recheck of her diabetes. Subjective: Worse A1c, worse BP  Last A1c value was 8.9% done 12/4/2021.      Diabetes Care Dilated Ey Posterior tibial pulses are 2+ on the right side and 2+ on the left side. Heart sounds: Normal heart sounds. No murmur heard. Pulmonary:      Effort: Pulmonary effort is normal. No respiratory distress.       Breath sounds: Normal breath so 100 UNIT/ML Subcutaneous Solution    insulin detemir (LEVEMIR) 100 UNIT/ML Subcutaneous Solution    insulin aspart (NOVOLOG) 100 UNIT/ML Subcutaneous Solution    insulin detemir 100 UNIT/ML Subcutaneous Solution Pen-injector    Insulin Aspart Pen (NOVOLOG metFORMIN. I have also changed her potassium citrate, metoprolol succinate, and insulin aspart.  Additionally, I am having her maintain her NovoLOG FlexPen, allopurinol, losartan, amLODIPine, hydroCHLOROthiazide, ergocalciferol, Levemir, Synjardy, and atorv

## 2021-12-21 ENCOUNTER — TELEPHONE (OUTPATIENT)
Dept: FAMILY MEDICINE CLINIC | Facility: CLINIC | Age: 41
End: 2021-12-21

## 2021-12-21 RX ORDER — PEN NEEDLE, DIABETIC 29 G X1/2"
NEEDLE, DISPOSABLE MISCELLANEOUS
COMMUNITY
Start: 2021-12-17

## 2021-12-21 RX ORDER — METOPROLOL TARTRATE 50 MG/1
50 TABLET, FILM COATED ORAL DAILY
COMMUNITY
Start: 2021-12-08

## 2021-12-21 NOTE — TELEPHONE ENCOUNTER
Patient has many insulin types listed  Pens and vials  Please confirm and reconcile her medication list before I complete a piror Auth on Novolog 100 u/mL solution

## 2021-12-21 NOTE — TELEPHONE ENCOUNTER
Received fax from reji Ortiz, prior authorization required for Novolog 100 unit ML Solution    Key # [id number]    Called patient and explained process, fax placed in Pat's in box

## 2021-12-22 NOTE — TELEPHONE ENCOUNTER
Called and talked to patient she is takingi nsulin detemir (LEVEMIR) 100 UNIT/ML Subcutaneous Solution 50 units 2 times a day and insulin aspart (NOVOLOG) 100 UNIT/ML Subcutaneous Solution 10 units TID AC using vials she has to draw up the dosage.

## 2022-03-04 RX ORDER — INSULIN ASPART 100 [IU]/ML
10 INJECTION, SOLUTION INTRAVENOUS; SUBCUTANEOUS
Qty: 30 ML | Refills: 11 | Status: SHIPPED | OUTPATIENT
Start: 2022-03-04

## 2022-03-04 RX ORDER — FLUCONAZOLE 150 MG/1
150 TABLET ORAL
COMMUNITY
Start: 2022-01-29

## 2022-03-04 NOTE — TELEPHONE ENCOUNTER
Patient is calling requesting a refill on insulin detemir (LEVEMIR) 100 UNIT/ML Subcutaneous Solution sent to pharmacy below. Patient had appointment scheduled for Monday which she had to canceled and rescheduled to 3/14/2022.  Pt will be out of her insulin by Tuesday will not have enough to last until her next appointment     [facility] [address] [place], [place] - [address] AT [address] [place] [address] , [phone], [phone]

## 2022-03-12 ENCOUNTER — LAB ENCOUNTER (OUTPATIENT)
Dept: LAB | Facility: HOSPITAL | Age: 42
End: 2022-03-12
Attending: RADIOLOGY
Payer: COMMERCIAL

## 2022-03-12 DIAGNOSIS — E11.65 CONTROLLED TYPE 2 DIABETES MELLITUS WITH HYPERGLYCEMIA, WITH LONG-TERM CURRENT USE OF INSULIN (HCC): ICD-10-CM

## 2022-03-12 DIAGNOSIS — Z79.4 CONTROLLED TYPE 2 DIABETES MELLITUS WITH HYPERGLYCEMIA, WITH LONG-TERM CURRENT USE OF INSULIN (HCC): ICD-10-CM

## 2022-03-12 LAB
ALBUMIN SERPL-MCNC: 3.8 G/DL (ref 3.4–5)
ALBUMIN/GLOB SERPL: 0.9 {RATIO} (ref 1–2)
ALP LIVER SERPL-CCNC: 81 U/L
ALT SERPL-CCNC: 17 U/L
ANION GAP SERPL CALC-SCNC: 7 MMOL/L (ref 0–18)
AST SERPL-CCNC: 11 U/L (ref 15–37)
BILIRUB SERPL-MCNC: 0.3 MG/DL (ref 0.1–2)
BUN BLD-MCNC: 20 MG/DL (ref 7–18)
CALCIUM BLD-MCNC: 9.8 MG/DL (ref 8.5–10.1)
CHLORIDE SERPL-SCNC: 103 MMOL/L (ref 98–112)
CHOLEST SERPL-MCNC: 199 MG/DL (ref ?–200)
CO2 SERPL-SCNC: 28 MMOL/L (ref 21–32)
CREAT BLD-MCNC: 0.98 MG/DL
CREAT UR-SCNC: 44.9 MG/DL
EST. AVERAGE GLUCOSE BLD GHB EST-MCNC: 186 MG/DL (ref 68–126)
FASTING PATIENT LIPID ANSWER: YES
FASTING STATUS PATIENT QL REPORTED: YES
GLOBULIN PLAS-MCNC: 4.3 G/DL (ref 2.8–4.4)
GLUCOSE BLD-MCNC: 187 MG/DL (ref 70–99)
HBA1C MFR BLD: 8.1 % (ref ?–5.7)
HDLC SERPL-MCNC: 41 MG/DL (ref 40–59)
LDLC SERPL CALC-MCNC: 107 MG/DL (ref ?–100)
MICROALBUMIN UR-MCNC: 169 MG/DL
MICROALBUMIN/CREAT 24H UR-RTO: 3763.9 UG/MG (ref ?–30)
NONHDLC SERPL-MCNC: 158 MG/DL (ref ?–130)
OSMOLALITY SERPL CALC.SUM OF ELEC: 294 MOSM/KG (ref 275–295)
POTASSIUM SERPL-SCNC: 4.4 MMOL/L (ref 3.5–5.1)
PROT SERPL-MCNC: 8.1 G/DL (ref 6.4–8.2)
SODIUM SERPL-SCNC: 138 MMOL/L (ref 136–145)
TRIGL SERPL-MCNC: 296 MG/DL (ref 30–149)
VLDLC SERPL CALC-MCNC: 51 MG/DL (ref 0–30)

## 2022-03-12 PROCEDURE — 3052F HG A1C>EQUAL 8.0%<EQUAL 9.0%: CPT | Performed by: FAMILY MEDICINE

## 2022-03-12 PROCEDURE — 80053 COMPREHEN METABOLIC PANEL: CPT

## 2022-03-12 PROCEDURE — 83036 HEMOGLOBIN GLYCOSYLATED A1C: CPT

## 2022-03-12 PROCEDURE — 82570 ASSAY OF URINE CREATININE: CPT

## 2022-03-12 PROCEDURE — 3060F POS MICROALBUMINURIA REV: CPT | Performed by: FAMILY MEDICINE

## 2022-03-12 PROCEDURE — 36415 COLL VENOUS BLD VENIPUNCTURE: CPT

## 2022-03-12 PROCEDURE — 80061 LIPID PANEL: CPT

## 2022-03-12 PROCEDURE — 82043 UR ALBUMIN QUANTITATIVE: CPT

## 2022-03-12 NOTE — ASSESSMENT & PLAN NOTE
As for her Diabetes, it is well controlled, no significant medication side effects noted. Recommendations are: continue present meds, lose weight by increased dietary compliance and exercise and will check labs as ordered.     Lab Results   Component Value Date    A1C 8.9 (H) 12/04/2021    A1C 9.1 (A) 07/30/2021      Blood Sugar Medications          insulin aspart (NOVOLOG) 100 UNIT/ML Subcutaneous Solution    SYNJARDY 12.5-1000 MG Oral Tab    insulin detemir (LEVEMIR) 100 UNIT/ML Subcutaneous Solution

## 2022-03-12 NOTE — ASSESSMENT & PLAN NOTE
Stable, Continue present management.     Blood Pressure and Cardiac Medications          metoprolol tartrate 50 MG Oral Tab    metoprolol succinate 50 MG Oral Tablet 24 Hr    losartan 100 MG Oral Tab    amLODIPine 10 MG Oral Tab

## 2022-03-14 ENCOUNTER — OFFICE VISIT (OUTPATIENT)
Dept: FAMILY MEDICINE CLINIC | Facility: CLINIC | Age: 42
End: 2022-03-14
Payer: COMMERCIAL

## 2022-03-14 VITALS
BODY MASS INDEX: 29.79 KG/M2 | RESPIRATION RATE: 22 BRPM | DIASTOLIC BLOOD PRESSURE: 80 MMHG | HEART RATE: 88 BPM | SYSTOLIC BLOOD PRESSURE: 150 MMHG | WEIGHT: 181 LBS | HEIGHT: 65.5 IN

## 2022-03-14 DIAGNOSIS — E11.65 CONTROLLED TYPE 2 DIABETES MELLITUS WITH HYPERGLYCEMIA, WITH LONG-TERM CURRENT USE OF INSULIN (HCC): Primary | ICD-10-CM

## 2022-03-14 DIAGNOSIS — E78.2 MIXED HYPERLIPIDEMIA: ICD-10-CM

## 2022-03-14 DIAGNOSIS — Z79.4 CONTROLLED TYPE 2 DIABETES MELLITUS WITH HYPERGLYCEMIA, WITH LONG-TERM CURRENT USE OF INSULIN (HCC): Primary | ICD-10-CM

## 2022-03-14 DIAGNOSIS — I10 ESSENTIAL HYPERTENSION: ICD-10-CM

## 2022-03-14 PROCEDURE — 99214 OFFICE O/P EST MOD 30 MIN: CPT | Performed by: FAMILY MEDICINE

## 2022-03-14 PROCEDURE — 3008F BODY MASS INDEX DOCD: CPT | Performed by: FAMILY MEDICINE

## 2022-03-14 PROCEDURE — 3077F SYST BP >= 140 MM HG: CPT | Performed by: FAMILY MEDICINE

## 2022-03-14 PROCEDURE — 3079F DIAST BP 80-89 MM HG: CPT | Performed by: FAMILY MEDICINE

## 2022-03-14 RX ORDER — OLOPATADINE HYDROCHLORIDE 1 MG/ML
1 SOLUTION/ DROPS OPHTHALMIC 2 TIMES DAILY
Qty: 15 ML | Refills: 3 | Status: SHIPPED | OUTPATIENT
Start: 2022-03-14

## 2022-03-14 RX ORDER — SPIRONOLACTONE 25 MG/1
25 TABLET ORAL DAILY
Qty: 90 TABLET | Refills: 3 | Status: SHIPPED | OUTPATIENT
Start: 2022-03-14 | End: 2023-03-09

## 2022-03-14 RX ORDER — INSULIN DETEMIR 100 [IU]/ML
55 INJECTION, SOLUTION SUBCUTANEOUS EVERY 12 HOURS
Qty: 120 ML | Refills: 3 | Status: SHIPPED | OUTPATIENT
Start: 2022-03-14

## 2022-03-16 ENCOUNTER — TELEPHONE (OUTPATIENT)
Dept: FAMILY MEDICINE CLINIC | Facility: CLINIC | Age: 42
End: 2022-03-16

## 2022-03-16 NOTE — TELEPHONE ENCOUNTER
Received prior authorization from [employer] for Levemir 100unit/ml solution. LMOM to inform pt of PA process. Key: [id number]    Placed in Pat's inbox to begin process.

## 2022-03-17 NOTE — TELEPHONE ENCOUNTER
Received a response  Sorry but [employer] cannot process this PA request electronically. Please refer to the original instructions from the [facility] for information on how to process this prior authorization manually.

## 2022-03-28 NOTE — TELEPHONE ENCOUNTER
Received call from Priscilla Sena at [facility], she will call [facility] and approve the script

## 2022-04-23 ENCOUNTER — LAB ENCOUNTER (OUTPATIENT)
Dept: LAB | Facility: HOSPITAL | Age: 42
End: 2022-04-23
Attending: FAMILY MEDICINE
Payer: COMMERCIAL

## 2022-04-23 DIAGNOSIS — I10 ESSENTIAL HYPERTENSION: ICD-10-CM

## 2022-04-23 DIAGNOSIS — E11.65 CONTROLLED TYPE 2 DIABETES MELLITUS WITH HYPERGLYCEMIA, WITH LONG-TERM CURRENT USE OF INSULIN (HCC): ICD-10-CM

## 2022-04-23 DIAGNOSIS — Z79.4 CONTROLLED TYPE 2 DIABETES MELLITUS WITH HYPERGLYCEMIA, WITH LONG-TERM CURRENT USE OF INSULIN (HCC): ICD-10-CM

## 2022-04-23 LAB
ALBUMIN SERPL-MCNC: 3.6 G/DL (ref 3.4–5)
ALBUMIN/GLOB SERPL: 0.9 {RATIO} (ref 1–2)
ALP LIVER SERPL-CCNC: 74 U/L
ALT SERPL-CCNC: 20 U/L
ANION GAP SERPL CALC-SCNC: 6 MMOL/L (ref 0–18)
AST SERPL-CCNC: 15 U/L (ref 15–37)
BILIRUB SERPL-MCNC: 0.3 MG/DL (ref 0.1–2)
BUN BLD-MCNC: 21 MG/DL (ref 7–18)
CALCIUM BLD-MCNC: 9.3 MG/DL (ref 8.5–10.1)
CHLORIDE SERPL-SCNC: 105 MMOL/L (ref 98–112)
CO2 SERPL-SCNC: 26 MMOL/L (ref 21–32)
CREAT BLD-MCNC: 1.12 MG/DL
EST. AVERAGE GLUCOSE BLD GHB EST-MCNC: 192 MG/DL (ref 68–126)
FASTING STATUS PATIENT QL REPORTED: NO
GLOBULIN PLAS-MCNC: 4.1 G/DL (ref 2.8–4.4)
GLUCOSE BLD-MCNC: 221 MG/DL (ref 70–99)
HBA1C MFR BLD: 8.3 % (ref ?–5.7)
OSMOLALITY SERPL CALC.SUM OF ELEC: 294 MOSM/KG (ref 275–295)
POTASSIUM SERPL-SCNC: 4.9 MMOL/L (ref 3.5–5.1)
PROT SERPL-MCNC: 7.7 G/DL (ref 6.4–8.2)
SODIUM SERPL-SCNC: 137 MMOL/L (ref 136–145)

## 2022-04-23 PROCEDURE — 36415 COLL VENOUS BLD VENIPUNCTURE: CPT

## 2022-04-23 PROCEDURE — 80053 COMPREHEN METABOLIC PANEL: CPT

## 2022-04-23 PROCEDURE — 83036 HEMOGLOBIN GLYCOSYLATED A1C: CPT

## 2022-04-23 NOTE — ASSESSMENT & PLAN NOTE
As for her Diabetes, it is no significant medication side effects noted, borderline controlled. Recommendations are: continue present meds, lose weight by increased dietary compliance and exercise and will check labs as ordered.     Lab Results   Component Value Date    A1C 8.1 (H) 03/12/2022    A1C 8.9 (H) 12/04/2021      Blood Sugar Medications          insulin detemir (LEVEMIR) 100 UNIT/ML Subcutaneous Solution    insulin aspart (NOVOLOG) 100 UNIT/ML Subcutaneous Solution    SYNJARDY 12.5-1000 MG Oral Tab

## 2022-04-25 ENCOUNTER — OFFICE VISIT (OUTPATIENT)
Dept: FAMILY MEDICINE CLINIC | Facility: CLINIC | Age: 42
End: 2022-04-25
Payer: COMMERCIAL

## 2022-04-25 VITALS
SYSTOLIC BLOOD PRESSURE: 136 MMHG | HEART RATE: 82 BPM | WEIGHT: 183.38 LBS | HEIGHT: 65.5 IN | DIASTOLIC BLOOD PRESSURE: 80 MMHG | BODY MASS INDEX: 30.19 KG/M2 | RESPIRATION RATE: 18 BRPM

## 2022-04-25 DIAGNOSIS — E11.65 CONTROLLED TYPE 2 DIABETES MELLITUS WITH HYPERGLYCEMIA, WITH LONG-TERM CURRENT USE OF INSULIN (HCC): Primary | ICD-10-CM

## 2022-04-25 DIAGNOSIS — I10 ESSENTIAL HYPERTENSION: ICD-10-CM

## 2022-04-25 DIAGNOSIS — Z79.4 CONTROLLED TYPE 2 DIABETES MELLITUS WITH HYPERGLYCEMIA, WITH LONG-TERM CURRENT USE OF INSULIN (HCC): Primary | ICD-10-CM

## 2022-04-25 DIAGNOSIS — E78.2 MIXED HYPERLIPIDEMIA: ICD-10-CM

## 2022-04-25 RX ORDER — BLOOD SUGAR DIAGNOSTIC
STRIP MISCELLANEOUS
Qty: 100 EACH | Refills: 3 | Status: SHIPPED | OUTPATIENT
Start: 2022-04-25

## 2022-04-25 RX ORDER — INSULIN ASPART 100 [IU]/ML
12 INJECTION, SOLUTION INTRAVENOUS; SUBCUTANEOUS
Qty: 30 ML | Refills: 11 | Status: SHIPPED | OUTPATIENT
Start: 2022-04-25

## 2022-04-25 RX ORDER — INSULIN DETEMIR 100 [IU]/ML
60 INJECTION, SOLUTION SUBCUTANEOUS EVERY 12 HOURS
Qty: 120 ML | Refills: 3 | Status: SHIPPED | OUTPATIENT
Start: 2022-04-25

## 2022-04-29 ENCOUNTER — PATIENT OUTREACH (OUTPATIENT)
Dept: FAMILY MEDICINE CLINIC | Facility: CLINIC | Age: 42
End: 2022-04-29

## 2022-05-03 ENCOUNTER — TELEPHONE (OUTPATIENT)
Dept: FAMILY MEDICINE CLINIC | Facility: CLINIC | Age: 42
End: 2022-05-03

## 2022-05-03 NOTE — TELEPHONE ENCOUNTER
The form we received from [facility] states insulin Aspart does not require a PA, and this is exactly what we ordered  However [facility] is filling Brand Novolog. Returned faxed paper to [place] questioning why [place] is being filled when this is not covered by insurance.

## 2022-05-03 NOTE — TELEPHONE ENCOUNTER
PA received for insulin aspart 100 UNIT/ML Injection Solution (Novolog 100 unit/ml Solution)   Key [id number]  Last Name  Silvia JOSUE 10/15/1980   Placed in [place]

## 2022-06-21 NOTE — ASSESSMENT & PLAN NOTE
As for her Diabetes, it is well controlled, no significant medication side effects noted. Recommendations are: continue present meds, lose weight by increased dietary compliance and exercise and will check labs as ordered.     Lab Results   Component Value Date    A1C 8.3 (H) 04/23/2022    A1C 8.1 (H) 03/12/2022      Blood Sugar Medications          insulin aspart 100 UNIT/ML Injection Solution    insulin detemir (LEVEMIR) 100 UNIT/ML Subcutaneous Solution    SYNJARDY 12.5-1000 MG Oral Tab

## 2022-06-22 ENCOUNTER — OFFICE VISIT (OUTPATIENT)
Dept: FAMILY MEDICINE CLINIC | Facility: CLINIC | Age: 42
End: 2022-06-22
Payer: COMMERCIAL

## 2022-06-22 ENCOUNTER — LAB ENCOUNTER (OUTPATIENT)
Dept: LAB | Facility: HOSPITAL | Age: 42
End: 2022-06-22
Attending: FAMILY MEDICINE
Payer: COMMERCIAL

## 2022-06-22 VITALS
TEMPERATURE: 98 F | DIASTOLIC BLOOD PRESSURE: 80 MMHG | SYSTOLIC BLOOD PRESSURE: 136 MMHG | BODY MASS INDEX: 29.96 KG/M2 | HEIGHT: 65.5 IN | HEART RATE: 80 BPM | RESPIRATION RATE: 16 BRPM | WEIGHT: 182 LBS

## 2022-06-22 DIAGNOSIS — Z79.4 CONTROLLED TYPE 2 DIABETES MELLITUS WITH HYPERGLYCEMIA, WITH LONG-TERM CURRENT USE OF INSULIN (HCC): Primary | ICD-10-CM

## 2022-06-22 DIAGNOSIS — I10 ESSENTIAL HYPERTENSION: ICD-10-CM

## 2022-06-22 DIAGNOSIS — E11.65 CONTROLLED TYPE 2 DIABETES MELLITUS WITH HYPERGLYCEMIA, WITH LONG-TERM CURRENT USE OF INSULIN (HCC): Primary | ICD-10-CM

## 2022-06-22 DIAGNOSIS — E78.2 MIXED HYPERLIPIDEMIA: ICD-10-CM

## 2022-06-22 DIAGNOSIS — R30.0 DYSURIA: ICD-10-CM

## 2022-06-22 LAB
BILIRUB UR QL STRIP.AUTO: NEGATIVE
CARTRIDGE LOT#: ABNORMAL NUMERIC
CLARITY UR REFRACT.AUTO: CLEAR
COLOR UR AUTO: YELLOW
GLUCOSE UR STRIP.AUTO-MCNC: >=1000 MG/DL
HEMOGLOBIN A1C: 9.2 % (ref 4.3–5.6)
KETONES UR STRIP.AUTO-MCNC: NEGATIVE MG/DL
NITRITE UR QL STRIP.AUTO: POSITIVE
PH UR STRIP.AUTO: 6 [PH] (ref 5–8)
PROT UR STRIP.AUTO-MCNC: >=300 MG/DL
RBC #/AREA URNS AUTO: >10 /HPF
SP GR UR STRIP.AUTO: 1.01 (ref 1–1.03)
UROBILINOGEN UR STRIP.AUTO-MCNC: 0.2 MG/DL
WBC #/AREA URNS AUTO: >50 /HPF

## 2022-06-22 PROCEDURE — 99214 OFFICE O/P EST MOD 30 MIN: CPT | Performed by: FAMILY MEDICINE

## 2022-06-22 PROCEDURE — 87186 SC STD MICRODIL/AGAR DIL: CPT

## 2022-06-22 PROCEDURE — 3075F SYST BP GE 130 - 139MM HG: CPT | Performed by: FAMILY MEDICINE

## 2022-06-22 PROCEDURE — 87077 CULTURE AEROBIC IDENTIFY: CPT

## 2022-06-22 PROCEDURE — 3008F BODY MASS INDEX DOCD: CPT | Performed by: FAMILY MEDICINE

## 2022-06-22 PROCEDURE — 81001 URINALYSIS AUTO W/SCOPE: CPT

## 2022-06-22 PROCEDURE — 87086 URINE CULTURE/COLONY COUNT: CPT

## 2022-06-22 PROCEDURE — 83036 HEMOGLOBIN GLYCOSYLATED A1C: CPT | Performed by: FAMILY MEDICINE

## 2022-06-22 PROCEDURE — 3046F HEMOGLOBIN A1C LEVEL >9.0%: CPT | Performed by: FAMILY MEDICINE

## 2022-06-22 PROCEDURE — 3079F DIAST BP 80-89 MM HG: CPT | Performed by: FAMILY MEDICINE

## 2022-06-22 RX ORDER — CEFUROXIME AXETIL 250 MG/1
250 TABLET ORAL 2 TIMES DAILY
Qty: 14 TABLET | Refills: 0 | Status: SHIPPED | OUTPATIENT
Start: 2022-06-22 | End: 2022-06-29

## 2022-06-22 RX ORDER — HYDROCHLOROTHIAZIDE 25 MG/1
TABLET ORAL
COMMUNITY
Start: 2022-06-13

## 2022-06-24 ENCOUNTER — TELEPHONE (OUTPATIENT)
Dept: FAMILY MEDICINE CLINIC | Facility: CLINIC | Age: 42
End: 2022-06-24

## 2022-06-24 RX ORDER — NITROFURANTOIN 25; 75 MG/1; MG/1
100 CAPSULE ORAL 2 TIMES DAILY
Qty: 20 CAPSULE | Refills: 0 | Status: SHIPPED | OUTPATIENT
Start: 2022-06-24 | End: 2022-07-04

## 2022-06-24 NOTE — TELEPHONE ENCOUNTER
Called  and results and recommendations were reviewed with pt. Macrobid requires PA with her insurance. Will use [facility] at [facility]. She verbalized understanding and agrees with plan.

## 2022-06-24 NOTE — TELEPHONE ENCOUNTER
----- Message from Elham Flood MD sent at 6/24/2022 10:56 AM CDT -----  Just seen, on Ceftin, likely resistant. Will swithch to [facility] for 10 days    Script sent to pharmacy. Please notify.   9/21/2022  4:00 PM    Nay Rodríguez MD           EMG 3               EMG Celeste

## 2022-07-20 ENCOUNTER — TELEPHONE (OUTPATIENT)
Dept: FAMILY MEDICINE CLINIC | Facility: CLINIC | Age: 42
End: 2022-07-20

## 2022-07-20 DIAGNOSIS — E11.65 CONTROLLED TYPE 2 DIABETES MELLITUS WITH HYPERGLYCEMIA, WITH LONG-TERM CURRENT USE OF INSULIN (HCC): ICD-10-CM

## 2022-07-20 DIAGNOSIS — Z79.4 CONTROLLED TYPE 2 DIABETES MELLITUS WITH HYPERGLYCEMIA, WITH LONG-TERM CURRENT USE OF INSULIN (HCC): ICD-10-CM

## 2022-07-20 RX ORDER — INSULIN DETEMIR 100 [IU]/ML
65 INJECTION, SOLUTION SUBCUTANEOUS EVERY 12 HOURS
Qty: 120 ML | Refills: 3 | Status: SHIPPED | OUTPATIENT
Start: 2022-07-20

## 2022-07-20 NOTE — TELEPHONE ENCOUNTER
1. Controlled type 2 diabetes mellitus with hyperglycemia, with long-term current use of insulin (HCC)  Overview:  Levemir 50 BID and novolog 10 TID, A1c 8.6% 5/2020 at [facility]  Orders:  -     Levemir; Inject 65 Units into the skin every 12 (twelve) hours. Dispense: 120 mL; Refill: 3       OK to notify.  Thanks, Angeles Malhotra MD

## 2022-07-20 NOTE — TELEPHONE ENCOUNTER
Patient is calling states she is running out of her levemir insulin and pharmacy is not giving her a refill on her insulin. Pt was told by pharmacy to have our office contact them to let them know Dr. Marquez Levels has increase her Levemir to 65units.          [id number] [address] AT [address] [place] [address], [phone], [phone]

## 2022-08-23 DIAGNOSIS — Z79.4 CONTROLLED TYPE 2 DIABETES MELLITUS WITH HYPERGLYCEMIA, WITH LONG-TERM CURRENT USE OF INSULIN (HCC): ICD-10-CM

## 2022-08-23 DIAGNOSIS — E11.65 CONTROLLED TYPE 2 DIABETES MELLITUS WITH HYPERGLYCEMIA, WITH LONG-TERM CURRENT USE OF INSULIN (HCC): ICD-10-CM

## 2022-08-25 RX ORDER — INSULIN DETEMIR 100 [IU]/ML
INJECTION, SOLUTION SUBCUTANEOUS
Qty: 45 ML | Refills: 0 | OUTPATIENT
Start: 2022-08-25

## 2022-09-20 NOTE — ASSESSMENT & PLAN NOTE
Borderline control. Increase to 100 mg metoprolol.    Blood Pressure and Cardiac Medications          metoprolol succinate 50 MG Oral Tablet 24 Hr    losartan 100 MG Oral Tab    amLODIPine 10 MG Oral Tab

## 2022-09-20 NOTE — ASSESSMENT & PLAN NOTE
As for her Diabetes, it is well controlled, no significant medication side effects noted. Recommendations are: continue present meds, lose weight by increased dietary compliance and exercise and will check labs as ordered.     Lab Results   Component Value Date    A1C 9.2 (A) 06/22/2022    A1C 8.3 (H) 04/23/2022      Blood Sugar Medications          Insulin Glargine, 2 Unit Dial, (TOUJEO MAX SOLOSTAR) 300 UNIT/ML Subcutaneous Solution Pen-injector    Empagliflozin-metFORMIN HCl (SYNJARDY) 12.5-1000 MG Oral Tab    insulin aspart 100 UNIT/ML Injection Solution

## 2022-09-21 ENCOUNTER — OFFICE VISIT (OUTPATIENT)
Dept: FAMILY MEDICINE CLINIC | Facility: CLINIC | Age: 42
End: 2022-09-21

## 2022-09-21 VITALS
RESPIRATION RATE: 20 BRPM | BODY MASS INDEX: 29.5 KG/M2 | WEIGHT: 179.19 LBS | HEIGHT: 65.5 IN | SYSTOLIC BLOOD PRESSURE: 150 MMHG | DIASTOLIC BLOOD PRESSURE: 80 MMHG | HEART RATE: 86 BPM

## 2022-09-21 DIAGNOSIS — E78.2 MIXED HYPERLIPIDEMIA: ICD-10-CM

## 2022-09-21 DIAGNOSIS — I10 ESSENTIAL HYPERTENSION: ICD-10-CM

## 2022-09-21 DIAGNOSIS — Z79.4 CONTROLLED TYPE 2 DIABETES MELLITUS WITH HYPERGLYCEMIA, WITH LONG-TERM CURRENT USE OF INSULIN (HCC): Primary | ICD-10-CM

## 2022-09-21 DIAGNOSIS — E11.65 CONTROLLED TYPE 2 DIABETES MELLITUS WITH HYPERGLYCEMIA, WITH LONG-TERM CURRENT USE OF INSULIN (HCC): Primary | ICD-10-CM

## 2022-09-21 DIAGNOSIS — Z79.4 TYPE 2 DIABETES MELLITUS WITHOUT COMPLICATION, WITH LONG-TERM CURRENT USE OF INSULIN (HCC): ICD-10-CM

## 2022-09-21 DIAGNOSIS — E11.9 TYPE 2 DIABETES MELLITUS WITHOUT COMPLICATION, WITH LONG-TERM CURRENT USE OF INSULIN (HCC): ICD-10-CM

## 2022-09-21 LAB
CARTRIDGE LOT#: 978 NUMERIC
HEMOGLOBIN A1C: 9.1 % (ref 4.3–5.6)

## 2022-09-21 PROCEDURE — 3046F HEMOGLOBIN A1C LEVEL >9.0%: CPT | Performed by: FAMILY MEDICINE

## 2022-09-21 PROCEDURE — 3079F DIAST BP 80-89 MM HG: CPT | Performed by: FAMILY MEDICINE

## 2022-09-21 PROCEDURE — 83036 HEMOGLOBIN GLYCOSYLATED A1C: CPT | Performed by: FAMILY MEDICINE

## 2022-09-21 PROCEDURE — 3008F BODY MASS INDEX DOCD: CPT | Performed by: FAMILY MEDICINE

## 2022-09-21 PROCEDURE — 3077F SYST BP >= 140 MM HG: CPT | Performed by: FAMILY MEDICINE

## 2022-09-21 PROCEDURE — 99214 OFFICE O/P EST MOD 30 MIN: CPT | Performed by: FAMILY MEDICINE

## 2022-09-21 RX ORDER — LOSARTAN POTASSIUM 100 MG/1
100 TABLET ORAL DAILY
Qty: 90 TABLET | Refills: 3 | Status: SHIPPED | OUTPATIENT
Start: 2022-09-21

## 2022-09-21 RX ORDER — SPIRONOLACTONE 25 MG/1
25 TABLET ORAL DAILY
Qty: 90 TABLET | Refills: 3 | Status: SHIPPED | OUTPATIENT
Start: 2022-09-21 | End: 2023-09-16

## 2022-09-21 RX ORDER — METOPROLOL SUCCINATE 100 MG/1
100 TABLET, EXTENDED RELEASE ORAL DAILY
Qty: 90 TABLET | Refills: 3 | Status: SHIPPED | OUTPATIENT
Start: 2022-09-21

## 2022-09-21 RX ORDER — AMLODIPINE BESYLATE 10 MG/1
10 TABLET ORAL DAILY
Qty: 90 TABLET | Refills: 3 | Status: SHIPPED | OUTPATIENT
Start: 2022-09-21

## 2022-09-21 RX ORDER — HYDROCHLOROTHIAZIDE 25 MG/1
25 TABLET ORAL DAILY
Qty: 90 TABLET | Refills: 3 | Status: SHIPPED | OUTPATIENT
Start: 2022-09-21

## 2022-09-21 RX ORDER — ATORVASTATIN CALCIUM 10 MG/1
10 TABLET, FILM COATED ORAL DAILY
Qty: 90 TABLET | Refills: 3 | Status: SHIPPED | OUTPATIENT
Start: 2022-09-21

## 2022-09-21 RX ORDER — INSULIN GLARGINE 300 U/ML
70 INJECTION, SOLUTION SUBCUTANEOUS EVERY 12 HOURS
Qty: 10 EACH | Refills: 0 | Status: SHIPPED | OUTPATIENT
Start: 2022-09-21

## 2022-09-21 RX ORDER — METOPROLOL SUCCINATE 50 MG/1
25 TABLET, EXTENDED RELEASE ORAL DAILY
Qty: 90 TABLET | Refills: 3 | Status: SHIPPED | OUTPATIENT
Start: 2022-09-21 | End: 2022-09-21

## 2022-09-21 RX ORDER — EMPAGLIFLOZIN AND METFORMIN HYDROCHLORIDE 12.5; 1 MG/1; MG/1
1 TABLET ORAL 2 TIMES DAILY
Qty: 180 TABLET | Refills: 3 | Status: SHIPPED | OUTPATIENT
Start: 2022-09-21

## 2022-09-21 RX ORDER — BLOOD SUGAR DIAGNOSTIC
STRIP MISCELLANEOUS
Qty: 100 EACH | Refills: 3 | Status: SHIPPED | OUTPATIENT
Start: 2022-09-21

## 2022-09-22 ENCOUNTER — TELEPHONE (OUTPATIENT)
Dept: FAMILY MEDICINE CLINIC | Facility: CLINIC | Age: 42
End: 2022-09-22

## 2022-09-22 DIAGNOSIS — E11.65 INADEQUATELY CONTROLLED DIABETES MELLITUS (HCC): Primary | ICD-10-CM

## 2022-09-22 NOTE — TELEPHONE ENCOUNTER
Received PA from pharmacy for Synjardy 12.5 - 1000mg tablets    Key: [id number]  Last name: Vega  : 10/15/1980    LM to inform pt of process. Form faxed to [facility].

## 2022-09-23 NOTE — TELEPHONE ENCOUNTER
Completed additional form and submitted to [employer] through [address] to [phone]

## 2022-10-10 NOTE — TELEPHONE ENCOUNTER
Can we refer her to the Diabetes Mellitus APN at [facility]?  Thanks and stay well, Genet Che MD

## 2022-10-12 NOTE — TELEPHONE ENCOUNTER
Referral request JIE Landaverde    Diagnosis: E11.65    Six visits      Office notes from Dr. Sarai Waltre M.D. 9/21/22  Pharmacy only giving 60 iunits of Levemir. Last A1c value was 9.1% done 9/21/2022.

## 2022-10-23 ENCOUNTER — LAB ENCOUNTER (OUTPATIENT)
Dept: LAB | Facility: HOSPITAL | Age: 42
End: 2022-10-23
Attending: FAMILY MEDICINE
Payer: COMMERCIAL

## 2022-10-23 DIAGNOSIS — Z79.4 CONTROLLED TYPE 2 DIABETES MELLITUS WITH HYPERGLYCEMIA, WITH LONG-TERM CURRENT USE OF INSULIN (HCC): ICD-10-CM

## 2022-10-23 DIAGNOSIS — E11.65 CONTROLLED TYPE 2 DIABETES MELLITUS WITH HYPERGLYCEMIA, WITH LONG-TERM CURRENT USE OF INSULIN (HCC): ICD-10-CM

## 2022-10-23 LAB
ALBUMIN SERPL-MCNC: 3.3 G/DL (ref 3.4–5)
ALBUMIN/GLOB SERPL: 0.7 {RATIO} (ref 1–2)
ALP LIVER SERPL-CCNC: 68 U/L
ALT SERPL-CCNC: 17 U/L
ANION GAP SERPL CALC-SCNC: 5 MMOL/L (ref 0–18)
AST SERPL-CCNC: 6 U/L (ref 15–37)
BILIRUB SERPL-MCNC: 0.5 MG/DL (ref 0.1–2)
BUN BLD-MCNC: 23 MG/DL (ref 7–18)
CALCIUM BLD-MCNC: 8.8 MG/DL (ref 8.5–10.1)
CHLORIDE SERPL-SCNC: 112 MMOL/L (ref 98–112)
CHOLEST SERPL-MCNC: 193 MG/DL (ref ?–200)
CO2 SERPL-SCNC: 23 MMOL/L (ref 21–32)
CREAT BLD-MCNC: 1.05 MG/DL
EST. AVERAGE GLUCOSE BLD GHB EST-MCNC: 206 MG/DL (ref 68–126)
FASTING PATIENT LIPID ANSWER: YES
FASTING STATUS PATIENT QL REPORTED: YES
GFR SERPLBLD BASED ON 1.73 SQ M-ARVRAT: 67 ML/MIN/1.73M2 (ref 60–?)
GLOBULIN PLAS-MCNC: 4.5 G/DL (ref 2.8–4.4)
GLUCOSE BLD-MCNC: 171 MG/DL (ref 70–99)
HBA1C MFR BLD: 8.8 % (ref ?–5.7)
HDLC SERPL-MCNC: 39 MG/DL (ref 40–59)
LDLC SERPL CALC-MCNC: 108 MG/DL (ref ?–100)
NONHDLC SERPL-MCNC: 154 MG/DL (ref ?–130)
OSMOLALITY SERPL CALC.SUM OF ELEC: 298 MOSM/KG (ref 275–295)
POTASSIUM SERPL-SCNC: 4.5 MMOL/L (ref 3.5–5.1)
PROT SERPL-MCNC: 7.8 G/DL (ref 6.4–8.2)
SODIUM SERPL-SCNC: 140 MMOL/L (ref 136–145)
TRIGL SERPL-MCNC: 269 MG/DL (ref 30–149)
VLDLC SERPL CALC-MCNC: 46 MG/DL (ref 0–30)

## 2022-10-23 PROCEDURE — 36415 COLL VENOUS BLD VENIPUNCTURE: CPT

## 2022-10-23 PROCEDURE — 80061 LIPID PANEL: CPT

## 2022-10-23 PROCEDURE — 80053 COMPREHEN METABOLIC PANEL: CPT

## 2022-10-23 PROCEDURE — 83036 HEMOGLOBIN GLYCOSYLATED A1C: CPT

## 2022-10-27 ENCOUNTER — TELEPHONE (OUTPATIENT)
Dept: FAMILY MEDICINE CLINIC | Facility: CLINIC | Age: 42
End: 2022-10-27

## 2022-10-27 DIAGNOSIS — Z79.4 CONTROLLED TYPE 2 DIABETES MELLITUS WITH HYPERGLYCEMIA, WITH LONG-TERM CURRENT USE OF INSULIN (HCC): Primary | ICD-10-CM

## 2022-10-27 DIAGNOSIS — E11.65 CONTROLLED TYPE 2 DIABETES MELLITUS WITH HYPERGLYCEMIA, WITH LONG-TERM CURRENT USE OF INSULIN (HCC): Primary | ICD-10-CM

## 2022-10-28 RX ORDER — REPAGLINIDE 0.5 MG/1
0.5 TABLET ORAL
Qty: 90 TABLET | Refills: 2 | Status: SHIPPED | OUTPATIENT
Start: 2022-10-28

## 2022-10-28 NOTE — TELEPHONE ENCOUNTER
She is seeing Diabetes Mellitus nurse, not the APN. I added prandin with meals    1. Controlled type 2 diabetes mellitus with hyperglycemia, with long-term current use of insulin ([facility]) (Primary)  Overview:  Toujeo 70 BID  and novolog 10 TID, empagliflozin metformin 12.5/1000 BID. A1c 8.6% 5/2020 at [facility]  Orders:  -     Repaglinide; Take 1 tablet (0.5 mg total) by mouth in the morning and 1 tablet (0.5 mg total) at noon and 1 tablet (0.5 mg total) in the evening. Take before meals. Dispense: 90 tablet;  Refill: 2  -     OP REFERRAL PROVIDER VISIT-DIABETES

## 2022-10-28 NOTE — TELEPHONE ENCOUNTER
Called mónica using interperter [id number] discussed with the patient and she has already received this message and has appointment with Diabetes educator she is asking about oral hypoglycemic tablet Dr Jenna Abreu was to send in to her.  Will ask Dr Camilla Hanna about this

## 2022-11-07 ENCOUNTER — DIABETIC EDUCATION (OUTPATIENT)
Dept: ENDOCRINOLOGY CLINIC | Facility: CLINIC | Age: 42
End: 2022-11-07
Payer: COMMERCIAL

## 2022-11-07 DIAGNOSIS — Z79.4 TYPE 2 DIABETES MELLITUS WITH HYPERGLYCEMIA, WITH LONG-TERM CURRENT USE OF INSULIN (HCC): Primary | ICD-10-CM

## 2022-11-07 DIAGNOSIS — E11.65 TYPE 2 DIABETES MELLITUS WITH HYPERGLYCEMIA, WITH LONG-TERM CURRENT USE OF INSULIN (HCC): Primary | ICD-10-CM

## 2022-11-07 NOTE — PROGRESS NOTES
Shree Nicholson   10/15/1980 was seen for Medical Nutrition Therapy with Diabetes:    11/7/2022  Referring Provider: Dr. Nidhi Blake  Start time: 3;30 End time: 4:30    Used the video language line for : Noel Chavez #[id number]. Here with  Elsie Sloan. HEMOGLOBIN A1C (%)   Date Value   09/21/2022 9.1 (A)     HgbA1C (%)   Date Value   10/23/2022 8.8 (H)     She changed jobs 6 months ago. Previously was active at work - FBS's were 120, 130 and , 170. Now sedentary job, BG's 200-300+ and no energy to exercise. B (6:45am): coffee, oatmeal with blueberries. No fruit juice. L (10am): pork or chicken meat with corn tortilla (4 or 5), very little rice and beans. Fruit  grapes, kiwi, cherries. Beverage: Oatmeal water or hibiscus water or lemon water. D (4/5pm): similar to lunch. No salad or vegetables. HS: maybe fruit    Beverages: no regular soda or fruit juice. Very little milk. Sweets - 2-3 times/week with coffee. Exercise: nothing regular. She has no energy currently. Reviewed HgbA1C and target A1c levels. Reviewed target BG levels FBS  and pp <150 ideally. She tests BID: mornings and at night HS. Reviewed patient's fasting values: 240, 230, 148  Post-prandial values: 310, 330, 340    Reviewed current diabetes medications: Toujeo 70 units @5:20am   Novolog 12 units TID before meals - after meals. Instructed her to start taking it 15-20 minutes prior to meals. She says she is consistent about taking it TID. Synjardy 12.5-1,000 mg BID - these are not at the pharmacy  Prandin 0.5 mg TID -  these are not at the pharmacy    Reviewed Type 2 diabetes as a diagnosis. Discussed insulin resistance and tools to treat: diet, regular physical activity, diabetes medications, and stress management. Taught label reading: focus on counting grams of carb rather than looking at sugar. Discussed Rule of 15 for treating hypoglycemia.     Discussed macronutrient balance: reduce starch, include lean protein and non-starchy vegetables, also fruit, yogurt and milk using food models. Reviewed principles for heart healthy diet: reduced saturated fat, reduced sodium and high fiber. Exercise: Discussed benefits of regular physical activity and goal to complete 30 minutes daily. Patient set diabetes self-management goals: 1) take Novolog 15-20 minutes BEFORE meals, 2) add protein to breakfast, 3) see diabetes provider, 4) exercise if you have the energy. Patient is willing to make lifestyle changes to improve blood glucose control. Written materials provided for all topics covered - In Iranian. Patient verbalized understanding and has no further questions at this time. Thank you for the referral.  Follow-up plan: scheduled her to see diabetes provider 11/17. Patient to contact diabetes center for questions/concerns.   Jose Bermudez MS, RD, ÁNGELA, ABEN

## 2022-11-17 ENCOUNTER — OFFICE VISIT (OUTPATIENT)
Dept: ENDOCRINOLOGY CLINIC | Facility: CLINIC | Age: 42
End: 2022-11-17
Payer: COMMERCIAL

## 2022-11-17 VITALS
DIASTOLIC BLOOD PRESSURE: 80 MMHG | SYSTOLIC BLOOD PRESSURE: 140 MMHG | RESPIRATION RATE: 18 BRPM | BODY MASS INDEX: 31 KG/M2 | WEIGHT: 188 LBS | HEART RATE: 76 BPM | OXYGEN SATURATION: 99 %

## 2022-11-17 DIAGNOSIS — E11.65 TYPE 2 DIABETES MELLITUS WITH HYPERGLYCEMIA, WITH LONG-TERM CURRENT USE OF INSULIN (HCC): Primary | ICD-10-CM

## 2022-11-17 DIAGNOSIS — Z79.4 TYPE 2 DIABETES MELLITUS WITH HYPERGLYCEMIA, WITH LONG-TERM CURRENT USE OF INSULIN (HCC): Primary | ICD-10-CM

## 2022-11-17 PROCEDURE — 3079F DIAST BP 80-89 MM HG: CPT | Performed by: NURSE PRACTITIONER

## 2022-11-17 PROCEDURE — 3077F SYST BP >= 140 MM HG: CPT | Performed by: NURSE PRACTITIONER

## 2022-11-17 PROCEDURE — 99215 OFFICE O/P EST HI 40 MIN: CPT | Performed by: NURSE PRACTITIONER

## 2022-11-17 RX ORDER — PEN NEEDLE, DIABETIC 30 GX3/16"
1 NEEDLE, DISPOSABLE MISCELLANEOUS DAILY
Qty: 100 EACH | Refills: 2 | Status: SHIPPED | OUTPATIENT
Start: 2022-11-17

## 2022-11-17 RX ORDER — PEN NEEDLE, DIABETIC 30 GX3/16"
1 NEEDLE, DISPOSABLE MISCELLANEOUS 4 TIMES DAILY
Qty: 100 EACH | Refills: 2 | Status: SHIPPED | OUTPATIENT
Start: 2022-11-17 | End: 2022-11-17

## 2022-11-17 RX ORDER — INSULIN DEGLUDEC 200 U/ML
INJECTION, SOLUTION SUBCUTANEOUS
Qty: 18 ML | Refills: 2 | Status: SHIPPED | OUTPATIENT
Start: 2022-11-17 | End: 2022-11-17

## 2022-11-17 RX ORDER — METFORMIN HYDROCHLORIDE 500 MG/1
1000 TABLET, EXTENDED RELEASE ORAL 2 TIMES DAILY WITH MEALS
Qty: 360 TABLET | Refills: 1 | Status: SHIPPED | OUTPATIENT
Start: 2022-11-17

## 2022-11-17 RX ORDER — EMPAGLIFLOZIN 25 MG/1
25 TABLET, FILM COATED ORAL DAILY
Qty: 21 TABLET | Refills: 0 | Status: SHIPPED | OUTPATIENT
Start: 2022-11-17

## 2022-11-22 ENCOUNTER — TELEPHONE (OUTPATIENT)
Dept: ENDOCRINOLOGY CLINIC | Facility: CLINIC | Age: 42
End: 2022-11-22

## 2022-11-22 NOTE — TELEPHONE ENCOUNTER
Received PA for [facility] U200. Pt is taking toujeo max, not tresiba. Left a message for pharmacy that this script is not appropriate and please cancel.

## 2022-12-09 ENCOUNTER — OFFICE VISIT (OUTPATIENT)
Dept: ENDOCRINOLOGY CLINIC | Facility: CLINIC | Age: 42
End: 2022-12-09
Payer: COMMERCIAL

## 2022-12-09 VITALS
HEART RATE: 72 BPM | DIASTOLIC BLOOD PRESSURE: 80 MMHG | OXYGEN SATURATION: 96 % | WEIGHT: 188.88 LBS | BODY MASS INDEX: 31 KG/M2 | RESPIRATION RATE: 18 BRPM | SYSTOLIC BLOOD PRESSURE: 122 MMHG

## 2022-12-09 DIAGNOSIS — Z79.4 TYPE 2 DIABETES MELLITUS WITH HYPERGLYCEMIA, WITH LONG-TERM CURRENT USE OF INSULIN (HCC): Primary | ICD-10-CM

## 2022-12-09 DIAGNOSIS — E11.65 TYPE 2 DIABETES MELLITUS WITH HYPERGLYCEMIA, WITH LONG-TERM CURRENT USE OF INSULIN (HCC): Primary | ICD-10-CM

## 2022-12-09 PROCEDURE — 99214 OFFICE O/P EST MOD 30 MIN: CPT | Performed by: NURSE PRACTITIONER

## 2022-12-09 PROCEDURE — 3074F SYST BP LT 130 MM HG: CPT | Performed by: NURSE PRACTITIONER

## 2022-12-09 PROCEDURE — 3079F DIAST BP 80-89 MM HG: CPT | Performed by: NURSE PRACTITIONER

## 2022-12-09 RX ORDER — INSULIN ASPART 100 [IU]/ML
16 INJECTION, SOLUTION INTRAVENOUS; SUBCUTANEOUS
Qty: 30 ML | Refills: 1 | Status: SHIPPED | OUTPATIENT
Start: 2022-12-09

## 2022-12-09 RX ORDER — INSULIN GLARGINE 100 [IU]/ML
20 INJECTION, SOLUTION SUBCUTANEOUS NIGHTLY
Qty: 15 ML | Refills: 1 | Status: SHIPPED | OUTPATIENT
Start: 2022-12-09

## 2022-12-09 RX ORDER — ROSUVASTATIN CALCIUM 20 MG/1
20 TABLET, COATED ORAL NIGHTLY
Qty: 90 TABLET | Refills: 1 | Status: SHIPPED | OUTPATIENT
Start: 2022-12-09

## 2022-12-19 RX ORDER — INSULIN DETEMIR 100 [IU]/ML
60 INJECTION, SOLUTION SUBCUTANEOUS EVERY 12 HOURS
COMMUNITY
Start: 2022-10-25 | End: 2022-12-21

## 2022-12-20 NOTE — ASSESSMENT & PLAN NOTE
Stable, Continue present management.     Blood Pressure and Cardiac Medications          amLODIPine 10 MG Oral Tab    losartan 100 MG Oral Tab    metoprolol succinate  MG Oral Tablet 24 Hr

## 2022-12-20 NOTE — ASSESSMENT & PLAN NOTE
As for her Diabetes, it is well controlled, no significant medication side effects noted. Recommendations are: continue present meds, lose weight by increased dietary compliance and exercise and will check labs as ordered.     Lab Results   Component Value Date    A1C 8.8 (H) 10/23/2022    A1C 9.1 (A) 09/21/2022      Blood Sugar Medications          LEVEMIR 100 UNIT/ML Subcutaneous Solution    insulin aspart 100 Units/mL Injection Solution    metFORMIN  MG Oral Tablet 24 Hr    Repaglinide 0.5 MG Oral Tab

## 2022-12-20 NOTE — ASSESSMENT & PLAN NOTE
Stable, Continue present management.     Cholesterol Lowering Medications          rosuvastatin 20 MG Oral Tab

## 2022-12-21 ENCOUNTER — OFFICE VISIT (OUTPATIENT)
Dept: FAMILY MEDICINE CLINIC | Facility: CLINIC | Age: 42
End: 2022-12-21
Payer: COMMERCIAL

## 2022-12-21 VITALS
HEART RATE: 80 BPM | DIASTOLIC BLOOD PRESSURE: 84 MMHG | HEIGHT: 65 IN | WEIGHT: 188 LBS | RESPIRATION RATE: 20 BRPM | SYSTOLIC BLOOD PRESSURE: 138 MMHG | TEMPERATURE: 97 F | BODY MASS INDEX: 31.32 KG/M2

## 2022-12-21 DIAGNOSIS — E11.65 CONTROLLED TYPE 2 DIABETES MELLITUS WITH HYPERGLYCEMIA, WITH LONG-TERM CURRENT USE OF INSULIN (HCC): Primary | ICD-10-CM

## 2022-12-21 DIAGNOSIS — I10 ESSENTIAL HYPERTENSION: ICD-10-CM

## 2022-12-21 DIAGNOSIS — E78.2 MIXED HYPERLIPIDEMIA: ICD-10-CM

## 2022-12-21 DIAGNOSIS — Z79.4 CONTROLLED TYPE 2 DIABETES MELLITUS WITH HYPERGLYCEMIA, WITH LONG-TERM CURRENT USE OF INSULIN (HCC): Primary | ICD-10-CM

## 2022-12-21 LAB
CARTRIDGE LOT#: ABNORMAL NUMERIC
HEMOGLOBIN A1C: 8.7 % (ref 4.3–5.6)

## 2022-12-21 PROCEDURE — 90471 IMMUNIZATION ADMIN: CPT | Performed by: FAMILY MEDICINE

## 2022-12-21 PROCEDURE — 3079F DIAST BP 80-89 MM HG: CPT | Performed by: FAMILY MEDICINE

## 2022-12-21 PROCEDURE — 90686 IIV4 VACC NO PRSV 0.5 ML IM: CPT | Performed by: FAMILY MEDICINE

## 2022-12-21 PROCEDURE — 99214 OFFICE O/P EST MOD 30 MIN: CPT | Performed by: FAMILY MEDICINE

## 2022-12-21 PROCEDURE — 83036 HEMOGLOBIN GLYCOSYLATED A1C: CPT | Performed by: FAMILY MEDICINE

## 2022-12-21 PROCEDURE — 3052F HG A1C>EQUAL 8.0%<EQUAL 9.0%: CPT | Performed by: FAMILY MEDICINE

## 2022-12-21 PROCEDURE — 3075F SYST BP GE 130 - 139MM HG: CPT | Performed by: FAMILY MEDICINE

## 2022-12-21 PROCEDURE — 3008F BODY MASS INDEX DOCD: CPT | Performed by: FAMILY MEDICINE

## 2022-12-21 RX ORDER — ATORVASTATIN CALCIUM 10 MG/1
TABLET, FILM COATED ORAL
COMMUNITY
Start: 2022-12-17 | End: 2022-12-21 | Stop reason: ALTCHOICE

## 2022-12-21 RX ORDER — AMLODIPINE BESYLATE 10 MG/1
TABLET ORAL
Qty: 90 TABLET | Refills: 3 | Status: SHIPPED | OUTPATIENT
Start: 2022-12-21

## 2022-12-21 RX ORDER — LOSARTAN POTASSIUM 100 MG/1
TABLET ORAL
Qty: 90 TABLET | Refills: 3 | Status: SHIPPED | OUTPATIENT
Start: 2022-12-21

## 2023-01-06 ENCOUNTER — OFFICE VISIT (OUTPATIENT)
Dept: ENDOCRINOLOGY CLINIC | Facility: CLINIC | Age: 43
End: 2023-01-06
Payer: COMMERCIAL

## 2023-01-06 VITALS
DIASTOLIC BLOOD PRESSURE: 90 MMHG | HEART RATE: 89 BPM | RESPIRATION RATE: 18 BRPM | OXYGEN SATURATION: 97 % | WEIGHT: 190.63 LBS | SYSTOLIC BLOOD PRESSURE: 150 MMHG | BODY MASS INDEX: 32 KG/M2

## 2023-01-06 DIAGNOSIS — Z79.4 TYPE 2 DIABETES MELLITUS WITH HYPERGLYCEMIA, WITH LONG-TERM CURRENT USE OF INSULIN (HCC): Primary | ICD-10-CM

## 2023-01-06 DIAGNOSIS — E11.65 TYPE 2 DIABETES MELLITUS WITH HYPERGLYCEMIA, WITH LONG-TERM CURRENT USE OF INSULIN (HCC): Primary | ICD-10-CM

## 2023-01-06 PROCEDURE — 95250 CONT GLUC MNTR PHYS/QHP EQP: CPT | Performed by: NURSE PRACTITIONER

## 2023-01-06 PROCEDURE — 3080F DIAST BP >= 90 MM HG: CPT | Performed by: NURSE PRACTITIONER

## 2023-01-06 PROCEDURE — 99214 OFFICE O/P EST MOD 30 MIN: CPT | Performed by: NURSE PRACTITIONER

## 2023-01-06 PROCEDURE — 3077F SYST BP >= 140 MM HG: CPT | Performed by: NURSE PRACTITIONER

## 2023-01-06 RX ORDER — PIOGLITAZONEHYDROCHLORIDE 30 MG/1
30 TABLET ORAL DAILY
Qty: 90 TABLET | Refills: 1 | Status: SHIPPED | OUTPATIENT
Start: 2023-01-06

## 2023-01-06 NOTE — PROGRESS NOTES
A continuous glucose sensor for 24 hour blood sugar monitoring was placed on patient today per APN order. Serial NUMBER: [id number]  Exp date: (2/28/23)  - After cleansing skin with alcohol prep, Sensor (FRANCISCO PRO )was inserted on  LEFT Posterior upper arm area without difficulty. Small amount of skin prep was added around sensor tape after placement to help with sensor adhesive. Area remains free of any bleeding or irritation or pain at site when patient left DM center. Patient instructions:   Record daily food/drink intake and activity in log book  Call Diabetes center with any questions or concerns.    instructed to record food, exercise, insulin doses (if taking) on log provided

## 2023-01-19 ENCOUNTER — OFFICE VISIT (OUTPATIENT)
Dept: ENDOCRINOLOGY CLINIC | Facility: CLINIC | Age: 43
End: 2023-01-19
Payer: COMMERCIAL

## 2023-01-19 VITALS
WEIGHT: 190.19 LBS | BODY MASS INDEX: 32 KG/M2 | HEART RATE: 78 BPM | RESPIRATION RATE: 18 BRPM | SYSTOLIC BLOOD PRESSURE: 122 MMHG | OXYGEN SATURATION: 97 % | DIASTOLIC BLOOD PRESSURE: 80 MMHG

## 2023-01-19 DIAGNOSIS — E11.65 TYPE 2 DIABETES MELLITUS WITH HYPERGLYCEMIA, WITH LONG-TERM CURRENT USE OF INSULIN (HCC): Primary | ICD-10-CM

## 2023-01-19 DIAGNOSIS — Z79.4 TYPE 2 DIABETES MELLITUS WITH HYPERGLYCEMIA, WITH LONG-TERM CURRENT USE OF INSULIN (HCC): Primary | ICD-10-CM

## 2023-01-19 PROCEDURE — 99214 OFFICE O/P EST MOD 30 MIN: CPT | Performed by: NURSE PRACTITIONER

## 2023-01-19 PROCEDURE — 95251 CONT GLUC MNTR ANALYSIS I&R: CPT | Performed by: NURSE PRACTITIONER

## 2023-01-19 PROCEDURE — 3074F SYST BP LT 130 MM HG: CPT | Performed by: NURSE PRACTITIONER

## 2023-01-19 PROCEDURE — 3079F DIAST BP 80-89 MM HG: CPT | Performed by: NURSE PRACTITIONER

## 2023-01-20 DIAGNOSIS — Z79.4 TYPE 2 DIABETES MELLITUS WITH HYPERGLYCEMIA, WITH LONG-TERM CURRENT USE OF INSULIN (HCC): ICD-10-CM

## 2023-01-20 DIAGNOSIS — E11.65 TYPE 2 DIABETES MELLITUS WITH HYPERGLYCEMIA, WITH LONG-TERM CURRENT USE OF INSULIN (HCC): ICD-10-CM

## 2023-01-23 ENCOUNTER — TELEPHONE (OUTPATIENT)
Dept: ENDOCRINOLOGY CLINIC | Facility: CLINIC | Age: 43
End: 2023-01-23

## 2023-01-23 RX ORDER — INSULIN ASPART 100 [IU]/ML
INJECTION, SOLUTION INTRAVENOUS; SUBCUTANEOUS
Qty: 30 ML | Refills: 1 | Status: SHIPPED | OUTPATIENT
Start: 2023-01-23

## 2023-01-23 NOTE — TELEPHONE ENCOUNTER
Received fax from [employer]'s that novolog is not covered. Per Zafar Proctor looks like plan exclusion. Contacted pharmacy. Insulin aspart is covered and can be picked up at pharmacy.

## 2023-01-31 ENCOUNTER — TELEPHONE (OUTPATIENT)
Dept: ENDOCRINOLOGY CLINIC | Facility: CLINIC | Age: 43
End: 2023-01-31

## 2023-01-31 NOTE — TELEPHONE ENCOUNTER
Called pt back pt states she did start the Actos 30mg daily she started it a month ago and states she is still feeling symptoms in her feet at first pt stated she was getting cramps on her feet but then mentioned it more like a poking sensation. She also has been getting symptoms in her hand of like a swelling and when she tries to bend her fingers its a tight feeling she says, pt did cut the rosuvastatin in half and does not feel a difference she does think it is the Actos 30mg. She also verbalized she understood the changes in [address] and will start that as well.

## 2023-01-31 NOTE — TELEPHONE ENCOUNTER
Attempted to reach patient, needs Albanian speaker. Will ask Aura Mares to call or use language line if she is unavailable.

## 2023-01-31 NOTE — TELEPHONE ENCOUNTER
KR asked for us to call pt to see Glucose trends. Pt states with the medication change she is still high through the day and the morning fasting depending what she's had the night before. Asked pt to let me know a range from the morning to the night of BG. Pt states her fasting BG she checks it and it come to around 170-200 and she'll recheck at [address] time since that is the only time she eats she says. It will be around 148-300 reviewed Diabetes med pt is still taking     Metforming 2 tabs daily with meals   Basaglar 30 units   Novolog Sliding scales but states she injects mainly 20 units at [address] time. Before pt goes to bed BG is still aiming at 200-300.  Pt does not check BG through out the day (breakfast/lunch)

## 2023-01-31 NOTE — TELEPHONE ENCOUNTER
Thank you, pt should also be taking actos 30 mg daily, did she start this med? If not please fill. Also please instruct her to increase Basaglar to 34 units daily and remind to take novolog before each meal 3x daily.

## 2023-02-01 NOTE — TELEPHONE ENCOUNTER
Contacted pt back with new information pt was informed verbalized she understood.  Pt will start the 15mg tomorrow since she already took her 30mg

## 2023-02-01 NOTE — TELEPHONE ENCOUNTER
Please have pt break actos in half and take 15 mg daily - if swelling continues over the next week discontinue medication and we can adjust insulin dosing

## 2023-02-12 ENCOUNTER — LAB ENCOUNTER (OUTPATIENT)
Dept: LAB | Facility: HOSPITAL | Age: 43
End: 2023-02-12
Attending: FAMILY MEDICINE
Payer: COMMERCIAL

## 2023-02-12 DIAGNOSIS — E11.65 CONTROLLED TYPE 2 DIABETES MELLITUS WITH HYPERGLYCEMIA, WITH LONG-TERM CURRENT USE OF INSULIN (HCC): ICD-10-CM

## 2023-02-12 DIAGNOSIS — Z79.4 CONTROLLED TYPE 2 DIABETES MELLITUS WITH HYPERGLYCEMIA, WITH LONG-TERM CURRENT USE OF INSULIN (HCC): ICD-10-CM

## 2023-02-12 LAB
ALBUMIN SERPL-MCNC: 3.6 G/DL (ref 3.4–5)
ALBUMIN/GLOB SERPL: 0.8 {RATIO} (ref 1–2)
ALP LIVER SERPL-CCNC: 64 U/L
ALT SERPL-CCNC: 25 U/L
ANION GAP SERPL CALC-SCNC: 5 MMOL/L (ref 0–18)
AST SERPL-CCNC: 15 U/L (ref 15–37)
BASOPHILS # BLD AUTO: 0.05 X10(3) UL (ref 0–0.2)
BASOPHILS NFR BLD AUTO: 0.4 %
BILIRUB SERPL-MCNC: 0.3 MG/DL (ref 0.1–2)
BUN BLD-MCNC: 21 MG/DL (ref 7–18)
CALCIUM BLD-MCNC: 9 MG/DL (ref 8.5–10.1)
CHLORIDE SERPL-SCNC: 105 MMOL/L (ref 98–112)
CHOLEST SERPL-MCNC: 199 MG/DL (ref ?–200)
CO2 SERPL-SCNC: 26 MMOL/L (ref 21–32)
CREAT BLD-MCNC: 1.07 MG/DL
CREAT UR-SCNC: 100 MG/DL
EOSINOPHIL # BLD AUTO: 0.37 X10(3) UL (ref 0–0.7)
EOSINOPHIL NFR BLD AUTO: 3.3 %
ERYTHROCYTE [DISTWIDTH] IN BLOOD BY AUTOMATED COUNT: 16.4 %
EST. AVERAGE GLUCOSE BLD GHB EST-MCNC: 206 MG/DL (ref 68–126)
FASTING PATIENT LIPID ANSWER: YES
FASTING STATUS PATIENT QL REPORTED: YES
GFR SERPLBLD BASED ON 1.73 SQ M-ARVRAT: 66 ML/MIN/1.73M2 (ref 60–?)
GLOBULIN PLAS-MCNC: 4.3 G/DL (ref 2.8–4.4)
GLUCOSE BLD-MCNC: 153 MG/DL (ref 70–99)
HBA1C MFR BLD: 8.8 % (ref ?–5.7)
HCT VFR BLD AUTO: 40.4 %
HDLC SERPL-MCNC: 39 MG/DL (ref 40–59)
HGB BLD-MCNC: 13 G/DL
IMM GRANULOCYTES # BLD AUTO: 0.11 X10(3) UL (ref 0–1)
IMM GRANULOCYTES NFR BLD: 1 %
LDLC SERPL CALC-MCNC: 103 MG/DL (ref ?–100)
LYMPHOCYTES # BLD AUTO: 2.65 X10(3) UL (ref 1–4)
LYMPHOCYTES NFR BLD AUTO: 23.8 %
MCH RBC QN AUTO: 28.6 PG (ref 26–34)
MCHC RBC AUTO-ENTMCNC: 32.2 G/DL (ref 31–37)
MCV RBC AUTO: 89 FL
MICROALBUMIN UR-MCNC: 163 MG/DL
MICROALBUMIN/CREAT 24H UR-RTO: 1630 UG/MG (ref ?–30)
MONOCYTES # BLD AUTO: 0.94 X10(3) UL (ref 0.1–1)
MONOCYTES NFR BLD AUTO: 8.5 %
NEUTROPHILS # BLD AUTO: 7 X10 (3) UL (ref 1.5–7.7)
NEUTROPHILS # BLD AUTO: 7 X10(3) UL (ref 1.5–7.7)
NEUTROPHILS NFR BLD AUTO: 63 %
NONHDLC SERPL-MCNC: 160 MG/DL (ref ?–130)
OSMOLALITY SERPL CALC.SUM OF ELEC: 288 MOSM/KG (ref 275–295)
PLATELET # BLD AUTO: 333 10(3)UL (ref 150–450)
POTASSIUM SERPL-SCNC: 3.9 MMOL/L (ref 3.5–5.1)
PROT SERPL-MCNC: 7.9 G/DL (ref 6.4–8.2)
RBC # BLD AUTO: 4.54 X10(6)UL
SODIUM SERPL-SCNC: 136 MMOL/L (ref 136–145)
TRIGL SERPL-MCNC: 338 MG/DL (ref 30–149)
VLDLC SERPL CALC-MCNC: 58 MG/DL (ref 0–30)
WBC # BLD AUTO: 11.1 X10(3) UL (ref 4–11)

## 2023-02-12 PROCEDURE — 82043 UR ALBUMIN QUANTITATIVE: CPT

## 2023-02-12 PROCEDURE — 3060F POS MICROALBUMINURIA REV: CPT | Performed by: FAMILY MEDICINE

## 2023-02-12 PROCEDURE — 36415 COLL VENOUS BLD VENIPUNCTURE: CPT

## 2023-02-12 PROCEDURE — 80061 LIPID PANEL: CPT

## 2023-02-12 PROCEDURE — 3052F HG A1C>EQUAL 8.0%<EQUAL 9.0%: CPT | Performed by: FAMILY MEDICINE

## 2023-02-12 PROCEDURE — 83036 HEMOGLOBIN GLYCOSYLATED A1C: CPT

## 2023-02-12 PROCEDURE — 80053 COMPREHEN METABOLIC PANEL: CPT

## 2023-02-12 PROCEDURE — 85025 COMPLETE CBC W/AUTO DIFF WBC: CPT

## 2023-02-12 PROCEDURE — 82570 ASSAY OF URINE CREATININE: CPT

## 2023-02-17 ENCOUNTER — APPOINTMENT (OUTPATIENT)
Dept: ENDOCRINOLOGY CLINIC | Facility: CLINIC | Age: 43
End: 2023-02-17

## 2023-02-21 ENCOUNTER — TELEPHONE (OUTPATIENT)
Dept: ENDOCRINOLOGY CLINIC | Facility: CLINIC | Age: 43
End: 2023-02-21

## 2023-02-21 NOTE — TELEPHONE ENCOUNTER
Pt is out of insulin by the end of this week. Inquired at her nurse visit last week of what she can do for more insulin.

## 2023-02-21 NOTE — TELEPHONE ENCOUNTER
Printed lidia $35 voucher so pt can purchase basaglar and humalog for $35 each monthly ($70 total). If pt can afford this please find out which pharmacy she would like rx sent and I will send them. In the meantime she can  lidia voucher and some samples in office.

## 2023-02-21 NOTE — ASSESSMENT & PLAN NOTE
As for her Diabetes, it is well controlled, no significant medication side effects noted. Recommendations are: continue present meds, lose weight by increased dietary compliance and exercise and will check labs as ordered.     Lab Results   Component Value Date    A1C 8.8 (H) 02/12/2023    A1C 8.7 12/21/2022      Blood Sugar Medications          INSULIN ASPART 100 Units/mL Injection Solution    pioglitazone 30 MG Oral Tab    metFORMIN  MG Oral Tablet 24 Hr

## 2023-02-21 NOTE — ASSESSMENT & PLAN NOTE
Stable, Continue present management.     Blood Pressure and Cardiac Medications          LOSARTAN 100 MG Oral Tab    AMLODIPINE 10 MG Oral Tab    metoprolol succinate  MG Oral Tablet 24 Hr

## 2023-02-22 ENCOUNTER — OFFICE VISIT (OUTPATIENT)
Dept: FAMILY MEDICINE CLINIC | Facility: CLINIC | Age: 43
End: 2023-02-22
Payer: COMMERCIAL

## 2023-02-22 VITALS
SYSTOLIC BLOOD PRESSURE: 120 MMHG | HEIGHT: 65 IN | DIASTOLIC BLOOD PRESSURE: 80 MMHG | WEIGHT: 191 LBS | BODY MASS INDEX: 31.82 KG/M2 | RESPIRATION RATE: 16 BRPM | HEART RATE: 80 BPM

## 2023-02-22 DIAGNOSIS — E11.65 TYPE 2 DIABETES MELLITUS WITH HYPERGLYCEMIA, WITH LONG-TERM CURRENT USE OF INSULIN (HCC): ICD-10-CM

## 2023-02-22 DIAGNOSIS — Z79.4 TYPE 2 DIABETES MELLITUS WITH HYPERGLYCEMIA, WITH LONG-TERM CURRENT USE OF INSULIN (HCC): ICD-10-CM

## 2023-02-22 DIAGNOSIS — I10 ESSENTIAL HYPERTENSION: ICD-10-CM

## 2023-02-22 DIAGNOSIS — Z79.4 CONTROLLED TYPE 2 DIABETES MELLITUS WITH HYPERGLYCEMIA, WITH LONG-TERM CURRENT USE OF INSULIN (HCC): Primary | ICD-10-CM

## 2023-02-22 DIAGNOSIS — E78.2 MIXED HYPERLIPIDEMIA: ICD-10-CM

## 2023-02-22 DIAGNOSIS — E11.65 CONTROLLED TYPE 2 DIABETES MELLITUS WITH HYPERGLYCEMIA, WITH LONG-TERM CURRENT USE OF INSULIN (HCC): Primary | ICD-10-CM

## 2023-02-22 DIAGNOSIS — Z12.31 VISIT FOR SCREENING MAMMOGRAM: ICD-10-CM

## 2023-02-22 PROCEDURE — 3079F DIAST BP 80-89 MM HG: CPT | Performed by: FAMILY MEDICINE

## 2023-02-22 PROCEDURE — 3074F SYST BP LT 130 MM HG: CPT | Performed by: FAMILY MEDICINE

## 2023-02-22 PROCEDURE — 3008F BODY MASS INDEX DOCD: CPT | Performed by: FAMILY MEDICINE

## 2023-02-22 PROCEDURE — 99214 OFFICE O/P EST MOD 30 MIN: CPT | Performed by: FAMILY MEDICINE

## 2023-02-22 RX ORDER — REPAGLINIDE 0.5 MG/1
TABLET ORAL
COMMUNITY
Start: 2023-01-24

## 2023-02-22 RX ORDER — ATORVASTATIN CALCIUM 10 MG/1
TABLET, FILM COATED ORAL
COMMUNITY
Start: 2023-01-21

## 2023-02-22 RX ORDER — INSULIN GLARGINE AND LIXISENATIDE 100; 33 U/ML; UG/ML
15 INJECTION, SOLUTION SUBCUTANEOUS DAILY
Qty: 6 ML | Refills: 3 | Status: SHIPPED | OUTPATIENT
Start: 2023-02-22

## 2023-02-22 RX ORDER — INSULIN ASPART 100 [IU]/ML
16 INJECTION, SOLUTION INTRAVENOUS; SUBCUTANEOUS
Qty: 30 ML | Refills: 1 | Status: SHIPPED | OUTPATIENT
Start: 2023-02-22

## 2023-02-24 NOTE — TELEPHONE ENCOUNTER
Contacted pt to see if this has been resolved. Pt speaks little English, her sister was there. Verbal permission to speak with sister. Per sister, pharmacy has not been notified them the script is ready. Script sent by Dr Mel Khan' office on 02/22/2023. Contacted pharmacy, waited on hold for over 20 minutes. Called pt back. They will go to [facility] and see what the issue is. I will patient Monday with language line and get update.

## 2023-02-27 ENCOUNTER — NURSE ONLY (OUTPATIENT)
Dept: ENDOCRINOLOGY CLINIC | Facility: CLINIC | Age: 43
End: 2023-02-27
Payer: COMMERCIAL

## 2023-02-27 DIAGNOSIS — E11.65 TYPE 2 DIABETES MELLITUS WITH HYPERGLYCEMIA, WITH LONG-TERM CURRENT USE OF INSULIN (HCC): Primary | ICD-10-CM

## 2023-02-27 DIAGNOSIS — Z79.4 TYPE 2 DIABETES MELLITUS WITH HYPERGLYCEMIA, WITH LONG-TERM CURRENT USE OF INSULIN (HCC): Primary | ICD-10-CM

## 2023-02-27 NOTE — TELEPHONE ENCOUNTER
Pt came in to  samples and voucher. [place] 03/01/2023, so she should be able to fill insulin after samples are gone. Used language line.

## 2023-02-27 NOTE — TELEPHONE ENCOUNTER
Pt does not have true insurance - per my last note she was to  lidia voucher for $35 insulin and present this to the pharmacy for out of pocket payments. Ok for samples of Basaglar or Humalog (I believe there may be a box of pens of basaglar or tresiba u-100 ok for that also to give a whole box) and ok for lyumjev (3 pens) if no humalog samples.  Please verify with pt if moving fwd she can afford the $70 monthly for 2 insulins with lidia voucher - if not we will need another plan moving fwd

## 2023-02-27 NOTE — TELEPHONE ENCOUNTER
Call with patient via language line. She was unable to  insulin at pharmacy, stated it wasn't covered by her insurance. She will come to office today to  some samples. Are lidia vouchers being held for her somewhere in the office? BG yesterday am was 184 mg/dl. Last night 382 mg/ dl. She was still in bed when I called, so had not yet checked BG. Denies symptoms.

## 2023-02-28 RX ORDER — INSULIN LISPRO-AABC 100 [IU]/ML
INJECTION, SOLUTION SUBCUTANEOUS
Qty: 9 ML | Refills: 0 | COMMUNITY
Start: 2023-02-28 | End: 2023-03-03

## 2023-03-03 ENCOUNTER — OFFICE VISIT (OUTPATIENT)
Dept: ENDOCRINOLOGY CLINIC | Facility: CLINIC | Age: 43
End: 2023-03-03
Payer: COMMERCIAL

## 2023-03-03 VITALS
BODY MASS INDEX: 32 KG/M2 | RESPIRATION RATE: 18 BRPM | WEIGHT: 190.81 LBS | OXYGEN SATURATION: 95 % | DIASTOLIC BLOOD PRESSURE: 90 MMHG | HEART RATE: 81 BPM | SYSTOLIC BLOOD PRESSURE: 134 MMHG

## 2023-03-03 DIAGNOSIS — E11.65 TYPE 2 DIABETES MELLITUS WITH HYPERGLYCEMIA, WITH LONG-TERM CURRENT USE OF INSULIN (HCC): Primary | ICD-10-CM

## 2023-03-03 DIAGNOSIS — Z79.4 TYPE 2 DIABETES MELLITUS WITH HYPERGLYCEMIA, WITH LONG-TERM CURRENT USE OF INSULIN (HCC): Primary | ICD-10-CM

## 2023-03-03 PROCEDURE — 3075F SYST BP GE 130 - 139MM HG: CPT | Performed by: NURSE PRACTITIONER

## 2023-03-03 PROCEDURE — 99213 OFFICE O/P EST LOW 20 MIN: CPT | Performed by: NURSE PRACTITIONER

## 2023-03-03 PROCEDURE — 3080F DIAST BP >= 90 MM HG: CPT | Performed by: NURSE PRACTITIONER

## 2023-03-03 PROCEDURE — 95251 CONT GLUC MNTR ANALYSIS I&R: CPT | Performed by: NURSE PRACTITIONER

## 2023-03-03 RX ORDER — INSULIN GLARGINE 100 [IU]/ML
15 INJECTION, SOLUTION SUBCUTANEOUS NIGHTLY
Qty: 15 ML | Refills: 0 | Status: SHIPPED | OUTPATIENT
Start: 2023-03-03

## 2023-03-03 RX ORDER — INSULIN LISPRO 100 [IU]/ML
INJECTION, SOLUTION INTRAVENOUS; SUBCUTANEOUS
Qty: 15 ML | Refills: 2 | Status: SHIPPED | OUTPATIENT
Start: 2023-03-03

## 2023-03-13 DIAGNOSIS — I10 ESSENTIAL HYPERTENSION: ICD-10-CM

## 2023-03-14 DIAGNOSIS — E11.65 TYPE 2 DIABETES MELLITUS WITH HYPERGLYCEMIA, WITH LONG-TERM CURRENT USE OF INSULIN (HCC): ICD-10-CM

## 2023-03-14 DIAGNOSIS — Z79.4 TYPE 2 DIABETES MELLITUS WITH HYPERGLYCEMIA, WITH LONG-TERM CURRENT USE OF INSULIN (HCC): ICD-10-CM

## 2023-03-14 RX ORDER — METFORMIN HYDROCHLORIDE 500 MG/1
TABLET, EXTENDED RELEASE ORAL
Qty: 360 TABLET | Refills: 1 | Status: SHIPPED | OUTPATIENT
Start: 2023-03-14

## 2023-03-15 ENCOUNTER — TELEPHONE (OUTPATIENT)
Dept: ENDOCRINOLOGY CLINIC | Facility: CLINIC | Age: 43
End: 2023-03-15

## 2023-03-15 RX ORDER — INSULIN GLARGINE 100 [IU]/ML
INJECTION, SOLUTION SUBCUTANEOUS
Qty: 15 ML | Refills: 1 | Status: SHIPPED | OUTPATIENT
Start: 2023-03-15

## 2023-03-15 NOTE — TELEPHONE ENCOUNTER
Received fax from [employer] for PA . Called the number on the form and reached the [facility].. Initiated the PA over the phone and was told we would get an answer very soon.

## 2023-03-16 RX ORDER — SPIRONOLACTONE 25 MG/1
TABLET ORAL
Qty: 90 TABLET | Refills: 1 | Status: SHIPPED | OUTPATIENT
Start: 2023-03-16

## 2023-05-05 ENCOUNTER — OFFICE VISIT (OUTPATIENT)
Dept: ENDOCRINOLOGY CLINIC | Facility: CLINIC | Age: 43
End: 2023-05-05
Payer: MEDICAID

## 2023-05-05 VITALS
RESPIRATION RATE: 19 BRPM | HEART RATE: 87 BPM | SYSTOLIC BLOOD PRESSURE: 159 MMHG | DIASTOLIC BLOOD PRESSURE: 89 MMHG | WEIGHT: 193 LBS | HEIGHT: 65 IN | BODY MASS INDEX: 32.15 KG/M2 | OXYGEN SATURATION: 98 %

## 2023-05-05 DIAGNOSIS — Z79.4 TYPE 2 DIABETES MELLITUS WITH HYPERGLYCEMIA, WITH LONG-TERM CURRENT USE OF INSULIN (HCC): Primary | ICD-10-CM

## 2023-05-05 DIAGNOSIS — E11.65 TYPE 2 DIABETES MELLITUS WITH HYPERGLYCEMIA, WITH LONG-TERM CURRENT USE OF INSULIN (HCC): Primary | ICD-10-CM

## 2023-05-05 DIAGNOSIS — E11.29 PROTEINURIA DUE TO TYPE 2 DIABETES MELLITUS (HCC): ICD-10-CM

## 2023-05-05 DIAGNOSIS — R80.9 PROTEINURIA DUE TO TYPE 2 DIABETES MELLITUS (HCC): ICD-10-CM

## 2023-05-05 LAB
CARTRIDGE LOT#: ABNORMAL NUMERIC
HEMOGLOBIN A1C: 9.8 % (ref 4.3–5.6)

## 2023-05-05 PROCEDURE — 3077F SYST BP >= 140 MM HG: CPT | Performed by: NURSE PRACTITIONER

## 2023-05-05 PROCEDURE — 3079F DIAST BP 80-89 MM HG: CPT | Performed by: NURSE PRACTITIONER

## 2023-05-05 PROCEDURE — 3008F BODY MASS INDEX DOCD: CPT | Performed by: NURSE PRACTITIONER

## 2023-05-05 PROCEDURE — 99214 OFFICE O/P EST MOD 30 MIN: CPT | Performed by: NURSE PRACTITIONER

## 2023-05-05 PROCEDURE — 83036 HEMOGLOBIN GLYCOSYLATED A1C: CPT | Performed by: NURSE PRACTITIONER

## 2023-05-05 RX ORDER — DULAGLUTIDE 0.75 MG/.5ML
0.75 INJECTION, SOLUTION SUBCUTANEOUS WEEKLY
Qty: 2 ML | Refills: 2 | Status: SHIPPED | OUTPATIENT
Start: 2023-05-05

## 2023-05-05 RX ORDER — INSULIN GLARGINE 100 [IU]/ML
INJECTION, SOLUTION SUBCUTANEOUS
Qty: 30 ML | Refills: 2 | Status: SHIPPED | OUTPATIENT
Start: 2023-05-05

## 2023-05-05 RX ORDER — INSULIN LISPRO 100 [IU]/ML
INJECTION, SOLUTION INTRAVENOUS; SUBCUTANEOUS
Qty: 90 ML | Refills: 2 | Status: SHIPPED | OUTPATIENT
Start: 2023-05-05

## 2023-05-09 ENCOUNTER — TELEPHONE (OUTPATIENT)
Dept: ENDOCRINOLOGY CLINIC | Facility: CLINIC | Age: 43
End: 2023-05-09

## 2023-05-09 NOTE — TELEPHONE ENCOUNTER
Message sent to PCP regarding BP but have not heard back. Please call patient and refer to nephrology (Dr. America Acevedo) for continued HTN despite being on 4 agents as well as proteinuria.

## 2023-05-09 NOTE — TELEPHONE ENCOUNTER
Pt called back, requested Dominican, conferenced in language line. Dr Beto Jim phone number provided, pt agreed to call and schedule.

## 2023-05-18 RX ORDER — INSULIN GLARGINE AND LIXISENATIDE 100; 33 U/ML; UG/ML
15 INJECTION, SOLUTION SUBCUTANEOUS DAILY
COMMUNITY
Start: 2023-03-15 | End: 2023-05-22 | Stop reason: ALTCHOICE

## 2023-05-19 NOTE — ASSESSMENT & PLAN NOTE
A1c is 9.8 done 5/5/2023 which shows frequent hyperglycemia and poor control! Encouraged better dietary choices and portion control, and increased activity and med changes as listed. Diabetic medications include Dulaglutide (TRULICITY) 1.5 CU/4.7LG Subcutaneous Solution Pen-injector [[id number]], Insulin Degludec (TRESIBA FLEXTOUCH) 200 UNIT/ML Subcutaneous Solution Pen-injector [[id number]], Insulin Lispro, 1 Unit Dial, (HUMALOG KWIKPEN) 100 UNIT/ML Subcutaneous Solution Pen-injector [[id number]], METFORMIN  MG Oral Tablet 24 Hr [[id number]], SOLIQUA 100-33 UNT-MCG/ML Subcutaneous Solution Pen-injector [[id number]], empagliflozin (JARDIANCE) 10 MG Oral Tab [[id number]], insulin glargine (LANTUS SOLOSTAR) 100 UNIT/ML Subcutaneous Solution Pen-injector [[id number]].  poor cpomtp;

## 2023-05-19 NOTE — ASSESSMENT & PLAN NOTE
Cholesterol shows Good control. Cholesterol: 199, done on 2/12/2023. HDL Cholesterol: 39, done on 2/12/2023. TriGlycerides 338, done on 2/12/2023. LDL Cholesterol: 103, done on 2/12/2023. Cholesterol medications include atorvastatin 10 MG Oral Tab [[id number]].

## 2023-05-19 NOTE — ASSESSMENT & PLAN NOTE
BP shows poor control with last BP of 159/89. Lifestyle changes, diet, exercise and weight loss were counseled. Medication changes as listed. Last K was 3.9 done on 2/12/2023. Last Cr was 1.07 done on 2/12/2023. Hypertension meds include LOSARTAN 100 MG Oral Tab [[id number]], SPIRONOLACTONE 25 MG Oral Tab [[id number]], hydroCHLOROthiazide 25 MG Oral Tab [[id number]].   stable, continue present management

## 2023-05-22 ENCOUNTER — TELEPHONE (OUTPATIENT)
Dept: ENDOCRINOLOGY CLINIC | Facility: CLINIC | Age: 43
End: 2023-05-22

## 2023-05-22 ENCOUNTER — OFFICE VISIT (OUTPATIENT)
Dept: FAMILY MEDICINE CLINIC | Facility: CLINIC | Age: 43
End: 2023-05-22
Payer: MEDICAID

## 2023-05-22 VITALS
SYSTOLIC BLOOD PRESSURE: 136 MMHG | WEIGHT: 194.81 LBS | DIASTOLIC BLOOD PRESSURE: 86 MMHG | BODY MASS INDEX: 32.46 KG/M2 | HEIGHT: 65 IN | RESPIRATION RATE: 14 BRPM | HEART RATE: 70 BPM

## 2023-05-22 DIAGNOSIS — Z79.4 TYPE 2 DIABETES MELLITUS WITH HYPERGLYCEMIA, WITH LONG-TERM CURRENT USE OF INSULIN (HCC): ICD-10-CM

## 2023-05-22 DIAGNOSIS — Z79.4 CONTROLLED TYPE 2 DIABETES MELLITUS WITH HYPERGLYCEMIA, WITH LONG-TERM CURRENT USE OF INSULIN (HCC): Primary | ICD-10-CM

## 2023-05-22 DIAGNOSIS — E78.2 MIXED HYPERLIPIDEMIA: ICD-10-CM

## 2023-05-22 DIAGNOSIS — E11.65 TYPE 2 DIABETES MELLITUS WITH HYPERGLYCEMIA, WITH LONG-TERM CURRENT USE OF INSULIN (HCC): ICD-10-CM

## 2023-05-22 DIAGNOSIS — I10 ESSENTIAL HYPERTENSION: ICD-10-CM

## 2023-05-22 DIAGNOSIS — E11.65 CONTROLLED TYPE 2 DIABETES MELLITUS WITH HYPERGLYCEMIA, WITH LONG-TERM CURRENT USE OF INSULIN (HCC): Primary | ICD-10-CM

## 2023-05-22 RX ORDER — INSULIN GLARGINE 100 [IU]/ML
40 INJECTION, SOLUTION SUBCUTANEOUS 2 TIMES DAILY
Qty: 36 ML | Refills: 2 | Status: SHIPPED | OUTPATIENT
Start: 2023-05-22

## 2023-06-07 ENCOUNTER — TELEMEDICINE (OUTPATIENT)
Dept: ENDOCRINOLOGY CLINIC | Facility: CLINIC | Age: 43
End: 2023-06-07
Payer: MEDICAID

## 2023-06-07 DIAGNOSIS — E11.29 PROTEINURIA DUE TO TYPE 2 DIABETES MELLITUS (HCC): ICD-10-CM

## 2023-06-07 DIAGNOSIS — E11.65 TYPE 2 DIABETES MELLITUS WITH HYPERGLYCEMIA, WITH LONG-TERM CURRENT USE OF INSULIN (HCC): Primary | ICD-10-CM

## 2023-06-07 DIAGNOSIS — Z79.4 TYPE 2 DIABETES MELLITUS WITH HYPERGLYCEMIA, WITH LONG-TERM CURRENT USE OF INSULIN (HCC): Primary | ICD-10-CM

## 2023-06-07 DIAGNOSIS — R80.9 PROTEINURIA DUE TO TYPE 2 DIABETES MELLITUS (HCC): ICD-10-CM

## 2023-06-07 PROCEDURE — 99214 OFFICE O/P EST MOD 30 MIN: CPT | Performed by: NURSE PRACTITIONER

## 2023-06-07 RX ORDER — BLOOD-GLUCOSE METER
1 EACH MISCELLANEOUS 4 TIMES DAILY
Qty: 1 KIT | Refills: 0 | Status: SHIPPED | OUTPATIENT
Start: 2023-06-07 | End: 2024-06-06

## 2023-06-20 ENCOUNTER — TELEPHONE (OUTPATIENT)
Dept: ENDOCRINOLOGY CLINIC | Facility: CLINIC | Age: 43
End: 2023-06-20

## 2023-06-20 NOTE — TELEPHONE ENCOUNTER
Started PA through cover my meds.      Georginay Dell reyes (Geurrero: [id number]) - [id number]  Trulicity 2.6CV/8.9JD pen-injectors  Status: Sent To PlanCreated: June 14th, 2023 [phone]: June 20th, 2023

## 2023-06-21 NOTE — TELEPHONE ENCOUNTER
PA approved 6/20/23-6/20/24. Copy sent to scan. Called pharmacy to confirm medication is running thru properly. Pharmacy states no OOP cost for pt.

## 2023-07-24 ENCOUNTER — TELEPHONE (OUTPATIENT)
Dept: ENDOCRINOLOGY CLINIC | Facility: CLINIC | Age: 43
End: 2023-07-24

## 2023-07-24 RX ORDER — PEN NEEDLE, DIABETIC 32GX 5/32"
NEEDLE, DISPOSABLE MISCELLANEOUS
Qty: 400 EACH | Refills: 3 | Status: SHIPPED | OUTPATIENT
Start: 2023-07-24

## 2023-07-24 NOTE — TELEPHONE ENCOUNTER
Received PA for BD needles sent in preferred pen needles. Requested Prescriptions     Signed Prescriptions Disp Refills    Insulin Pen Needle (TRUEPLUS 5-BEVEL PEN NEEDLES) 32G X 4 MM Does not apply Misc 400 each 3     Sig: Use pen needles 4x daily.      Authorizing Provider: Bhavik Dee     Ordering User: Mamta Barahona     Future Appointments   Date Time Provider Alexys Lozano   8/11/2023  2:30 PM KT Mo [facility]   8/28/2023  4:30 PM Nemesio Saravia MD [facility]

## 2023-08-11 ENCOUNTER — OFFICE VISIT (OUTPATIENT)
Dept: ENDOCRINOLOGY CLINIC | Facility: CLINIC | Age: 43
End: 2023-08-11
Payer: MEDICAID

## 2023-08-11 VITALS
SYSTOLIC BLOOD PRESSURE: 136 MMHG | RESPIRATION RATE: 20 BRPM | HEART RATE: 81 BPM | BODY MASS INDEX: 32 KG/M2 | DIASTOLIC BLOOD PRESSURE: 82 MMHG | WEIGHT: 191.19 LBS | OXYGEN SATURATION: 98 %

## 2023-08-11 DIAGNOSIS — E11.65 TYPE 2 DIABETES MELLITUS WITH HYPERGLYCEMIA, WITH LONG-TERM CURRENT USE OF INSULIN (HCC): Primary | ICD-10-CM

## 2023-08-11 DIAGNOSIS — Z79.4 TYPE 2 DIABETES MELLITUS WITH HYPERGLYCEMIA, WITH LONG-TERM CURRENT USE OF INSULIN (HCC): Primary | ICD-10-CM

## 2023-08-11 LAB
CARTRIDGE LOT#: 603 NUMERIC
HEMOGLOBIN A1C: 7.5 % (ref 4.3–5.6)

## 2023-08-11 PROCEDURE — 83036 HEMOGLOBIN GLYCOSYLATED A1C: CPT | Performed by: NURSE PRACTITIONER

## 2023-08-11 PROCEDURE — 3079F DIAST BP 80-89 MM HG: CPT | Performed by: NURSE PRACTITIONER

## 2023-08-11 PROCEDURE — 99214 OFFICE O/P EST MOD 30 MIN: CPT | Performed by: NURSE PRACTITIONER

## 2023-08-11 PROCEDURE — 3075F SYST BP GE 130 - 139MM HG: CPT | Performed by: NURSE PRACTITIONER

## 2023-08-11 PROCEDURE — 3051F HG A1C>EQUAL 7.0%<8.0%: CPT | Performed by: FAMILY MEDICINE

## 2023-08-11 RX ORDER — METFORMIN HYDROCHLORIDE 500 MG/1
1000 TABLET, EXTENDED RELEASE ORAL 2 TIMES DAILY WITH MEALS
Qty: 360 TABLET | Refills: 1 | Status: SHIPPED | OUTPATIENT
Start: 2023-08-11

## 2023-08-11 RX ORDER — DULAGLUTIDE 1.5 MG/.5ML
1.5 INJECTION, SOLUTION SUBCUTANEOUS WEEKLY
Qty: 2 ML | Refills: 2 | Status: SHIPPED | OUTPATIENT
Start: 2023-08-11

## 2023-08-11 RX ORDER — INSULIN LISPRO 100 [IU]/ML
INJECTION, SOLUTION INTRAVENOUS; SUBCUTANEOUS
Qty: 90 ML | Refills: 2 | Status: SHIPPED | OUTPATIENT
Start: 2023-08-11

## 2023-08-11 RX ORDER — INSULIN GLARGINE 100 [IU]/ML
40 INJECTION, SOLUTION SUBCUTANEOUS 2 TIMES DAILY
Qty: 36 ML | Refills: 2 | Status: SHIPPED | OUTPATIENT
Start: 2023-08-11

## 2023-08-24 ENCOUNTER — HOSPITAL ENCOUNTER (OUTPATIENT)
Dept: MAMMOGRAPHY | Facility: HOSPITAL | Age: 43
Discharge: HOME | End: 2023-08-24
Attending: FAMILY MEDICINE
Payer: MEDICAID

## 2023-08-24 DIAGNOSIS — Z12.31 VISIT FOR SCREENING MAMMOGRAM: ICD-10-CM

## 2023-08-24 PROCEDURE — 77067 SCR MAMMO BI INCL CAD: CPT | Performed by: FAMILY MEDICINE

## 2023-08-24 PROCEDURE — 77063 BREAST TOMOSYNTHESIS BI: CPT | Performed by: FAMILY MEDICINE

## 2023-08-26 NOTE — ASSESSMENT & PLAN NOTE
A1c is 7.5 done 8/11/2023 which shows hyperglycemia and borderline control, maintain vigilance and increase activity and medications as listed. Diabetic medications include Dulaglutide (TRULICITY) 1.5 SE/6.5RY Subcutaneous Solution Pen-injector [[id number]], Insulin Lispro, 1 Unit Dial, (HUMALOG KWIKPEN) 100 UNIT/ML Subcutaneous Solution Pen-injector [[id number]], empagliflozin (JARDIANCE) 10 MG Oral Tab [[id number]], insulin glargine (LANTUS SOLOSTAR) 100 UNIT/ML Subcutaneous Solution Pen-injector [[id number]], metFORMIN  MG Oral Tablet 24 Hr [[id number]].

## 2023-08-26 NOTE — ASSESSMENT & PLAN NOTE
Cholesterol shows Good control. Long term heart-healthy diet and lifestyle discussed and encouraged to reduce risk of cardiovascular disease. Cholesterol: 199, done on 2/12/2023. HDL Cholesterol: 39, done on 2/12/2023. TriGlycerides 338, done on 2/12/2023. LDL Cholesterol: 103, done on 2/12/2023. No current Cholesterol medication.

## 2023-08-26 NOTE — ASSESSMENT & PLAN NOTE
BP shows borderline control with last BP of 136/82. Work on lifestyle changes, diet, exercise and weight management. Last K was 3.9 done on 2/12/2023. Last Cr was 1.07 done on 2/12/2023. Hypertension meds include LOSARTAN 100 MG Oral Tab [[id number]], SPIRONOLACTONE 25 MG Oral Tab [[id number]], hydroCHLOROthiazide 25 MG Oral Tab [[id number]].     c

## 2023-08-28 ENCOUNTER — OFFICE VISIT (OUTPATIENT)
Dept: FAMILY MEDICINE CLINIC | Facility: CLINIC | Age: 43
End: 2023-08-28
Payer: MEDICAID

## 2023-08-28 VITALS
BODY MASS INDEX: 31.96 KG/M2 | HEART RATE: 88 BPM | RESPIRATION RATE: 16 BRPM | HEIGHT: 65 IN | DIASTOLIC BLOOD PRESSURE: 88 MMHG | WEIGHT: 191.81 LBS | SYSTOLIC BLOOD PRESSURE: 138 MMHG

## 2023-08-28 DIAGNOSIS — I10 ESSENTIAL HYPERTENSION: ICD-10-CM

## 2023-08-28 DIAGNOSIS — Z79.4 CONTROLLED TYPE 2 DIABETES MELLITUS WITH HYPERGLYCEMIA, WITH LONG-TERM CURRENT USE OF INSULIN (HCC): Primary | ICD-10-CM

## 2023-08-28 DIAGNOSIS — E11.65 CONTROLLED TYPE 2 DIABETES MELLITUS WITH HYPERGLYCEMIA, WITH LONG-TERM CURRENT USE OF INSULIN (HCC): Primary | ICD-10-CM

## 2023-08-28 DIAGNOSIS — E78.2 MIXED HYPERLIPIDEMIA: ICD-10-CM

## 2023-08-28 PROCEDURE — 3079F DIAST BP 80-89 MM HG: CPT | Performed by: FAMILY MEDICINE

## 2023-08-28 PROCEDURE — 3008F BODY MASS INDEX DOCD: CPT | Performed by: FAMILY MEDICINE

## 2023-08-28 PROCEDURE — 99214 OFFICE O/P EST MOD 30 MIN: CPT | Performed by: FAMILY MEDICINE

## 2023-08-28 PROCEDURE — 3075F SYST BP GE 130 - 139MM HG: CPT | Performed by: FAMILY MEDICINE

## 2023-08-28 RX ORDER — SPIRONOLACTONE 50 MG/1
50 TABLET, FILM COATED ORAL DAILY
Qty: 90 TABLET | Refills: 3 | Status: SHIPPED | OUTPATIENT
Start: 2023-08-28

## 2023-09-22 ENCOUNTER — TELEPHONE (OUTPATIENT)
Dept: ENDOCRINOLOGY CLINIC | Facility: CLINIC | Age: 43
End: 2023-09-22

## 2023-09-22 NOTE — TELEPHONE ENCOUNTER
Received faxes requesting PA for metformin (?) and quantity limit on jardiance 10 mg. This may just be a refill too soon. Contacted pharmacy, could not get through to a person.

## 2023-10-20 ENCOUNTER — TELEPHONE (OUTPATIENT)
Dept: ENDOCRINOLOGY CLINIC | Facility: CLINIC | Age: 43
End: 2023-10-20

## 2023-10-20 NOTE — TELEPHONE ENCOUNTER
Received PA request on jardiance (per cover my meds, Simmie Givens does not require PA). Completed PA    Key: [id number]  Need help?  Call us at [phone]  Status  Sent to Plan today

## 2023-10-20 NOTE — TELEPHONE ENCOUNTER
Received fax from [facility] that PA is approved from 10/20/23 through 1/20/2024. Pt will be notified by mail/can take letter to pharmacy if need be. Sent approval to scan.

## 2023-11-05 ENCOUNTER — LAB ENCOUNTER (OUTPATIENT)
Dept: LAB | Facility: HOSPITAL | Age: 43
End: 2023-11-05
Attending: NURSE PRACTITIONER
Payer: MEDICAID

## 2023-11-05 DIAGNOSIS — Z79.4 CONTROLLED TYPE 2 DIABETES MELLITUS WITH HYPERGLYCEMIA, WITH LONG-TERM CURRENT USE OF INSULIN (HCC): ICD-10-CM

## 2023-11-05 DIAGNOSIS — Z79.4 TYPE 2 DIABETES MELLITUS WITH HYPERGLYCEMIA, WITH LONG-TERM CURRENT USE OF INSULIN (HCC): ICD-10-CM

## 2023-11-05 DIAGNOSIS — E11.65 TYPE 2 DIABETES MELLITUS WITH HYPERGLYCEMIA, WITH LONG-TERM CURRENT USE OF INSULIN (HCC): ICD-10-CM

## 2023-11-05 DIAGNOSIS — E11.65 CONTROLLED TYPE 2 DIABETES MELLITUS WITH HYPERGLYCEMIA, WITH LONG-TERM CURRENT USE OF INSULIN (HCC): ICD-10-CM

## 2023-11-05 LAB
ALBUMIN SERPL-MCNC: 3.3 G/DL (ref 3.4–5)
ALBUMIN/GLOB SERPL: 0.8 {RATIO} (ref 1–2)
ALP LIVER SERPL-CCNC: 67 U/L
ALT SERPL-CCNC: 25 U/L
ANION GAP SERPL CALC-SCNC: 4 MMOL/L (ref 0–18)
AST SERPL-CCNC: 10 U/L (ref 15–37)
BILIRUB SERPL-MCNC: 0.3 MG/DL (ref 0.1–2)
BUN BLD-MCNC: 23 MG/DL (ref 9–23)
CALCIUM BLD-MCNC: 9.1 MG/DL (ref 8.5–10.1)
CHLORIDE SERPL-SCNC: 107 MMOL/L (ref 98–112)
CHOLEST SERPL-MCNC: 164 MG/DL (ref ?–200)
CO2 SERPL-SCNC: 27 MMOL/L (ref 21–32)
CREAT BLD-MCNC: 1.08 MG/DL
CREAT UR-SCNC: 56.9 MG/DL
EGFRCR SERPLBLD CKD-EPI 2021: 65 ML/MIN/1.73M2 (ref 60–?)
EST. AVERAGE GLUCOSE BLD GHB EST-MCNC: 166 MG/DL (ref 68–126)
FASTING PATIENT LIPID ANSWER: YES
FASTING STATUS PATIENT QL REPORTED: YES
GLOBULIN PLAS-MCNC: 4.4 G/DL (ref 2.8–4.4)
GLUCOSE BLD-MCNC: 81 MG/DL (ref 70–99)
HBA1C MFR BLD: 7.4 % (ref ?–5.7)
HDLC SERPL-MCNC: 35 MG/DL (ref 40–59)
LDLC SERPL CALC-MCNC: 91 MG/DL (ref ?–100)
MICROALBUMIN UR-MCNC: 97.3 MG/DL
MICROALBUMIN/CREAT 24H UR-RTO: 1710 UG/MG (ref ?–30)
NONHDLC SERPL-MCNC: 129 MG/DL (ref ?–130)
OSMOLALITY SERPL CALC.SUM OF ELEC: 289 MOSM/KG (ref 275–295)
POTASSIUM SERPL-SCNC: 3.9 MMOL/L (ref 3.5–5.1)
PROT SERPL-MCNC: 7.7 G/DL (ref 6.4–8.2)
SODIUM SERPL-SCNC: 138 MMOL/L (ref 136–145)
TRIGL SERPL-MCNC: 224 MG/DL (ref 30–149)
VLDLC SERPL CALC-MCNC: 37 MG/DL (ref 0–30)

## 2023-11-05 PROCEDURE — 80053 COMPREHEN METABOLIC PANEL: CPT

## 2023-11-05 PROCEDURE — 83036 HEMOGLOBIN GLYCOSYLATED A1C: CPT

## 2023-11-05 PROCEDURE — 82043 UR ALBUMIN QUANTITATIVE: CPT

## 2023-11-05 PROCEDURE — 36415 COLL VENOUS BLD VENIPUNCTURE: CPT

## 2023-11-05 PROCEDURE — 82570 ASSAY OF URINE CREATININE: CPT

## 2023-11-05 PROCEDURE — 80061 LIPID PANEL: CPT

## 2023-11-14 ENCOUNTER — OFFICE VISIT (OUTPATIENT)
Dept: ENDOCRINOLOGY CLINIC | Facility: CLINIC | Age: 43
End: 2023-11-14
Payer: MEDICAID

## 2023-11-14 VITALS
WEIGHT: 192 LBS | DIASTOLIC BLOOD PRESSURE: 72 MMHG | BODY MASS INDEX: 31.99 KG/M2 | SYSTOLIC BLOOD PRESSURE: 138 MMHG | OXYGEN SATURATION: 94 % | HEIGHT: 65 IN | TEMPERATURE: 98 F | HEART RATE: 84 BPM | RESPIRATION RATE: 16 BRPM

## 2023-11-14 DIAGNOSIS — E11.65 TYPE 2 DIABETES MELLITUS WITH HYPERGLYCEMIA, WITH LONG-TERM CURRENT USE OF INSULIN (HCC): Primary | ICD-10-CM

## 2023-11-14 DIAGNOSIS — E11.29 PROTEINURIA DUE TO TYPE 2 DIABETES MELLITUS: ICD-10-CM

## 2023-11-14 DIAGNOSIS — Z79.4 TYPE 2 DIABETES MELLITUS WITH HYPERGLYCEMIA, WITH LONG-TERM CURRENT USE OF INSULIN (HCC): Primary | ICD-10-CM

## 2023-11-14 DIAGNOSIS — R80.9 PROTEINURIA DUE TO TYPE 2 DIABETES MELLITUS: ICD-10-CM

## 2023-11-14 PROCEDURE — 99214 OFFICE O/P EST MOD 30 MIN: CPT | Performed by: NURSE PRACTITIONER

## 2023-11-14 PROCEDURE — 3075F SYST BP GE 130 - 139MM HG: CPT | Performed by: NURSE PRACTITIONER

## 2023-11-14 PROCEDURE — 3078F DIAST BP <80 MM HG: CPT | Performed by: NURSE PRACTITIONER

## 2023-11-14 PROCEDURE — 3008F BODY MASS INDEX DOCD: CPT | Performed by: NURSE PRACTITIONER

## 2023-11-14 RX ORDER — DULAGLUTIDE 3 MG/.5ML
3 INJECTION, SOLUTION SUBCUTANEOUS WEEKLY
Qty: 2 ML | Refills: 3 | Status: SHIPPED | OUTPATIENT
Start: 2023-11-14

## 2023-11-14 RX ORDER — INSULIN LISPRO 100 [IU]/ML
INJECTION, SOLUTION INTRAVENOUS; SUBCUTANEOUS
Qty: 90 ML | Refills: 2 | Status: SHIPPED | OUTPATIENT
Start: 2023-11-14

## 2023-11-14 RX ORDER — PEN NEEDLE, DIABETIC 32GX 5/32"
NEEDLE, DISPOSABLE MISCELLANEOUS
Qty: 400 EACH | Refills: 3 | Status: SHIPPED | OUTPATIENT
Start: 2023-11-14

## 2023-11-14 RX ORDER — INSULIN GLARGINE 100 [IU]/ML
40 INJECTION, SOLUTION SUBCUTANEOUS 2 TIMES DAILY
Qty: 36 ML | Refills: 2 | Status: SHIPPED | OUTPATIENT
Start: 2023-11-14

## 2023-11-26 NOTE — ASSESSMENT & PLAN NOTE
Cholesterol shows Good control. Long term heart-healthy diet and lifestyle discussed and encouraged to reduce risk of cardiovascular disease. Cholesterol: 164, done on 11/5/2023. HDL Cholesterol: 35, done on 11/5/2023. TriGlycerides 224, done on 11/5/2023. LDL Cholesterol: 91, done on 11/5/2023. No current Cholesterol medication.

## 2023-11-26 NOTE — ASSESSMENT & PLAN NOTE
Last K was 3.9 done on 11/5/2023. Last Cr was 1.08 done on 11/5/2023. Hypertension meds include LOSARTAN 100 MG Oral Tab [[id number]], hydroCHLOROthiazide 25 MG Oral Tab [[id number]], spironolactone 50 MG Oral Tab [[id number]].

## 2023-11-26 NOTE — ASSESSMENT & PLAN NOTE
A1c is 7.4 done 11/5/2023 which shows hyperglycemia and borderline control, maintain vigilance and increase activity and medications as listed. Diabetic medications include Dulaglutide (TRULICITY) 3 OQ/8.1CF Subcutaneous Solution Pen-injector [[id number]], Insulin Lispro, 1 Unit Dial, (HUMALOG KWIKPEN) 100 UNIT/ML Subcutaneous Solution Pen-injector [[id number]], empagliflozin (JARDIANCE) 10 MG Oral Tab [[id number]], insulin glargine (LANTUS SOLOSTAR) 100 UNIT/ML Subcutaneous Solution Pen-injector [[id number]], metFORMIN  MG Oral Tablet 24 Hr [[id number]].    Cut to metforming 1500 daily, excellent Therapuetic Lifestyle Change with lifestyle, continue to decrease meds and [facility]

## 2023-11-27 ENCOUNTER — OFFICE VISIT (OUTPATIENT)
Dept: FAMILY MEDICINE CLINIC | Facility: CLINIC | Age: 43
End: 2023-11-27
Payer: MEDICAID

## 2023-11-27 VITALS
WEIGHT: 191.38 LBS | HEIGHT: 65 IN | HEART RATE: 78 BPM | RESPIRATION RATE: 12 BRPM | SYSTOLIC BLOOD PRESSURE: 138 MMHG | DIASTOLIC BLOOD PRESSURE: 80 MMHG | BODY MASS INDEX: 31.89 KG/M2

## 2023-11-27 DIAGNOSIS — Z79.4 TYPE 2 DIABETES MELLITUS WITH HYPERGLYCEMIA, WITH LONG-TERM CURRENT USE OF INSULIN (HCC): ICD-10-CM

## 2023-11-27 DIAGNOSIS — E11.65 TYPE 2 DIABETES MELLITUS WITH HYPERGLYCEMIA, WITH LONG-TERM CURRENT USE OF INSULIN (HCC): ICD-10-CM

## 2023-11-27 DIAGNOSIS — E11.65 CONTROLLED TYPE 2 DIABETES MELLITUS WITH HYPERGLYCEMIA, WITH LONG-TERM CURRENT USE OF INSULIN (HCC): Primary | ICD-10-CM

## 2023-11-27 DIAGNOSIS — E78.2 MIXED HYPERLIPIDEMIA: ICD-10-CM

## 2023-11-27 DIAGNOSIS — I10 ESSENTIAL HYPERTENSION: ICD-10-CM

## 2023-11-27 DIAGNOSIS — Z79.4 CONTROLLED TYPE 2 DIABETES MELLITUS WITH HYPERGLYCEMIA, WITH LONG-TERM CURRENT USE OF INSULIN (HCC): Primary | ICD-10-CM

## 2023-11-27 RX ORDER — HYDROCHLOROTHIAZIDE 25 MG/1
25 TABLET ORAL DAILY
Qty: 90 TABLET | Refills: 3 | Status: SHIPPED | OUTPATIENT
Start: 2023-11-27

## 2023-11-27 RX ORDER — METOPROLOL SUCCINATE 100 MG/1
100 TABLET, EXTENDED RELEASE ORAL DAILY
Qty: 90 TABLET | Refills: 3 | Status: SHIPPED | OUTPATIENT
Start: 2023-11-27

## 2023-11-27 RX ORDER — LOSARTAN POTASSIUM 100 MG/1
100 TABLET ORAL DAILY
Qty: 90 TABLET | Refills: 3 | Status: SHIPPED | OUTPATIENT
Start: 2023-11-27

## 2023-11-27 RX ORDER — METFORMIN HYDROCHLORIDE 500 MG/1
1000 TABLET, EXTENDED RELEASE ORAL 2 TIMES DAILY WITH MEALS
Qty: 360 TABLET | Refills: 1 | Status: CANCELLED | OUTPATIENT
Start: 2023-11-27

## 2023-11-27 RX ORDER — METFORMIN HYDROCHLORIDE 500 MG/1
TABLET, EXTENDED RELEASE ORAL
Qty: 270 TABLET | COMMUNITY
Start: 2023-11-27

## 2023-11-27 RX ORDER — SPIRONOLACTONE 50 MG/1
50 TABLET, FILM COATED ORAL DAILY
Qty: 90 TABLET | Refills: 3 | Status: SHIPPED | OUTPATIENT
Start: 2023-11-27

## 2023-12-11 DIAGNOSIS — Z79.4 TYPE 2 DIABETES MELLITUS WITH HYPERGLYCEMIA, WITH LONG-TERM CURRENT USE OF INSULIN (HCC): ICD-10-CM

## 2023-12-11 DIAGNOSIS — E11.65 TYPE 2 DIABETES MELLITUS WITH HYPERGLYCEMIA, WITH LONG-TERM CURRENT USE OF INSULIN (HCC): ICD-10-CM

## 2023-12-12 RX ORDER — INSULIN LISPRO 100 [IU]/ML
INJECTION, SOLUTION INTRAVENOUS; SUBCUTANEOUS
Qty: 90 ML | Refills: 2 | Status: SHIPPED | OUTPATIENT
Start: 2023-12-12

## 2023-12-12 NOTE — TELEPHONE ENCOUNTER
Requested Prescriptions     Pending Prescriptions Disp Refills    Insulin Lispro, 1 Unit Dial, 100 UNIT/ML Subcutaneous Solution Pen-injector [Pharmacy Med Name: INSULIN LISPRO 100U/ML KWIKPEN 3ML] 90 mL 2     Sig: USE AS DIRECTED BEFORE MEALS UP TO TOTAL DAILY DOSE  UNITS     Your appointments       Date & Time Appointment Department [facility])    Mar 12, 2024  3:45 PM CDT Diabetes Pump follow up with Inocencia Sullivan, [address], [address] [place] ( [facility])        Jun 01, 2024 10:00 AM CDT Exam - Established with Clyde Grace MD [address], [address], [place] ([address])              [address], [facility], [address] [place]  [address] [facility], [address], [place] [id number] [phone]          Last A1c value was 7.4% done 11/5/2023.     Refill 11/14/23  LOV 11/14/23

## 2024-01-17 DIAGNOSIS — Z79.4 TYPE 2 DIABETES MELLITUS WITH HYPERGLYCEMIA, WITH LONG-TERM CURRENT USE OF INSULIN (HCC): ICD-10-CM

## 2024-01-17 DIAGNOSIS — E11.65 TYPE 2 DIABETES MELLITUS WITH HYPERGLYCEMIA, WITH LONG-TERM CURRENT USE OF INSULIN (HCC): ICD-10-CM

## 2024-01-17 RX ORDER — INSULIN LISPRO 100 [IU]/ML
INJECTION, SOLUTION INTRAVENOUS; SUBCUTANEOUS
Qty: 90 ML | Refills: 2 | Status: SHIPPED | OUTPATIENT
Start: 2024-01-17

## 2024-01-17 NOTE — TELEPHONE ENCOUNTER
Received fax from patient pharmacy patient shouldn't need PA for Humalog last dispensed  12/9/23 called pharmacy no PA needed they are getting it ready for patient

## 2024-01-17 NOTE — TELEPHONE ENCOUNTER
Requested Prescriptions     Pending Prescriptions Disp Refills    INSULIN LISPRO, 1 UNIT DIAL, 100 UNIT/ML Subcutaneous Solution Pen-injector [Pharmacy Med Name: INSULIN LISPRO 100U/ML KWIKPEN 3ML] 90 mL 2     Sig: USE AS DIRECTED BEFORE MEALS UP TO TOTAL DAILY DOSE  UNITS     Future Appointments   Date Time Provider Department Center   3/12/2024  3:45 PM Lila Reeves APRN [facility]   6/1/2024 10:00 AM Emeka Bryson MD [facility]     Last A1c value was 7.4% done 11/5/2023.  Refill 12/12/23  LOV 11/14/23

## 2024-03-12 ENCOUNTER — OFFICE VISIT (OUTPATIENT)
Dept: ENDOCRINOLOGY CLINIC | Facility: CLINIC | Age: 44
End: 2024-03-12
Payer: MEDICAID

## 2024-03-12 VITALS
RESPIRATION RATE: 16 BRPM | DIASTOLIC BLOOD PRESSURE: 72 MMHG | WEIGHT: 193 LBS | OXYGEN SATURATION: 94 % | HEIGHT: 65 IN | HEART RATE: 84 BPM | BODY MASS INDEX: 32.15 KG/M2 | SYSTOLIC BLOOD PRESSURE: 152 MMHG

## 2024-03-12 DIAGNOSIS — R80.9 PROTEINURIA DUE TO TYPE 2 DIABETES MELLITUS (HCC): ICD-10-CM

## 2024-03-12 DIAGNOSIS — E11.29 PROTEINURIA DUE TO TYPE 2 DIABETES MELLITUS (HCC): ICD-10-CM

## 2024-03-12 DIAGNOSIS — Z79.4 TYPE 2 DIABETES MELLITUS WITH MICROALBUMINURIA, WITH LONG-TERM CURRENT USE OF INSULIN (HCC): Primary | ICD-10-CM

## 2024-03-12 DIAGNOSIS — E11.29 TYPE 2 DIABETES MELLITUS WITH MICROALBUMINURIA, WITH LONG-TERM CURRENT USE OF INSULIN (HCC): Primary | ICD-10-CM

## 2024-03-12 DIAGNOSIS — R80.9 TYPE 2 DIABETES MELLITUS WITH MICROALBUMINURIA, WITH LONG-TERM CURRENT USE OF INSULIN (HCC): Primary | ICD-10-CM

## 2024-03-12 LAB
CARTRIDGE LOT#: 663 NUMERIC
HEMOGLOBIN A1C: 7.7 % (ref 4.3–5.6)

## 2024-03-12 PROCEDURE — 99214 OFFICE O/P EST MOD 30 MIN: CPT | Performed by: NURSE PRACTITIONER

## 2024-03-12 PROCEDURE — 83036 HEMOGLOBIN GLYCOSYLATED A1C: CPT | Performed by: NURSE PRACTITIONER

## 2024-03-12 RX ORDER — ORAL SEMAGLUTIDE 7 MG/1
7 TABLET ORAL DAILY
Qty: 30 TABLET | Refills: 3 | Status: SHIPPED | OUTPATIENT
Start: 2024-04-12 | End: 2024-05-12

## 2024-03-12 RX ORDER — INSULIN GLARGINE 100 [IU]/ML
40 INJECTION, SOLUTION SUBCUTANEOUS 2 TIMES DAILY
Qty: 36 ML | Refills: 2 | Status: SHIPPED | OUTPATIENT
Start: 2024-03-12

## 2024-03-12 RX ORDER — METFORMIN HYDROCHLORIDE 500 MG/1
TABLET, EXTENDED RELEASE ORAL
Qty: 270 TABLET | Refills: 1 | Status: SHIPPED | OUTPATIENT
Start: 2024-03-12

## 2024-03-12 RX ORDER — ORAL SEMAGLUTIDE 3 MG/1
3 TABLET ORAL DAILY
Qty: 30 TABLET | Refills: 0 | Status: SHIPPED | COMMUNITY
Start: 2024-03-12 | End: 2024-04-11

## 2024-03-12 NOTE — PROGRESS NOTES
Chief Complaint   Patient presents with    Diabetes     Follow up     HPI:   Teresa De Jesus Alanis Reyes is a 43 year old female who presents for a follow up visit for management of diabetes. Last A1c value was 7.7% done 3/12/2024. Since last visit diabetes management has been worsening slightly however pt has been out of trulicity for 3 months due to med shortage. Pt states she is feeling more hungry and has increased her basal insulin from 30 units BID to 40 units BID.     Diabetes history:  Type: 2  Onset: ~   Pt has not been admitted to the hospital for blood sugars.   Pt does not have a hx of pancreatitis.     Current DM regimen:  Metformin  mg - Take 2 tabs in am and 1 tab in pm  Jardiance 10 m tab daily in am   Trulicity 3 mg injection once weekly (has been off x3 months)  Lantus: Inject 40 units twice daily  Humalo units before meals 3x daily plus follow the scale: (typically 20-30 per meal)  If glucose is 141-180 mg/dl take extra 1 unit   If glucose is 181-220 mg/dl take extra 2 unit   If glucose is 221-260 mg/dl take extra 3 unit   If glucose is 261-300 mg/dl take extra 4 unit   If glucose is 301-340 mg/dl take extra 5 unit     Previous DM therapies:  Toujeo, Tresiba - ins coverage   Synjardy - ins coverage  repaglinide - started on prandial insulin  Basaglar - insurance prefers levemir or lantus    Complications/Co-morbidities:   Proteinuria      Modifying factors:  Medication adherence: yes  Recent illness/steroids: no       Overall glucose control:   HGBA1C:    Lab Results   Component Value Date    A1C 7.7 (A) 2024    A1C 7.4 (H) 2023    A1C 7.5 (A) 2023     (H) 2023            Past History:   She  has a past medical history of Calculus of kidney, Diabetes (HCC), High blood pressure, and History of blood transfusion (2020).   Her family history includes Diabetes in her mother and sister.   She  reports that she has never smoked. She has never used  smokeless tobacco. She reports that she does not drink alcohol and does not use drugs.     She is allergic to peanut butter flavor.     Current Outpatient Medications on File Prior to Visit   Medication Sig    Insulin Lispro, 1 Unit Dial, 100 UNIT/ML Subcutaneous Solution Pen-injector USE AS DIRECTED BEFORE MEALS UP TO TOTAL DAILY DOSE  UNITS    hydroCHLOROthiazide 25 MG Oral Tab Take 1 tablet (25 mg total) by mouth daily.    losartan 100 MG Oral Tab Take 1 tablet (100 mg total) by mouth daily.    spironolactone 50 MG Oral Tab Take 1 tablet (50 mg total) by mouth daily.    metoprolol succinate  MG Oral Tablet 24 Hr Take 1 tablet (100 mg total) by mouth daily.    Insulin Pen Needle (TRUEPLUS 5-BEVEL PEN NEEDLES) 32G X 4 MM Does not apply Misc Use pen needles 4x daily.    Blood Glucose Monitoring Suppl (ONETOUCH VERIO IQ SYSTEM) w/Device Does not apply Kit 1 Device by Other route in the morning, at noon, in the evening, and at bedtime. Use as directed.    Glucose Blood (ONETOUCH VERIO) In Vitro Strip Check glucose at least 4x daily.    Blood Glucose Monitoring Suppl (ONETOUCH VERIO IQ SYSTEM) w/Device Does not apply Kit 1 Device by Other route 2 (two) times daily. Use as directed.    OneTouch Delica Lancets 33G Does not apply Misc Check glucose 4x daily before insulin injections.    Insulin Pen Needle 32G X 4 MM Does not apply Misc Use for insulin QID    AMLODIPINE 10 MG Oral Tab TAKE 1 TABLET(10 MG) BY MOUTH DAILY    Insulin Syringe-Needle U-100 31G X 15/64\" 1 ML Does not apply Misc 1 Syringe 3 (three) times daily before meals.    potassium citrate 10 MEQ (1080 MG) Oral Tab CR Take 2 tablets (20 mEq total) by mouth 2 (two) times a day. (Patient not taking: Reported on 3/12/2024)     No current facility-administered medications on file prior to visit.       BP Readings from Last 3 Encounters:   03/12/24 152/72   11/27/23 138/80   11/14/23 138/72     Wt Readings from Last 3 Encounters:   03/12/24 193 lb  (87.5 kg)   11/27/23 191 lb 6.4 oz (86.8 kg)   11/14/23 192 lb (87.1 kg)     CMP  (most recent labs)   Lab Results   Component Value Date/Time    GLU 81 11/05/2023 08:06 AM    BUN 23 11/05/2023 08:06 AM    CREATSERUM 1.08 (H) 11/05/2023 08:06 AM    GFRNAA 60 04/23/2022 06:10 AM    GFRAA 70 04/23/2022 06:10 AM    EGFRCR 65 11/05/2023 08:06 AM    CA 9.1 11/05/2023 08:06 AM    ALKPHO 67 11/05/2023 08:06 AM    AST 10 (L) 11/05/2023 08:06 AM    ALT 25 11/05/2023 08:06 AM    BILT 0.3 11/05/2023 08:06 AM    TP 7.7 11/05/2023 08:06 AM    ALB 3.3 (L) 11/05/2023 08:06 AM     11/05/2023 08:06 AM    K 3.9 11/05/2023 08:06 AM     11/05/2023 08:06 AM    CO2 27.0 11/05/2023 08:06 AM            Lipid panel:  Cholesterol: 164, done on 11/5/2023.  HDL Cholesterol: 35, done on 11/5/2023.  LDL Cholesterol: 91, done on 11/5/2023.  TriGlycerides 224, done on 11/5/2023.        Thyroid  (most recent labs)   Lab Results   Component Value Date/Time    TSH 1.290 12/04/2021 06:08 AM        Micro Albumen/Creatinine:    Lab Results   Component Value Date    MICROALBCREA 1,710.0 (H) 11/05/2023    MICROALBCREA 1,630.0 (H) 02/12/2023    MICROALBCREA 3,763.9 (H) 03/12/2022          Lab results reviewed with patient.    REVIEW OF SYSTEMS:   Review of Systems  GENERAL HEALTH: feels fatigued  SKIN: denies any unusual skin lesions or rashes, + lipohypertrophy of the abdomen  EYES: reports occasional vision changes  RESPIRATORY: denies shortness of breath with exertion  CARDIOVASCULAR: denies chest pain on exertion  GI: denies abdominal pain, N/V/D  : Denies vaginal itching or discharge  NEURO: denies numbness and tingling to extremities, denies HA   ENDO: denies polyuria, polyphagia, polydipsia     EXAM:   /72   Pulse 84   Resp 16   Ht 5' 5\" (1.651 m)   Wt 193 lb (87.5 kg)   SpO2 94%   BMI 32.12 kg/m²  Estimated body mass index is 32.12 kg/m² as calculated from the following:    Height as of this encounter: 5' 5\" (1.651 m).     Weight as of this encounter: 193 lb (87.5 kg).   Physical Exam  Pulmonary: Pulmonary effort is normal  Neurologic: Alert and oriented   Psychiatric: Normal mood and affect  Musculoskeletal:  Diabetes foot exam:   Good foot hygiene.   Bilateral barefoot skin diabetic exam: normal.  Visualized feet and the appearance : normal.  Bilateral monofilament/sensation of both feet is normal. Vibration to dorsum to the first toe perceived.   Bilateral 2+ pedal pulse on exam     ASSESSMENT AND PLAN:   As for her Diabetes, it is reasonably well controlled. Discussed with trulicity shortage will change to Rybelsus. Pt given sample to start and discussed dosing instructions.   Medications:  Continue:   Metformin  mg - Take 2 tabs in am and 1 tab in pm  Jardiance 10 m tab daily in am   Lantus: Inject 40 units twice daily  (reduce to 35 units twice daily when you increase rybelsus in 1 month)  Humalo units before meals 3x daily plus follow the scale: (typically 20-30 per meal)  If glucose is 141-180 mg/dl take extra 1 unit   If glucose is 181-220 mg/dl take extra 2 unit   If glucose is 221-260 mg/dl take extra 3 unit   If glucose is 261-300 mg/dl take extra 4 unit   If glucose is 301-340 mg/dl take extra 5 unit     Change:   Start Rybelsus 3 mg daily in am for 4 weeks then increase to 7 mg daily  (follow dosing instructions)        Recommendations:   BGM 3-4x daily, targets reviewed and provided in AVS (not interested in CGM)  lose weight by increased dietary compliance and exercise   Reminded to get annual retinal exam, referral given  check feet daily  check labs as ordered      Cardiovascular:  The 10-year ASCVD risk score (Josef WIGGINS, et al., 2019) is: 4.3%    Values used to calculate the score:      Age: 43 years      Sex: Female      Is Non- : No      Diabetic: Yes      Tobacco smoker: No      Systolic Blood Pressure: 152 mmHg      Is BP treated: Yes      HDL Cholesterol: 35 mg/dL       Total Cholesterol: 164 mg/dL     As for her hypertension, Blood Pressure is needs further observation. Per pt readings are 120's/80's at home and has white coat syndrome in office. Pt is on ace/arb as well as multiple agents for BP.   PLAN: will continue present medications, reviewed diet, exercise and weight control, followed by PCP.      As for her cholesterol, Lipids are reasonably well controlled. Pt is on high intensity statin.   PLAN: will continue present medications, reviewed diet, exercise and weight control, and labs as ordered.     Patient is not on aspirin therapy due to ascvd risk <10%.     DM Health Maintenance  Nephropathy screening: continue ace /arb rx, with persistent elevated proteinuria I have referred pt to nephrology (pt has not scheduled)  Last dilated eye exam: No data recorded Exam shows retinopathy? No data recorded  Last diabetic foot exam: Last Foot Exam: 22      Date of last PHQ-2 depression screen: PHQ-2 - Date of last depression screenin2023    Patient  reports that she has never smoked. She has never used smokeless tobacco.  When is flu vaccine due? No recommendations at this time  When is pneumonia vaccine due? Pneumococcal Vaccination(1 of 2 - PCV) Never done  Dentist : recommend every 6m     The patient indicates understanding of these issues and agrees to the plan.  Refills sent at time of office visit.    Diagnoses and all orders for this visit:    Type 2 diabetes mellitus with microalbuminuria, with long-term current use of insulin ([facility])  -     POC Hgb A1C  -     Semaglutide (RYBELSUS) 3 MG Oral Tab; Take 3 mg by mouth daily.  -     Semaglutide (RYBELSUS) 7 MG Oral Tab; Take 7 mg by mouth daily.  -     metFORMIN  MG Oral Tablet 24 Hr; Take 2 tablets (1,000 mg total) by mouth daily with breakfast AND 1 tablet (500 mg total) daily with dinner.  -     empagliflozin (JARDIANCE) 10 MG Oral Tab; Take 1 tablet (10 mg total) by mouth daily.  -     insulin glargine  (LANTUS SOLOSTAR) 100 UNIT/ML Subcutaneous Solution Pen-injector; Inject 40 Units into the skin 2 (two) times daily. Inject up to 80 units daily    Proteinuria due to type 2 diabetes mellitus (HCC)  -     empagliflozin (JARDIANCE) 10 MG Oral Tab; Take 1 tablet (10 mg total) by mouth daily.           Do the chronicity and potential for complications of disease pt will need ongoing monitoring.   Return in about 3 months (around 6/12/2024).    Spent 30 min obtaining patient history, evaluating patient, reviewing blood glucose trends, discussing treatment options, lifestyle modifications and completing documentation -this time does not including sensor interpretation time if applicable.   The risks and benefits of my recommendations, as well as other treatment options were discussed with the patient today. Questions were also answered to the best of my knowledge.    Lila MERAZ

## 2024-03-12 NOTE — PATIENT INSTRUCTIONS
El último valor de A1c fue del 7,7 % realizado el 2024.       Medicamentos:  Continuar:  Metformina  mg: tome 2 tabletas por la mañana y 1 tableta por la tarde  Jardiance 10 m pestaña al día por la mañana  Lantus: Inyecte 40 unidades dos veces al día (reduzca a 35 unidades dos veces al día cuando aumente rybelsus en 1 mes)  Humalo unidades antes de las comidas 3 veces al día y siga la escala: (normalmente 20-30 por comida)  Si la glucosa es de 141-180 mg/dl, tome 1 unidad adicional.  Si la glucosa es de 181-220 mg/dl, tome 2 unidades adicionales.  Si la glucosa es de 221-260 mg/dl, tome 3 unidades adicionales.  Si la glucosa es de 261-300 mg/dl, tome 4 unidades adicionales.  Si la glucosa es de 301-340 mg/dl, tome 5 unidades adicionales.    Cambiar:  Comience con Rybelsus 3 mg al día por la mañana agustín 4 semanas y luego aumente a 7 mg al día (siga las instrucciones de dosificación)        Instrucciones de dosificación: es muy importante seguir las reglas de dosificación, de lo contrario el medicamento no será tan eficaz.    Trague la pastilla entera (no la triture ni mastique)    Debe tomarse con el estómago vacío; habrá que retrasar la ingesta de alimentos o líquidos (incluso agua) agustín 30 minutos.  La píldora sólo debe tomarse con menos de 4 onzas de AGUA gianna.    No lo tome con ningún otro medicamento ~ y además vish el medicamento con alimentos o más agua hará que el medicamento sea MENOS efectivo.    Rybelso:  Pertenece a candy clase de medicamentos llamados: agonistas del receptor de GLP-1. Estas hormonas intestinales funcionan de las siguientes maneras:  Ayudando a las células beta a liberar más insulina cuando hay comida en el estómago y los intestinos. El aumento de insulina reduce los niveles de azúcar en la kat[place].  Al evitar que el hígado libere azúcar a la [place] cuando NO es necesario.  Al ralentizar el movimiento de los alimentos a través del estómago para que el azúcar entre  a la [place] más lentamente.  Haciéndole sentir lleno, lo que ayuda a disminuir la cantidad de comida que ingiere.    Rybelsus Efectos secundarios comunes:  Náuseas o diarrea  Estos efectos generalmente desaparecen con el tiempo a medida que melo cuerpo se acostumbra al medicamento.    Aquí hay algunas cosas que podrían ayudar a que las náuseas desaparezcan:    Consuma pequeñas cantidades de alimentos en lugar de algunas comidas abundantes.  Consuma alimentos sencillos, suaves y no grasosos.  Malu muchos líquidos (sin azúcar)  Evite alimentos y olores que puedan enfermarlo.  Come despacio y adaptado a tu hambre.    1. Para determinar cualquier patrón en melo nivel de azúcar en kat[place], deberá medir melo nivel de azúcar en kat[place] 3 o 4 veces al día.  2. Programe un examen de la vista para diabéticos antes de la próxima visita.      Regreso en unos 3 meses (alrededor del 12/6/2024).        Seguimos trabajando juntos para mantener david niveles de azúcar en la [place] dentro de los objetivos.    El objetivo principal del tratamiento de la diabetes es evitar que el nivel de azúcar en la [place] suba demasiado. Medimos david tendencias generales de azúcar en la [place] con candy prueba de hemoglobina A1C. (también llamado A1C) Para la mayoría de las personas, el objetivo es menos del 7,0 %, jalen a veces hacemos excepciones según la edad, el historial de marvin y otros factores.  Mantener un A1C por debajo del 7% ayuda a prevenir problemas de marvin relacionados con la diabetes.  Si melo A1C sube demasiado, entonces debemos hablar sobre cambiar melo tratamiento actual para la diabetes.    Es importante vish todos david medicamentos según lo prescrito.  Además, llámeme si tiene algún problema con preguntas sobre medicamentos, efectos secundarios, preguntas sobre dosis o problemas con las tendencias de azúcar en la [place] ANTES DE CAMBIAR O SUSPENDER CUALQUIER MEDICAMENTO.    Prueba de azúcar en la [place]:  Recuerde llevar melo medidor de glucosa o  registro de azúcar en la [place] a cada italo en el centro de diabetes.  Etna me permite hacer ajustes de forma garces a melo plan de diabetes.  Horarios recomendados para realizar la prueba: antes del desayuno (en ayunas) y luego alternar la prueba de azúcar en [place] 2 horas después de las comidas.    Metas de azúcar en la kat[place]:  Antes del desayuno:  (preferiblemente menos de 110)  2 horas Después de las comidas: menos de 180 (preferiblemente menos de 150)  Pregunte por niveles persistentes de azúcar en la [place] por debajo de 75 o por encima de 200.  Los niveles de azúcar en la [place] por encima de 200 no son aceptables para alcanzar melo objetivo de mejorar la diabetes  Esté atento a los niveles bajos de azúcar en la [place]: (menos de 70)      Síntomas de niveles bajos de azúcar en la [place]:  Temblores o mareos  Piel fría o húmeda  Estar hambriento  Dolor de ewa  Nerviosismo  Un latido del corazón hattie y rápido  Punto débil  Confusión o irritabilidad  Visión borrosa  Tener pesadillas o despertarse confundido o sudando  Entumecimiento u hormigueo en los labios o la lengua    Plan de acción para el tratamiento de la hipoglucemia  1. Controle melo nivel de glucosa en [place] para asegurarse de que esté bajo. No siempre se pueden seguir los síntomas. En leila de lon, trate melo nivel bajo de glucosa en kat[place] de todos modos.  2. Adina 15 gramos de carbohidratos (hidratos de carbono). Aquí hay algunas opciones:  4 onzas. jugo de frutas regular  3-4 tabletas de glucosa  6 onzas. refresco normal  7-8 gominolas  3. Vuelva a controlar melo glucosa en [place] después de 10 a 15 minutos. Si la glucemia sigue baja (menos de 70 mg/dl) repetir el tratamiento (paso 2).  4. Si falta más de candy hora para melo próxima comida, tome un refrigerio pequeño.  5. Si no está seguro de cuál es la causa de melo nivel bajo de glucosa en kat[place], llame a melo proveedor de atención médica.  6. Controla siempre tu nivel de glucosa en kat[place] antes de  suzanne Abrams  Lilagabe Reeves APRN  [phone]  ____________________________________________________________________________________________________________________________________________________    Last A1c value was 7.7% done 3/12/2024.       Medications:  Continue:   Metformin  mg - Take 2 tabs in am and 1 tab in pm  Jardiance 10 m tab daily in am   Lantus: Inject 40 units twice daily  (reduce to 35 units twice daily when you increase rybelsus in 1 month)  Humalo units before meals 3x daily plus follow the scale: (typically 20-30 per meal)  If glucose is 141-180 mg/dl take extra 1 unit   If glucose is 181-220 mg/dl take extra 2 unit   If glucose is 221-260 mg/dl take extra 3 unit   If glucose is 261-300 mg/dl take extra 4 unit   If glucose is 301-340 mg/dl take extra 5 unit     Change:   Start Rybelsus 3 mg daily in am for 4 weeks then increase to 7 mg daily  (follow dosing instructions)        Dosing instructions: it is very important to follow the dosing rules otherwise the medication will not be as effective.     Please swallow the pill whole (do not crush or chew the tablet)     It must be taken on empty stomach- you will have to delay eating or drinking liquids (even water ) for 30 minutes -   The pill must be only taken with less than 4 ounces of plain WATER     Please do not take with any other medicines~ and also taking the medication with food or more water will make the medication LESS effective    Rybelsus:   Belongs to a class of drugs called: GLP-1 receptor agonists. This gut hormones works in the following ways:   By helping beta cells release more inslin when there is food in the stomach and intestines. The increased insulin lowers blood sugars  By stopping the liver from releasing sugar in to the blood when it is NOT needed   By slowing the movement of food throug the stomach so that  sugar enters the blood more slowly   By making you feel full which helps decrease the  amount of food that you eat.     Rybelsus Common side effects:   Nausea or diarrhea   These effects usually go away over time as your body gets used to the medicine     Here are some things that might help your nausea go away:     Eat small amounts of food instrad of few large meals  Eat plain, bland non greasy foods   Drink plenty of fluids (sugar free)   Avoid foods and smells that might make you sick   Eat slowly and listed to your hunger      In order to determine any pattern in your blood sugar level, you will need to measure your blood sugar 3-4 times a day.   Please schedule diabetic eye exam before next visit.       Return in about 3 months (around 6/12/2024).         We continue to work together to keep your blood sugar levels on target.    The main goal of treating diabetes is to keep your blood sugar from getting too high. We measure your overall blood sugar trends with a hemoglobin A1C test. (also called A1C) For most people, the goal is less than 7.0%, but we sometimes make exceptions based on age, health history, and other factors.  Keeping an A1C below 7% helps prevent diabetes-related health problems.  If your A1C gets too high, then we need to talk about changing your current diabetes treatment.    It is important to take all of your medications as prescribed.  Also, please call me if you have any problems with questions about medications, side effects, questions about dosage, or problems with blood sugar trends BEFORE YOU SWITCH OR STOP ANY MEDICINES.    Blood sugar test:  Remember to bring your glucose meter or blood sugar log to every appointment at the diabetes center.  This allows me to safely make adjustments to your diabetes plan.  Recommended times to perform the test: before breakfast (fasting) and then alternate the blood sugar test 2 hours after meals.    Blood sugar goals:  Before breakfast:  (preferably less than 110)  2 hours After meals: less than 180 (preferably less than  150)  Ask for persistent blood sugar levels below 75 or above 200.  Blood sugar levels above 200 are not acceptable to reach your goal of improving diabetes  Watch for low blood sugar levels: (less than 70)      Symptoms of low blood sugar levels:  Tremors or dizziness  Clammy or clammy skin  To be hungry  Headache  Nervousness  A strong and fast heartbeat  Soft spot  Confusion or irritability  Blurred vision  Having nightmares or waking up confused or sweating  Numbness or tingling in the lips or tongue    Action plan for the treatment of hypoglycemia  1. Check your blood glucose to make sure it is low. You can't always follow the symptoms. When in doubt, treat your low blood glucose anyway.  2. Take 15 grams of carbohydrates (carbohydrates). Here are some options:  4 oz. regular fruit juice  3-4 glucose tablets  6 oz. regular soda  7-8 jelly beans  3. Check your blood glucose again after 10-15 minutes. If blood glucose is still low (less than 70 mg / dl) repeat the treatment (step 2).  4. If your next meal is more than an hour away, have a small snack.  5. If you are not sure what is causing your low blood glucose, call your healthcare provider.  6. Always check your blood glucose before driving    Thank you,  Lila Reeves APRN  [phone]

## 2024-03-13 ENCOUNTER — TELEPHONE (OUTPATIENT)
Dept: ENDOCRINOLOGY CLINIC | Facility: CLINIC | Age: 44
End: 2024-03-13

## 2024-03-13 NOTE — TELEPHONE ENCOUNTER
Received fax from [facility] for both rybelsus 3mg/7mg that PA was denied as pt must have medical records showing type 2 diabetes    Faxed OV notes to appeals at [phone]

## 2024-03-27 NOTE — TELEPHONE ENCOUNTER
Attempted to speak with pharmacy to see if Rybelsus is going through.  Held for over 10 minutes and no one answered.  Can try later.

## 2024-04-03 NOTE — TELEPHONE ENCOUNTER
Call from [facility] to see if an appeal was needed - confirmed pt was able to  medication/went through no issue

## 2024-05-31 NOTE — ASSESSMENT & PLAN NOTE
BP shows poor control with last BP of 152/72. Lifestyle changes, diet, exercise and weight loss were counseled. Medication changes as listed.   Last K was 3.9 done on 11/5/2023.  Last Cr was 1.08 done on 11/5/2023.  Last eGFR was 65 on 11/5/2023.  BP Meds: amLODIPine Tabs - 10 MG; hydroCHLOROthiazide Tabs - 25 MG; losartan Tabs - 100 MG; metoprolol succinate ER Tb24 - 100 MG; spironolactone Tabs - 50 MG

## 2024-05-31 NOTE — ASSESSMENT & PLAN NOTE
A1c is 7.7 done 3/12/2024 which shows hyperglycemia and borderline control, maintain vigilance and increase activity and medications as listed.  Diabetic medications include Insulin Lispro, 1 Unit Dial, 100 UNIT/ML Subcutaneous Solution Pen-injector [[id number]], empagliflozin (JARDIANCE) 10 MG Oral Tab [[id number]], insulin glargine (LANTUS SOLOSTAR) 100 UNIT/ML Subcutaneous Solution Pen-injector [[id number]], metFORMIN  MG Oral Tablet 24 Hr [[id number]]. Better control. DM Meds: empagliflozin Tabs - 10 MG; Insulin Lispro (1 Unit Dial) Sopn - 100 UNIT/ML; Lantus SoloStar Sopn - 100 UNIT/ML; metFORMIN ER Tb24 - 500 MG

## 2024-06-01 ENCOUNTER — LAB ENCOUNTER (OUTPATIENT)
Dept: LAB | Facility: HOSPITAL | Age: 44
End: 2024-06-01
Attending: FAMILY MEDICINE
Payer: MEDICAID

## 2024-06-01 ENCOUNTER — OFFICE VISIT (OUTPATIENT)
Dept: FAMILY MEDICINE CLINIC | Facility: CLINIC | Age: 44
End: 2024-06-01
Payer: MEDICAID

## 2024-06-01 VITALS
BODY MASS INDEX: 31.43 KG/M2 | SYSTOLIC BLOOD PRESSURE: 128 MMHG | HEIGHT: 65 IN | WEIGHT: 188.63 LBS | DIASTOLIC BLOOD PRESSURE: 84 MMHG | RESPIRATION RATE: 14 BRPM | HEART RATE: 76 BPM

## 2024-06-01 DIAGNOSIS — E78.2 MIXED HYPERLIPIDEMIA: ICD-10-CM

## 2024-06-01 DIAGNOSIS — R80.9 PROTEINURIA DUE TO TYPE 2 DIABETES MELLITUS (HCC): ICD-10-CM

## 2024-06-01 DIAGNOSIS — E11.29 TYPE 2 DIABETES MELLITUS WITH MICROALBUMINURIA, WITH LONG-TERM CURRENT USE OF INSULIN (HCC): ICD-10-CM

## 2024-06-01 DIAGNOSIS — E11.65 CONTROLLED TYPE 2 DIABETES MELLITUS WITH HYPERGLYCEMIA, WITH LONG-TERM CURRENT USE OF INSULIN (HCC): ICD-10-CM

## 2024-06-01 DIAGNOSIS — I10 ESSENTIAL HYPERTENSION: ICD-10-CM

## 2024-06-01 DIAGNOSIS — Z79.4 TYPE 2 DIABETES MELLITUS WITH MICROALBUMINURIA, WITH LONG-TERM CURRENT USE OF INSULIN (HCC): ICD-10-CM

## 2024-06-01 DIAGNOSIS — R80.9 TYPE 2 DIABETES MELLITUS WITH MICROALBUMINURIA, WITH LONG-TERM CURRENT USE OF INSULIN (HCC): ICD-10-CM

## 2024-06-01 DIAGNOSIS — Z79.4 CONTROLLED TYPE 2 DIABETES MELLITUS WITH HYPERGLYCEMIA, WITH LONG-TERM CURRENT USE OF INSULIN (HCC): ICD-10-CM

## 2024-06-01 DIAGNOSIS — E55.9 VITAMIN D DEFICIENCY: ICD-10-CM

## 2024-06-01 DIAGNOSIS — Z79.4 CONTROLLED TYPE 2 DIABETES MELLITUS WITH HYPERGLYCEMIA, WITH LONG-TERM CURRENT USE OF INSULIN (HCC): Primary | ICD-10-CM

## 2024-06-01 DIAGNOSIS — E11.65 CONTROLLED TYPE 2 DIABETES MELLITUS WITH HYPERGLYCEMIA, WITH LONG-TERM CURRENT USE OF INSULIN (HCC): Primary | ICD-10-CM

## 2024-06-01 DIAGNOSIS — E11.29 PROTEINURIA DUE TO TYPE 2 DIABETES MELLITUS (HCC): ICD-10-CM

## 2024-06-01 LAB
ALBUMIN SERPL-MCNC: 3.6 G/DL (ref 3.4–5)
ALBUMIN/GLOB SERPL: 0.8 {RATIO} (ref 1–2)
ALP LIVER SERPL-CCNC: 75 U/L
ALT SERPL-CCNC: 18 U/L
ANION GAP SERPL CALC-SCNC: 4 MMOL/L (ref 0–18)
AST SERPL-CCNC: 9 U/L (ref 15–37)
BASOPHILS # BLD AUTO: 0.04 X10(3) UL (ref 0–0.2)
BASOPHILS NFR BLD AUTO: 0.3 %
BILIRUB SERPL-MCNC: 0.4 MG/DL (ref 0.1–2)
BUN BLD-MCNC: 28 MG/DL (ref 9–23)
CALCIUM BLD-MCNC: 9.4 MG/DL (ref 8.5–10.1)
CHLORIDE SERPL-SCNC: 104 MMOL/L (ref 98–112)
CHOLEST SERPL-MCNC: 213 MG/DL (ref ?–200)
CO2 SERPL-SCNC: 28 MMOL/L (ref 21–32)
CREAT BLD-MCNC: 1.35 MG/DL
CREAT UR-SCNC: 64.6 MG/DL
EGFRCR SERPLBLD CKD-EPI 2021: 49 ML/MIN/1.73M2 (ref 60–?)
EOSINOPHIL # BLD AUTO: 0.27 X10(3) UL (ref 0–0.7)
EOSINOPHIL NFR BLD AUTO: 2.2 %
ERYTHROCYTE [DISTWIDTH] IN BLOOD BY AUTOMATED COUNT: 14.7 %
EST. AVERAGE GLUCOSE BLD GHB EST-MCNC: 151 MG/DL (ref 68–126)
FASTING PATIENT LIPID ANSWER: YES
FASTING STATUS PATIENT QL REPORTED: YES
GLOBULIN PLAS-MCNC: 4.6 G/DL (ref 2.8–4.4)
GLUCOSE BLD-MCNC: 73 MG/DL (ref 70–99)
HBA1C MFR BLD: 6.9 % (ref ?–5.7)
HCT VFR BLD AUTO: 44.5 %
HDLC SERPL-MCNC: 45 MG/DL (ref 40–59)
HGB BLD-MCNC: 14.6 G/DL
IMM GRANULOCYTES # BLD AUTO: 0.06 X10(3) UL (ref 0–1)
IMM GRANULOCYTES NFR BLD: 0.5 %
LDLC SERPL CALC-MCNC: 128 MG/DL (ref ?–100)
LYMPHOCYTES # BLD AUTO: 3.06 X10(3) UL (ref 1–4)
LYMPHOCYTES NFR BLD AUTO: 24.7 %
MCH RBC QN AUTO: 29 PG (ref 26–34)
MCHC RBC AUTO-ENTMCNC: 32.8 G/DL (ref 31–37)
MCV RBC AUTO: 88.3 FL
MICROALBUMIN UR-MCNC: 149 MG/DL
MICROALBUMIN/CREAT 24H UR-RTO: 2306.5 UG/MG (ref ?–30)
MONOCYTES # BLD AUTO: 1.02 X10(3) UL (ref 0.1–1)
MONOCYTES NFR BLD AUTO: 8.2 %
NEUTROPHILS # BLD AUTO: 7.95 X10 (3) UL (ref 1.5–7.7)
NEUTROPHILS # BLD AUTO: 7.95 X10(3) UL (ref 1.5–7.7)
NEUTROPHILS NFR BLD AUTO: 64.1 %
NONHDLC SERPL-MCNC: 168 MG/DL (ref ?–130)
OSMOLALITY SERPL CALC.SUM OF ELEC: 286 MOSM/KG (ref 275–295)
PLATELET # BLD AUTO: 323 10(3)UL (ref 150–450)
POTASSIUM SERPL-SCNC: 4.1 MMOL/L (ref 3.5–5.1)
PROT SERPL-MCNC: 8.2 G/DL (ref 6.4–8.2)
RBC # BLD AUTO: 5.04 X10(6)UL
SODIUM SERPL-SCNC: 136 MMOL/L (ref 136–145)
TRIGL SERPL-MCNC: 227 MG/DL (ref 30–149)
TSI SER-ACNC: 0.8 MIU/ML (ref 0.36–3.74)
VIT D+METAB SERPL-MCNC: 11.6 NG/ML (ref 30–100)
VLDLC SERPL CALC-MCNC: 41 MG/DL (ref 0–30)
WBC # BLD AUTO: 12.4 X10(3) UL (ref 4–11)

## 2024-06-01 PROCEDURE — 85025 COMPLETE CBC W/AUTO DIFF WBC: CPT

## 2024-06-01 PROCEDURE — 99214 OFFICE O/P EST MOD 30 MIN: CPT | Performed by: FAMILY MEDICINE

## 2024-06-01 PROCEDURE — 82306 VITAMIN D 25 HYDROXY: CPT

## 2024-06-01 PROCEDURE — 80061 LIPID PANEL: CPT

## 2024-06-01 PROCEDURE — 84443 ASSAY THYROID STIM HORMONE: CPT

## 2024-06-01 PROCEDURE — 83036 HEMOGLOBIN GLYCOSYLATED A1C: CPT

## 2024-06-01 PROCEDURE — 82570 ASSAY OF URINE CREATININE: CPT

## 2024-06-01 PROCEDURE — 36415 COLL VENOUS BLD VENIPUNCTURE: CPT

## 2024-06-01 PROCEDURE — 80053 COMPREHEN METABOLIC PANEL: CPT

## 2024-06-01 PROCEDURE — 82043 UR ALBUMIN QUANTITATIVE: CPT

## 2024-06-01 RX ORDER — ATORVASTATIN CALCIUM 10 MG/1
10 TABLET, FILM COATED ORAL DAILY
Qty: 90 TABLET | Refills: 3 | Status: SHIPPED | OUTPATIENT
Start: 2024-06-01 | End: 2025-05-27

## 2024-06-01 NOTE — PROGRESS NOTES
had concerns including Diabetes (6 months follow up ).   Yvonne Girardsus Alanis Reyes is a 43 year old female who presents for recheck of her diabetes.  Subjective:   Much improving.  Recently she is switched from Trulicity to Rybelsus 7 mg.  She has been having a lot of abdominal symptoms but A1c has dropped to 6.9% which is the best have seen her and she is lost another 5 pounds since last visit.  Last A1c value was 6.9% done 6/1/2024.     Health Maintenance Due   Topic Date Due    Diabetes Care Dilated Eye Exam  Never done    Colorectal Cancer Screening  Never done    Annual Physical  Never done    Pneumococcal Vaccine: Birth to 64yrs (1 of 2 - PCV) Never done    Pap Smear  Never done    COVID-19 Vaccine (4 - 2023-24 season) 09/01/2023    Annual Depression Screening  01/01/2024    Mammogram  08/24/2024        Objective:    Labs, meds and PMSFHx reviewed, see Reviewed tab in Encounter     Wt Readings from Last 3 Encounters:   06/01/24 188 lb 9.6 oz (85.5 kg)   03/12/24 193 lb (87.5 kg)   11/27/23 191 lb 6.4 oz (86.8 kg)     BP Readings from Last 3 Encounters:   06/01/24 128/84   03/12/24 152/72   11/27/23 138/80         HPI     Review of Systems   Constitutional: Negative.  Negative for activity change, appetite change, chills and fever.   HENT: Negative.     Eyes: Negative.    Respiratory: Negative.  Negative for shortness of breath.    Cardiovascular: Negative.  Negative for chest pain and palpitations.   Gastrointestinal: Negative.  Negative for abdominal pain.   Genitourinary: Negative.  Negative for dysuria.   Musculoskeletal:  Negative for arthralgias.   Skin: Negative.  Negative for rash.   Allergic/Immunologic: Negative.    Neurological: Negative.         /84   Pulse 76   Resp 14   Ht 5' 5\" (1.651 m)   Wt 188 lb 9.6 oz (85.5 kg)   BMI 31.38 kg/m²  Estimated body mass index is 31.38 kg/m² as calculated from the following:    Height as of this encounter: 5' 5\" (1.651 m).    Weight as of this  encounter: 188 lb 9.6 oz (85.5 kg).   Physical Exam  Vitals and nursing note reviewed.   Constitutional:       General: She is not in acute distress.     Appearance: Normal appearance.   HENT:      Head: Normocephalic.   Cardiovascular:      Rate and Rhythm: Normal rate and regular rhythm.      Pulses:           Posterior tibial pulses are 2+ on the right side and 2+ on the left side.      Heart sounds: Normal heart sounds. No murmur heard.  Pulmonary:      Effort: Pulmonary effort is normal. No respiratory distress.      Breath sounds: Normal breath sounds. No wheezing.   Abdominal:      General: Bowel sounds are normal.      Palpations: Abdomen is soft.      Tenderness: There is no abdominal tenderness.   Musculoskeletal:      Cervical back: Normal range of motion.      Right lower leg: No edema.      Left lower leg: No edema.      Right foot: No deformity.      Left foot: No deformity.   Feet:      Right foot:      Protective Sensation: 6 sites tested.  6 sites sensed.      Skin integrity: Skin integrity normal. No ulcer.      Left foot:      Protective Sensation: 6 sites tested.  6 sites sensed.      Skin integrity: Skin integrity normal. No ulcer.   Skin:     General: Skin is warm and dry.      Findings: No rash.   Neurological:      Mental Status: She is alert and oriented to person, place, and time.   Psychiatric:         Mood and Affect: Mood normal.         Behavior: Behavior normal.         Thought Content: Thought content normal.         Judgment: Judgment normal.        Nursing note and vitals reviewed.       Assessment & Plan:    The patient indicates understanding of these issues and agrees to the plan.    1. Controlled type 2 diabetes mellitus with hyperglycemia, with long-term current use of insulin (HCC) (Primary)  Overview:  Metformin  mg - Take 2 pills twice daily with meals  Pioglitazone 30  Soliqua 15 (started 2/22/2023)  Novolog vial- 15 units before meals 3x daily  A1c 8.6% 5/2020 at  [facility]  Assessment & Plan:  A1c is 7.7 done 3/12/2024 which shows hyperglycemia and borderline control, maintain vigilance and increase activity and medications as listed.  Diabetic medications include Insulin Lispro, 1 Unit Dial, 100 UNIT/ML Subcutaneous Solution Pen-injector [[id number]], empagliflozin (JARDIANCE) 10 MG Oral Tab [[id number]], insulin glargine (LANTUS SOLOSTAR) 100 UNIT/ML Subcutaneous Solution Pen-injector [[id number]], metFORMIN  MG Oral Tablet 24 Hr [[id number]]. Better control. DM Meds: empagliflozin Tabs - 10 MG; Insulin Lispro (1 Unit Dial) Sopn - 100 UNIT/ML; Lantus SoloStar Sopn - 100 UNIT/ML; metFORMIN ER Tb24 - 500 MG    Orders:  -     Comp Metabolic Panel (14); Future; Expected date: 06/01/2024  -     Lipid Panel; Future; Expected date: 06/01/2024  -     Hemoglobin A1C; Future; Expected date: 06/01/2024  -     Microalb/Creat Ratio, Random Urine; Future; Expected date: 06/01/2024  2. Essential hypertension  Overview:  Losartan 100, , metoprolol 100 ER, amlodipine 10, and spironolactone 25 mg started 3/2022  Assessment & Plan:  BP shows poor control with last BP of 152/72. Lifestyle changes, diet, exercise and weight loss were counseled. Medication changes as listed.   Last K was 3.9 done on 11/5/2023.  Last Cr was 1.08 done on 11/5/2023.  Last eGFR was 65 on 11/5/2023.  BP Meds: amLODIPine Tabs - 10 MG; hydroCHLOROthiazide Tabs - 25 MG; losartan Tabs - 100 MG; metoprolol succinate ER Tb24 - 100 MG; spironolactone Tabs - 50 MG    Orders:  -     CBC With Differential With Platelet; Future; Expected date: 06/01/2024  3. Mixed hyperlipidemia  Overview:  LDL 98, trigs 279  Assessment & Plan:  Cholesterol shows Good control. Long term heart-healthy diet and lifestyle discussed and encouraged to reduce risk of cardiovascular disease.  Cholesterol: 164, done on 11/5/2023.  HDL Cholesterol: 35, done on 11/5/2023.  TriGlycerides 224, done on 11/5/2023.  LDL Cholesterol: 91, done on 11/5/2023.   No  current Cholesterol medication.     Orders:  -     Comp Metabolic Panel (14); Future; Expected date: 06/01/2024  -     Lipid Panel; Future; Expected date: 06/01/2024  -     TSH W Reflex To Free T4; Future; Expected date: 06/01/2024  -     Atorvastatin Calcium; Take 1 tablet (10 mg total) by mouth daily.  Dispense: 90 tablet; Refill: 3  4. Vitamin D deficiency  -     Vitamin D; Future; Expected date: 06/01/2024  5. Type 2 diabetes mellitus with microalbuminuria, with long-term current use of insulin (HCC)  -     Empagliflozin; Take 1 tablet (10 mg total) by mouth daily.  Dispense: 90 tablet; Refill: 3  6. Proteinuria due to type 2 diabetes mellitus (HCC)  -     Empagliflozin; Take 1 tablet (10 mg total) by mouth daily.  Dispense: 90 tablet; Refill: 3     Good improvement overall, continue to watch vitamin D and arrange for blood work that was ordered today and most of it came back before visit was fully documented so updated numbers are available here.  She is doing quite a bit better.  Okay to try Rybelsus half tablet in the intermittent it future and see how she does possibly replacing it with 3 mg dose with next visit.    I have discontinued Teresa D. Alanis Reyes's TRUEplus 5-Bevel Pen Needles. I am also having her start on atorvastatin. Additionally, I am having her maintain her potassium citrate, Insulin Syringe-Needle U-100, amLODIPine, Insulin Pen Needle, OneTouch Verio, OneTouch Delica Lancets 33G, OneTouch Verio IQ System, hydroCHLOROthiazide, losartan, spironolactone, metoprolol succinate ER, Insulin Lispro (1 Unit Dial), metFORMIN ER, Lantus SoloStar, and empagliflozin.     Return in about 4 months (around 10/1/2024) for recheck.

## 2024-06-25 ENCOUNTER — OFFICE VISIT (OUTPATIENT)
Dept: ENDOCRINOLOGY CLINIC | Facility: CLINIC | Age: 44
End: 2024-06-25

## 2024-06-25 ENCOUNTER — NURSE ONLY (OUTPATIENT)
Dept: INTERNAL MEDICINE CLINIC | Facility: CLINIC | Age: 44
End: 2024-06-25

## 2024-06-25 VITALS
WEIGHT: 187 LBS | SYSTOLIC BLOOD PRESSURE: 155 MMHG | HEART RATE: 88 BPM | BODY MASS INDEX: 31 KG/M2 | OXYGEN SATURATION: 98 % | DIASTOLIC BLOOD PRESSURE: 76 MMHG | RESPIRATION RATE: 16 BRPM

## 2024-06-25 DIAGNOSIS — R80.9 TYPE 2 DIABETES MELLITUS WITH MICROALBUMINURIA, WITH LONG-TERM CURRENT USE OF INSULIN (HCC): ICD-10-CM

## 2024-06-25 DIAGNOSIS — Z79.4 TYPE 2 DIABETES MELLITUS WITH MICROALBUMINURIA, WITH LONG-TERM CURRENT USE OF INSULIN (HCC): ICD-10-CM

## 2024-06-25 DIAGNOSIS — E11.29 TYPE 2 DIABETES MELLITUS WITH MICROALBUMINURIA, WITH LONG-TERM CURRENT USE OF INSULIN (HCC): ICD-10-CM

## 2024-06-25 DIAGNOSIS — E11.22 TYPE 2 DIABETES MELLITUS WITH STAGE 3A CHRONIC KIDNEY DISEASE, WITH LONG-TERM CURRENT USE OF INSULIN (HCC): Primary | ICD-10-CM

## 2024-06-25 DIAGNOSIS — N18.31 TYPE 2 DIABETES MELLITUS WITH STAGE 3A CHRONIC KIDNEY DISEASE, WITH LONG-TERM CURRENT USE OF INSULIN (HCC): Primary | ICD-10-CM

## 2024-06-25 DIAGNOSIS — Z79.4 TYPE 2 DIABETES MELLITUS WITH STAGE 3A CHRONIC KIDNEY DISEASE, WITH LONG-TERM CURRENT USE OF INSULIN (HCC): Primary | ICD-10-CM

## 2024-06-25 PROCEDURE — 92229 IMG RTA DETC/MNTR DS POC ALY: CPT

## 2024-06-25 PROCEDURE — 99214 OFFICE O/P EST MOD 30 MIN: CPT | Performed by: NURSE PRACTITIONER

## 2024-06-25 RX ORDER — ORAL SEMAGLUTIDE 14 MG/1
14 TABLET ORAL DAILY
Qty: 30 TABLET | Refills: 5 | Status: SHIPPED | OUTPATIENT
Start: 2024-06-25 | End: 2024-07-25

## 2024-06-25 NOTE — PROGRESS NOTES
Chief Complaint   Patient presents with    Diabetes     Follow up- [id number]     HPI:   Teresa De Jesus Alanis Reyes is a 43 year old female who presents for a follow up visit for management of diabetes.  used throughout visit,  # [id number].  Last A1c value was 6.9% done 2024. Since last visit diabetes management has been improving. Pt states she has some transient nausea with rybelsus but so far tolerable.     Diabetes history:  Type: 2  Onset: ~   Pt has not been admitted to the hospital for blood sugars.   Pt does not have a hx of pancreatitis.     Current DM regimen:  Rybelsus 7 mg daily   Metformin  mg - Take 2 tabs in am and 1 tab in pm  Jardiance 10 m tab daily in am   Lantus: Inject 40 units twice daily    Humalo-25 units before meals 3x daily plus follow the scale:   If glucose is 141-180 mg/dl take extra 1 unit   If glucose is 181-220 mg/dl take extra 2 unit   If glucose is 221-260 mg/dl take extra 3 unit   If glucose is 261-300 mg/dl take extra 4 unit   If glucose is 301-340 mg/dl take extra 5 unit     Previous DM therapies:  Toujeo, Tresiba - ins coverage   Synjardy - ins coverage  repaglinide - started on prandial insulin  Basaglar - insurance prefers levemir or lantus  Trulicity - med shortage    Complications/Co-morbidities:   Proteinuria persistent  CKD stage 3      Modifying factors:  Medication adherence: yes  Recent illness/steroids: no       Overall glucose control:   HGBA1C:    Lab Results   Component Value Date    A1C 6.9 (H) 2024    A1C 7.7 (A) 2024    A1C 7.4 (H) 2023     (H) 2024            Past History:   She  has a past medical history of Calculus of kidney, Diabetes (HCC), High blood pressure, and History of blood transfusion (2020).   Her family history includes Diabetes in her mother and sister.   She  reports that she has never smoked. She has never used smokeless tobacco. She reports that she does not  drink alcohol and does not use drugs.     She is allergic to peanut butter flavor.     Current Outpatient Medications on File Prior to Visit   Medication Sig    empagliflozin (JARDIANCE) 10 MG Oral Tab Take 1 tablet (10 mg total) by mouth daily.    atorvastatin (LIPITOR) 10 MG Oral Tab Take 1 tablet (10 mg total) by mouth daily.    metFORMIN  MG Oral Tablet 24 Hr Take 2 tablets (1,000 mg total) by mouth daily with breakfast AND 1 tablet (500 mg total) daily with dinner.    insulin glargine (LANTUS SOLOSTAR) 100 UNIT/ML Subcutaneous Solution Pen-injector Inject 40 Units into the skin 2 (two) times daily. Inject up to 80 units daily    Insulin Lispro, 1 Unit Dial, 100 UNIT/ML Subcutaneous Solution Pen-injector USE AS DIRECTED BEFORE MEALS UP TO TOTAL DAILY DOSE  UNITS    hydroCHLOROthiazide 25 MG Oral Tab Take 1 tablet (25 mg total) by mouth daily.    losartan 100 MG Oral Tab Take 1 tablet (100 mg total) by mouth daily.    spironolactone 50 MG Oral Tab Take 1 tablet (50 mg total) by mouth daily.    metoprolol succinate  MG Oral Tablet 24 Hr Take 1 tablet (100 mg total) by mouth daily.    Glucose Blood (ONETOUCH VERIO) In Vitro Strip Check glucose at least 4x daily.    OneTouch Delica Lancets 33G Does not apply Misc Check glucose 4x daily before insulin injections.    Insulin Pen Needle 32G X 4 MM Does not apply Misc Use for insulin QID    AMLODIPINE 10 MG Oral Tab TAKE 1 TABLET(10 MG) BY MOUTH DAILY    Insulin Syringe-Needle U-100 31G X 15/64\" 1 ML Does not apply Misc 1 Syringe 3 (three) times daily before meals.    potassium citrate 10 MEQ (1080 MG) Oral Tab CR Take 2 tablets (20 mEq total) by mouth 2 (two) times a day.     No current facility-administered medications on file prior to visit.       BP Readings from Last 3 Encounters:   06/25/24 155/76   06/01/24 128/84   03/12/24 152/72     Wt Readings from Last 3 Encounters:   06/25/24 187 lb (84.8 kg)   06/01/24 188 lb 9.6 oz (85.5 kg)   03/12/24  193 lb (87.5 kg)     CMP  (most recent labs)   Lab Results   Component Value Date/Time    GLU 73 06/01/2024 11:13 AM    BUN 28 (H) 06/01/2024 11:13 AM    CREATSERUM 1.35 (H) 06/01/2024 11:13 AM    GFRNAA 60 04/23/2022 06:10 AM    GFRAA 70 04/23/2022 06:10 AM    EGFRCR 49 (L) 06/01/2024 11:13 AM    CA 9.4 06/01/2024 11:13 AM    ALKPHO 75 06/01/2024 11:13 AM    AST 9 (L) 06/01/2024 11:13 AM    ALT 18 06/01/2024 11:13 AM    BILT 0.4 06/01/2024 11:13 AM    TP 8.2 06/01/2024 11:13 AM    ALB 3.6 06/01/2024 11:13 AM     06/01/2024 11:13 AM    K 4.1 06/01/2024 11:13 AM     06/01/2024 11:13 AM    CO2 28.0 06/01/2024 11:13 AM            Lipid panel:  Cholesterol: 213, done on 6/1/2024.  HDL Cholesterol: 45, done on 6/1/2024.  LDL Cholesterol: 128, done on 6/1/2024.  TriGlycerides 227, done on 6/1/2024.        Thyroid  (most recent labs)   Lab Results   Component Value Date/Time    TSH 0.797 06/01/2024 11:13 AM        Micro Albumen/Creatinine:    Lab Results   Component Value Date    MICROALBCREA 2,306.5 (H) 06/01/2024    MICROALBCREA 1,710.0 (H) 11/05/2023    MICROALBCREA 1,630.0 (H) 02/12/2023          Lab results reviewed with patient.    REVIEW OF SYSTEMS:   Review of Systems  GENERAL HEALTH: feels fatigued  SKIN: denies any unusual skin lesions or rashes, + lipohypertrophy of the abdomen  EYES: reports occasional vision changes  RESPIRATORY: denies shortness of breath with exertion  CARDIOVASCULAR: denies chest pain on exertion  GI: denies abdominal pain, N/V/D  : Denies vaginal itching or discharge  NEURO: denies numbness and tingling to extremities, denies HA   ENDO: denies polyuria, polyphagia, polydipsia     EXAM:   /76   Pulse 88   Resp 16   Wt 187 lb (84.8 kg)   SpO2 98%   BMI 31.12 kg/m²  Estimated body mass index is 31.12 kg/m² as calculated from the following:    Height as of 6/1/24: 5' 5\" (1.651 m).    Weight as of this encounter: 187 lb (84.8 kg).   Physical Exam  Pulmonary: Pulmonary  effort is normal  Neurologic: Alert and oriented   Psychiatric: Normal mood and affect      ASSESSMENT AND PLAN:   As for her Diabetes, it is well controlled. Discussed increasing rybelsus to lower insulin dosing and help off set weight gain. Pt agreeable. Has some nausea on 7 mg dose so call if unable to tolerate and will change back to previous dosing.   BP remains elevated and urine protein increasing. Advised pt once again to follow up with nephrology, new referral placed.   Medications:  Continue:  Metformin  mg - Take 2 tabs in am and 1 tab in pm  Jardiance 10 m tab daily in am   Change:  Rybelsus 7 mg --> increase to 14 mg daily   Lantus: Inject 40 --> reduce to 35 units twice daily    Humalo-25 --> reduce to 15-20 units before meals 3x daily plus follow the scale:   If glucose is 141-180 mg/dl take extra 1 unit   If glucose is 181-220 mg/dl take extra 2 unit   If glucose is 221-260 mg/dl take extra 3 unit   If glucose is 261-300 mg/dl take extra 4 unit   If glucose is 301-340 mg/dl take extra 5 unit         Recommendations:   BGM 3-4x daily, targets reviewed and provided in AVS (not interested in CGM)  lose weight by increased dietary compliance and exercise   Diabetic eye exam performed in office, + DR referral to Dr. Edwards placed  check feet daily      Cardiovascular:  The 10-year ASCVD risk score (Josef WIGGINS, et al., 2019) is: 4.7%    Values used to calculate the score:      Age: 43 years      Sex: Female      Is Non- : No      Diabetic: Yes      Tobacco smoker: No      Systolic Blood Pressure: 155 mmHg      Is BP treated: Yes      HDL Cholesterol: 45 mg/dL      Total Cholesterol: 213 mg/dL     As for her hypertension, Blood Pressure is needs improvement. Per pt readings are 120's/80's at home and has white coat syndrome in office. Pt is on ace/arb as well as multiple agents for BP.   PLAN: will continue present medications, reviewed diet, exercise and weight  control, followed by PC, I have recommended to pt she see nephrology as well.      As for her cholesterol, Lipids are needs improvement. Pt is on high intensity statin.   PLAN: will continue present medications, reviewed diet, exercise and weight control, and labs as ordered.     Patient is not on aspirin therapy due to ascvd risk <10%.     DM Health Maintenance  Nephropathy screening: continue ace /arb rx, with persistent elevated proteinuria I have referred pt to nephrology (pt has not scheduled)  Last dilated eye exam: Last Dilated Eye Exam: 24   Exam shows retinopathy? Eye Exam shows Diabetic Retinopathy?: Yes    Last diabetic foot exam: Last Foot Exam: 24      Date of last PHQ-2 depression screen: PHQ-2 - Date of last depression screenin2023    Patient  reports that she has never smoked. She has never used smokeless tobacco.  When is flu vaccine due? No recommendations at this time  When is pneumonia vaccine due? Pneumococcal Vaccination(1 of 2 - PCV) Never done  Dentist : recommend every 6m     The patient indicates understanding of these issues and agrees to the plan.  Refills sent at time of office visit.    Diagnoses and all orders for this visit:    Type 2 diabetes mellitus with stage 3a chronic kidney disease, with long-term current use of insulin (HCC)  -     Diabetic Retinopathy Exam  OU - Both Eyes; Future  -     Nephrology Referral - In Network  -     Semaglutide (RYBELSUS) 14 MG Oral Tab; Take 14 mg by mouth daily.  -     Endocrine Referral - In Network  -     Cancel: Refer to Opthalmology  -     Refer to Opthalmology             Do the chronicity and potential for complications of disease pt will need ongoing monitoring.   Return in about 3 months (around 2024). With endocrinology     Spent 30 min obtaining patient history, evaluating patient, reviewing blood glucose trends, discussing treatment options, lifestyle modifications and completing documentation -this time does  not including sensor interpretation time if applicable.   The risks and benefits of my recommendations, as well as other treatment options were discussed with the patient today. Questions were also answered to the best of my knowledge.    Lila MERAZ

## 2024-06-25 NOTE — PATIENT INSTRUCTIONS
El último valor de A1c fue del 6,9 % realizado el 2024.         Medicamentos:  Continuar:  Metformina  mg: tome 2 tabletas por la mañana y 1 tableta por la tarde  Jardiance 10 m pestaña al día por la mañana     Cambiar:  Rybelsus 7 mg --> aumentar a 14 mg al día   Lantus: Inyectar 40 --> reducir a 35 unidades dos veces al día    Humalo-25 --> reducir a 15-20 unidades antes de las comidas 3 veces al día y seguir la escala:   Si la glucosa es de 141-180 mg/dl, tome 1 unidad adicional.   Si la glucosa es de 181-220 mg/dl, tome 2 unidades adicionales.   Si la glucosa es de 221-260 mg/dl, tome 3 unidades adicionales.   Si la glucosa es de 261-300 mg/dl, tome 4 unidades adicionales.   Si la glucosa es de 301-340 mg/dl, tome 5 unidades adicionales.     1. Para determinar cualquier patrón en melo nivel de azúcar en [place], deberá medir melo nivel de azúcar en [place] 3 o 4 veces al día.   2. Programe candy italo con melo médico especialista en riñones en relación con melo presión arterial aún elevada y candy cantidad significativa de proteínas en la orina.   3. Llame si tiene alguna inquietud antes de la próxima visita.   4. Programar próxima visita a endocrinología en 3 meses.       Regreso en unos 3 meses (alrededor del 2024).          Seguimos trabajando juntos para mantener david niveles de azúcar en la [place] dentro de los objetivos.    El objetivo principal del tratamiento de la diabetes es evitar que el nivel de azúcar en la [place] suba demasiado. Medimos david tendencias generales de azúcar en la [place] con candy prueba de hemoglobina A1C. (también llamado A1C) Para la mayoría de las personas, el objetivo es menos del 7,0 %, jalen a veces hacemos excepciones según la edad, el historial de marvin y otros factores.  Mantener un A1C por debajo del 7% ayuda a prevenir problemas de marvin relacionados con la diabetes.  Si melo A1C sube demasiado, entonces debemos hablar sobre cambiar melo tratamiento actual para  la diabetes.    Es importante adina todos david medicamentos según lo prescrito.  Además, llámeme si tiene algún problema con preguntas sobre medicamentos, efectos secundarios, preguntas sobre dosis o problemas con las tendencias de azúcar en la [place] ANTES DE CAMBIAR O SUSPENDER CUALQUIER MEDICAMENTO.    Prueba de azúcar en la [place]:  Recuerde llevar melo medidor de glucosa o registro de azúcar en la [place] a cada italo en el centro de diabetes.  [place] me permite hacer ajustes de forma garces a melo plan de diabetes.  Horarios recomendados para realizar la prueba: antes del desayuno (en ayunas) y luego alternar la prueba de azúcar en [place] 2 horas después de las comidas.    Metas de azúcar en la [place]:  Antes del desayuno:  (preferiblemente menos de 110)  2 horas Después de las comidas: menos de 180 (preferiblemente menos de 150)  Pregunte por niveles persistentes de azúcar en la [place] por debajo de 75 o por encima de 200.  Los niveles de azúcar en la [place] por encima de 200 no son aceptables para alcanzar melo objetivo de mejorar la diabetes  Esté atento a los niveles bajos de azúcar en la [place]: (menos de 70)      Síntomas de niveles bajos de azúcar en la [place]:  Temblores o mareos  Piel fría o húmeda  Estar hambriento  Dolor de ewa  Nerviosismo  Un latido del corazón hattie y rápido  Punto débil  Confusión o irritabilidad  Visión borrosa  Tener pesadillas o despertarse confundido o sudando  Entumecimiento u hormigueo en los labios o la lengua    Plan de acción para el tratamiento de la hipoglucemia  1. Controle melo nivel de glucosa en [place] para asegurarse de que esté bajo. No siempre se pueden seguir los síntomas. En leila de lon, trate melo nivel bajo de glucosa en [place] de todos modos.  2. Adina 15 gramos de carbohidratos (hidratos de carbono). Aquí hay algunas opciones:  4 onzas. jugo de frutas regular  3-4 tabletas de glucosa  6 onzas. refresco normal  7-8 gominolas  3. Vuelva a controlar melo glucosa  en [place] después de 10 a 15 minutos. Si la glucemia sigue baja (menos de 70 mg/dl) repetir el tratamiento (paso 2).  4. Si falta más de candy hora para melo próxima comida, tome un refrigerio pequeño.  5. Si no está seguro de cuál es la causa de melo nivel bajo de glucosa en [place], llame a melo proveedor de atención médica.  6. Controla siempre tu nivel de glucosa en [place] antes de conducir    Aliza,  Lila Reeves APRN  [phone]  ____________________________________________________________________________________________________________________________________________________    Last A1c value was 6.9% done 2024.         Medications:  Continue:  Metformin  mg - Take 2 tabs in am and 1 tab in pm  Jardiance 10 m tab daily in am     Change:  Rybelsus 7 mg --> increase to 14 mg daily   Lantus: Inject 40 --> reduce to 35 units twice daily    Humalo-25 --> reduce to 15-20 units before meals 3x daily plus follow the scale:   If glucose is 141-180 mg/dl take extra 1 unit   If glucose is 181-220 mg/dl take extra 2 unit   If glucose is 221-260 mg/dl take extra 3 unit   If glucose is 261-300 mg/dl take extra 4 unit   If glucose is 301-340 mg/dl take extra 5 unit     In order to determine any pattern in your blood sugar level, you will need to measure your blood sugar 3-4 times a day.   Please schedule with kidney Dr regarding your BP still being elevated and significant protein in your urine.   Please call with any concerns before next visit.   Schedule next visit with endocrinology in 3 months.       Return in about 3 months (around 2024).         We continue to work together to keep your blood sugar levels on target.    The main goal of treating diabetes is to keep your blood sugar from getting too high. We measure your overall blood sugar trends with a hemoglobin A1C test. (also called A1C) For most people, the goal is less than 7.0%, but we sometimes make exceptions based on age, health history,  and other factors.  Keeping an A1C below 7% helps prevent diabetes-related health problems.  If your A1C gets too high, then we need to talk about changing your current diabetes treatment.    It is important to take all of your medications as prescribed.  Also, please call me if you have any problems with questions about medications, side effects, questions about dosage, or problems with blood sugar trends BEFORE YOU SWITCH OR STOP ANY MEDICINES.    Blood sugar test:  Remember to bring your glucose meter or blood sugar log to every appointment at the diabetes center.  This allows me to safely make adjustments to your diabetes plan.  Recommended times to perform the test: before breakfast (fasting) and then alternate the blood sugar test 2 hours after meals.    Blood sugar goals:  Before breakfast:  (preferably less than 110)  2 hours After meals: less than 180 (preferably less than 150)  Ask for persistent blood sugar levels below 75 or above 200.  Blood sugar levels above 200 are not acceptable to reach your goal of improving diabetes  Watch for low blood sugar levels: (less than 70)      Symptoms of low blood sugar levels:  Tremors or dizziness  Clammy or clammy skin  To be hungry  Headache  Nervousness  A strong and fast heartbeat  Soft spot  Confusion or irritability  Blurred vision  Having nightmares or waking up confused or sweating  Numbness or tingling in the lips or tongue    Action plan for the treatment of hypoglycemia  1. Check your blood glucose to make sure it is low. You can't always follow the symptoms. When in doubt, treat your low blood glucose anyway.  2. Take 15 grams of carbohydrates (carbohydrates). Here are some options:  4 oz. regular fruit juice  3-4 glucose tablets  6 oz. regular soda  7-8 jelly beans  3. Check your blood glucose again after 10-15 minutes. If blood glucose is still low (less than 70 mg / dl) repeat the treatment (step 2).  4. If your next meal is more than an hour  away, have a small snack.  5. If you are not sure what is causing your low blood glucose, call your healthcare provider.  6. Always check your blood glucose before driving    Thank you,  Lila MERAZ  [phone]

## 2024-06-26 ENCOUNTER — TELEPHONE (OUTPATIENT)
Dept: ENDOCRINOLOGY CLINIC | Facility: CLINIC | Age: 44
End: 2024-06-26

## 2024-06-26 NOTE — TELEPHONE ENCOUNTER
Fax received from [employer] for PRIOR AUTHORIZATION Rybelsus 14 mg.    Approved effective-3/28/2024 and ends 6/26/2025.    Pharmacy made aware.

## 2024-08-01 ENCOUNTER — TELEPHONE (OUTPATIENT)
Dept: FAMILY MEDICINE CLINIC | Facility: CLINIC | Age: 44
End: 2024-08-01

## 2024-08-01 RX ORDER — ORAL SEMAGLUTIDE 7 MG/1
1 TABLET ORAL DAILY
COMMUNITY
Start: 2024-06-07

## 2024-08-01 NOTE — TELEPHONE ENCOUNTER
Called and using language line Carlos Eduardo [phone] notified patient the her medication was ready to be picked up at her pharmacy and she should continue to follow up with the diabetes education department.

## 2024-08-01 NOTE — TELEPHONE ENCOUNTER
Patient call requesting medication refill.  Prescribe by another provider but Dr. Reeves is not working at the office any more.    Rybelsus 14 mg     [id number] - [place], [place] - [address] AT [facility] [place], [phone], [phone]   [address] [place] [address]   Phone: [phone] Fax: [phone]   Hours: Not open 24 hours

## 2024-08-05 ENCOUNTER — TELEPHONE (OUTPATIENT)
Dept: ENDOCRINOLOGY CLINIC | Facility: CLINIC | Age: 44
End: 2024-08-05

## 2024-08-05 NOTE — TELEPHONE ENCOUNTER
Pt called in [facility] T[facility]     Contacted pt back stated they were not able to get refill for Rybelsus states Rx is coming out too $1000 pt is on Medicaid [facility] shouldn't have to pay for meds- pt does have 4-5 pill lefts will contact insurance to see what is happening. Pt does have Rx for 6/25/25 with 5 refills - did contact pharmacy first and they stated Rx would not go through. Spoke to prime for insurance reviewed and PA approval was still there was then transferred to clinical review department to see what they can do to help. Transferred to [facility] stated new guideline criteria for pt to meet they stated before it was a step therapy and now it is just a PA approval needed again. Will resubmit pt       Pt does have Rx for 6/25/25 with 5 refills - did contact pharmacy first and they stated Rx would not go through.       RTC   Do the chronicity and potential for complications of disease pt will need ongoing monitoring.   Return in about 3 months (around 9/25/2024). With endocrinology

## 2024-08-06 NOTE — TELEPHONE ENCOUNTER
Received approval for rybelsus called pharamcy and is going through did schedule pt for appt with endo as well   Future Appointments   Date Time Provider Department Center   10/7/2024  5:15 PM Emeka Bryson MD [facility] [place]   11/4/2024  8:45 AM Judith Rivera DO [id number] [facility] [place]     Last A1c value was 6.9% done 6/1/2024.

## 2024-08-06 NOTE — TELEPHONE ENCOUNTER
Received PA form for GLP 1 filled attached OV notes pt face sheet and insurance cards faxed too [phone]     Confirmed

## 2024-08-29 DIAGNOSIS — E11.29 TYPE 2 DIABETES MELLITUS WITH MICROALBUMINURIA, WITH LONG-TERM CURRENT USE OF INSULIN (HCC): ICD-10-CM

## 2024-08-29 DIAGNOSIS — Z79.4 TYPE 2 DIABETES MELLITUS WITH MICROALBUMINURIA, WITH LONG-TERM CURRENT USE OF INSULIN (HCC): ICD-10-CM

## 2024-08-29 DIAGNOSIS — R80.9 TYPE 2 DIABETES MELLITUS WITH MICROALBUMINURIA, WITH LONG-TERM CURRENT USE OF INSULIN (HCC): ICD-10-CM

## 2024-08-29 RX ORDER — INSULIN GLARGINE 100 [IU]/ML
40 INJECTION, SOLUTION SUBCUTANEOUS 2 TIMES DAILY
Qty: 45 ML | Refills: 0 | Status: SHIPPED | OUTPATIENT
Start: 2024-08-29

## 2024-08-29 NOTE — TELEPHONE ENCOUNTER
Received refill for lantus   Requested Prescriptions     Pending Prescriptions Disp Refills    insulin glargine (LANTUS SOLOSTAR) 100 UNIT/ML Subcutaneous Solution Pen-injector 36 mL 2     Sig: Inject 40 Units into the skin 2 (two) times daily. Inject up to 80 units daily     Future Appointments   Date Time Provider Department Center   10/7/2024  5:15 PM Emeka Bryson MD [facility] [place]   11/4/2024  8:45 AM Judith Rivera DO [id number] [facility] [place]     Last A1c value was 6.9% done 6/1/2024.  Refill 3/21/24 k.lama   LOV 6/25/24 roberth

## 2024-10-05 NOTE — ASSESSMENT & PLAN NOTE
Diabetes: On Insulin.   A1c is 6.9 done 6/1/2024 which shows Excellent control. Continue current meds and lifestyle modification.   DM Meds: empagliflozin Tabs - 10 MG; Insulin Lispro (1 Unit Dial) Sopn - 100 UNIT/ML; Lantus SoloStar Sopn - 100 UNIT/ML; metFORMIN ER Tb24 - 500 MG; Rybelsus Tabs - 7 MG      Diabetic Complications: Hyperglycemia: 7.7% 3/12/2024, GLU 73.  Hypertriglyceridemia: 227, 6/1/2024.  CKD 3a (GFR 49).    Diabetes is : improving with treatment Continue current treatment regimen. Reassess Diabetes in : in 3 months

## 2024-10-05 NOTE — ASSESSMENT & PLAN NOTE
Cholesterol shows Good control. Long term heart-healthy diet and lifestyle discussed and encouraged to reduce risk of cardiovascular disease.  6/1/2024: Cholesterol, Total 213 (H); HDL Cholesterol 45; Triglycerides 227 (H); LDL Cholesterol 128 (H)  Cholesterol Meds: atorvastatin Tabs - 10 MG  stable  Continue with current treatment plan

## 2024-10-05 NOTE — ASSESSMENT & PLAN NOTE
BP shows poor control with last BP of 155/76. Lifestyle changes, diet, exercise and weight loss were counseled. Medication changes as listed.   6/1/2024: Potassium 4.1; Creatinine 1.35 (H); eGFR-Cr 49 (L)  BP Meds: amLODIPine Tabs - 10 MG; hydroCHLOROthiazide Tabs - 25 MG; losartan Tabs - 100 MG; metoprolol succinate ER Tb24 - 100 MG; spironolactone Tabs - 50 MG   ACE/ARB Adherence Poor at 33% working on better control

## 2024-10-07 ENCOUNTER — OFFICE VISIT (OUTPATIENT)
Dept: FAMILY MEDICINE CLINIC | Facility: CLINIC | Age: 44
End: 2024-10-07
Payer: MEDICAID

## 2024-10-07 VITALS
HEIGHT: 65 IN | BODY MASS INDEX: 31.43 KG/M2 | SYSTOLIC BLOOD PRESSURE: 146 MMHG | HEART RATE: 78 BPM | DIASTOLIC BLOOD PRESSURE: 70 MMHG | WEIGHT: 188.63 LBS | RESPIRATION RATE: 16 BRPM

## 2024-10-07 DIAGNOSIS — E11.29 TYPE 2 DIABETES MELLITUS WITH MICROALBUMINURIA, WITH LONG-TERM CURRENT USE OF INSULIN (HCC): ICD-10-CM

## 2024-10-07 DIAGNOSIS — R80.9 TYPE 2 DIABETES MELLITUS WITH MICROALBUMINURIA, WITH LONG-TERM CURRENT USE OF INSULIN (HCC): ICD-10-CM

## 2024-10-07 DIAGNOSIS — E78.2 MIXED HYPERLIPIDEMIA: ICD-10-CM

## 2024-10-07 DIAGNOSIS — E11.65 CONTROLLED TYPE 2 DIABETES MELLITUS WITH HYPERGLYCEMIA, WITH LONG-TERM CURRENT USE OF INSULIN (HCC): Primary | ICD-10-CM

## 2024-10-07 DIAGNOSIS — I10 ESSENTIAL HYPERTENSION: ICD-10-CM

## 2024-10-07 DIAGNOSIS — Z79.4 TYPE 2 DIABETES MELLITUS WITH MICROALBUMINURIA, WITH LONG-TERM CURRENT USE OF INSULIN (HCC): ICD-10-CM

## 2024-10-07 DIAGNOSIS — Z79.4 CONTROLLED TYPE 2 DIABETES MELLITUS WITH HYPERGLYCEMIA, WITH LONG-TERM CURRENT USE OF INSULIN (HCC): Primary | ICD-10-CM

## 2024-10-07 LAB — HEMOGLOBIN A1C: 6.6 % (ref 4.3–5.6)

## 2024-10-07 RX ORDER — METFORMIN HCL 500 MG
TABLET, EXTENDED RELEASE 24 HR ORAL
Qty: 270 TABLET | Refills: 4 | Status: SHIPPED | OUTPATIENT
Start: 2024-10-07

## 2024-10-07 RX ORDER — INSULIN GLARGINE 100 [IU]/ML
40 INJECTION, SOLUTION SUBCUTANEOUS 2 TIMES DAILY
Qty: 45 ML | Refills: 11 | Status: SHIPPED | OUTPATIENT
Start: 2024-10-07

## 2024-10-07 NOTE — PROGRESS NOTES
Yvonne is a 43 year old female coming in for had concerns including Medication Request (Follow up /).    Subjective:   Medication Request       Dogin well.  Last A1c value was 6.6% done 10/7/2024.   Wt Readings from Last 5 Encounters:   10/07/24 188 lb 9.6 oz (85.5 kg)   06/25/24 187 lb (84.8 kg)   06/01/24 188 lb 9.6 oz (85.5 kg)   03/12/24 193 lb (87.5 kg)   11/27/23 191 lb 6.4 oz (86.8 kg)        Objective:   /70   Pulse 78   Resp 16   Ht 5' 5\" (1.651 m)   Wt 188 lb 9.6 oz (85.5 kg)   BMI 31.38 kg/m²  Body mass index is 31.38 kg/m².   Physical Exam  Vitals and nursing note reviewed.   Constitutional:       General: She is not in acute distress.     Appearance: Normal appearance.   HENT:      Head: Normocephalic.   Cardiovascular:      Rate and Rhythm: Normal rate and regular rhythm.      Pulses:           Posterior tibial pulses are 2+ on the right side and 2+ on the left side.      Heart sounds: Normal heart sounds. No murmur heard.  Pulmonary:      Effort: Pulmonary effort is normal. No respiratory distress.      Breath sounds: Normal breath sounds. No wheezing.   Abdominal:      General: Bowel sounds are normal.      Palpations: Abdomen is soft.      Tenderness: There is no abdominal tenderness.   Musculoskeletal:      Cervical back: Normal range of motion.      Right lower leg: No edema.      Left lower leg: No edema.      Right foot: No deformity.      Left foot: No deformity.   Feet:      Right foot:      Protective Sensation: 6 sites tested.  6 sites sensed.      Skin integrity: Skin integrity normal. No ulcer.      Left foot:      Protective Sensation: 6 sites tested.  6 sites sensed.      Skin integrity: Skin integrity normal. No ulcer.   Skin:     General: Skin is warm and dry.      Findings: No rash.   Neurological:      Mental Status: She is alert and oriented to person, place, and time.   Psychiatric:         Mood and Affect: Mood normal.         Behavior: Behavior normal.         Thought  Content: Thought content normal.         Judgment: Judgment normal.           Assessment & Plan:   1. Controlled type 2 diabetes mellitus with hyperglycemia, with long-term current use of insulin (HCC) (Primary)  Overview:  Metformin  mg - Take 2 pills twice daily with meals  Pioglitazone 30  Soliqua 15 (started 2/22/2023)  Novolog vial- 15 units before meals 3x daily  A1c 8.6% 5/2020 at [facility]  Assessment & Plan:  Diabetes: On Insulin.   A1c is 6.9 done 6/1/2024 which shows Excellent control. Continue current meds and lifestyle modification.   DM Meds: empagliflozin Tabs - 10 MG; Insulin Lispro (1 Unit Dial) Sopn - 100 UNIT/ML; Lantus SoloStar Sopn - 100 UNIT/ML; metFORMIN ER Tb24 - 500 MG; Rybelsus Tabs - 7 MG      Diabetic Complications: Hyperglycemia: 7.7% 3/12/2024, GLU 73.  Hypertriglyceridemia: 227, 6/1/2024.  CKD 3a (GFR 49).    Diabetes is : improving with treatment Continue current treatment regimen. Reassess Diabetes in : in 3 months   Orders:  -     POC Hemoglobin A1C  -     Comp Metabolic Panel (14); Future; Expected date: 01/05/2025  -     Lipid Panel; Future; Expected date: 01/05/2025  -     Hemoglobin A1C; Future; Expected date: 01/05/2025  -     Microalb/Creat Ratio, Random Urine; Future; Expected date: 01/05/2025  2. Mixed hyperlipidemia  Overview:  LDL 98, trigs 279  Assessment & Plan:  Cholesterol shows Good control. Long term heart-healthy diet and lifestyle discussed and encouraged to reduce risk of cardiovascular disease.  6/1/2024: Cholesterol, Total 213 (H); HDL Cholesterol 45; Triglycerides 227 (H); LDL Cholesterol 128 (H)  Cholesterol Meds: atorvastatin Tabs - 10 MG  stable  Continue with current treatment plan   3. Essential hypertension  Overview:  Losartan 100, , metoprolol 100 ER, amlodipine 10, and spironolactone 25 mg started 3/2022  Assessment & Plan:  BP shows poor control with last BP of 155/76. Lifestyle changes, diet, exercise and weight loss were counseled. Medication  changes as listed.   6/1/2024: Potassium 4.1; Creatinine 1.35 (H); eGFR-Cr 49 (L)  BP Meds: amLODIPine Tabs - 10 MG; hydroCHLOROthiazide Tabs - 25 MG; losartan Tabs - 100 MG; metoprolol succinate ER Tb24 - 100 MG; spironolactone Tabs - 50 MG   ACE/ARB Adherence Poor at 33% working on better control    4. Type 2 diabetes mellitus with microalbuminuria, with long-term current use of insulin (HCC)  -     Lantus SoloStar; Inject 40 Units into the skin 2 (two) times daily. Inject up to 80 units daily  Dispense: 45 mL; Refill: 11  -     metFORMIN HCl ER; Take 2 tablets (1,000 mg total) by mouth daily with breakfast AND 1 tablet (500 mg total) daily with dinner.  Dispense: 270 tablet; Refill: 4     Continued outstanding diabetic control after years of poor control.  Keep working on therapeutic lifestyle changes she is making, continue Rybelsus 7, offered to go up to 14 but she is worried about constipation and that she may be less diligent.  Reassess in 6 months instead of the usual 3 months.    I am having Yvonne CARRINGTON Alanis Reyes maintain her potassium citrate, Insulin Syringe-Needle U-100, amLODIPine, Insulin Pen Needle, OneTouch Verio, OneTouch Delica Lancets 33G, hydroCHLOROthiazide, losartan, spironolactone, metoprolol succinate ER, Insulin Lispro (1 Unit Dial), empagliflozin, atorvastatin, Rybelsus, Lantus SoloStar, and metFORMIN ER.       Return in about 6 months (around 4/7/2025) for diabetes follow up, Annual physical, OK to refill meds for 1 year when requested.

## 2024-11-04 ENCOUNTER — OFFICE VISIT (OUTPATIENT)
Facility: CLINIC | Age: 44
End: 2024-11-04
Payer: MEDICAID

## 2024-11-04 VITALS
WEIGHT: 186 LBS | OXYGEN SATURATION: 97 % | BODY MASS INDEX: 30.99 KG/M2 | SYSTOLIC BLOOD PRESSURE: 138 MMHG | DIASTOLIC BLOOD PRESSURE: 76 MMHG | HEART RATE: 80 BPM | HEIGHT: 65 IN

## 2024-11-04 DIAGNOSIS — Z79.4 TYPE 2 DIABETES MELLITUS WITH MICROALBUMINURIA, WITH LONG-TERM CURRENT USE OF INSULIN (HCC): ICD-10-CM

## 2024-11-04 DIAGNOSIS — R80.9 TYPE 2 DIABETES MELLITUS WITH MICROALBUMINURIA, WITH LONG-TERM CURRENT USE OF INSULIN (HCC): ICD-10-CM

## 2024-11-04 DIAGNOSIS — I10 ESSENTIAL HYPERTENSION: ICD-10-CM

## 2024-11-04 DIAGNOSIS — E11.29 TYPE 2 DIABETES MELLITUS WITH MICROALBUMINURIA, WITH LONG-TERM CURRENT USE OF INSULIN (HCC): ICD-10-CM

## 2024-11-04 DIAGNOSIS — Z79.4 CONTROLLED TYPE 2 DIABETES MELLITUS WITH HYPERGLYCEMIA, WITH LONG-TERM CURRENT USE OF INSULIN (HCC): Primary | ICD-10-CM

## 2024-11-04 DIAGNOSIS — E11.65 TYPE 2 DIABETES MELLITUS WITH HYPERGLYCEMIA, WITH LONG-TERM CURRENT USE OF INSULIN (HCC): ICD-10-CM

## 2024-11-04 DIAGNOSIS — E11.65 CONTROLLED TYPE 2 DIABETES MELLITUS WITH HYPERGLYCEMIA, WITH LONG-TERM CURRENT USE OF INSULIN (HCC): Primary | ICD-10-CM

## 2024-11-04 DIAGNOSIS — Z79.4 TYPE 2 DIABETES MELLITUS WITH HYPERGLYCEMIA, WITH LONG-TERM CURRENT USE OF INSULIN (HCC): ICD-10-CM

## 2024-11-04 DIAGNOSIS — E55.9 VITAMIN D DEFICIENCY: ICD-10-CM

## 2024-11-04 DIAGNOSIS — E78.2 MIXED HYPERLIPIDEMIA: ICD-10-CM

## 2024-11-04 PROCEDURE — 99205 OFFICE O/P NEW HI 60 MIN: CPT | Performed by: STUDENT IN AN ORGANIZED HEALTH CARE EDUCATION/TRAINING PROGRAM

## 2024-11-04 RX ORDER — INSULIN LISPRO 100 [IU]/ML
INJECTION, SOLUTION INTRAVENOUS; SUBCUTANEOUS
Qty: 90 ML | Refills: 2 | Status: SHIPPED | OUTPATIENT
Start: 2024-11-04

## 2024-11-04 RX ORDER — METFORMIN HYDROCHLORIDE 500 MG/1
TABLET, EXTENDED RELEASE ORAL
Qty: 270 TABLET | Refills: 4 | Status: SHIPPED | OUTPATIENT
Start: 2024-11-04

## 2024-11-04 RX ORDER — DEXAMETHASONE 1 MG
1 TABLET ORAL ONCE
Qty: 1 TABLET | Refills: 0 | Status: SHIPPED | OUTPATIENT
Start: 2024-11-04 | End: 2024-11-04

## 2024-11-04 RX ORDER — ERGOCALCIFEROL 1.25 MG/1
50000 CAPSULE, LIQUID FILLED ORAL WEEKLY
Qty: 12 CAPSULE | Refills: 0 | Status: SHIPPED | OUTPATIENT
Start: 2024-11-04 | End: 2025-02-02

## 2024-11-04 NOTE — PATIENT INSTRUCTIONS
Summary of today's visit:  Medication changes:    Continua melo medicaciones para melo diabetes  Ellen 1 mg de dexamethasone a 11 PM el noche antes de melo 8 AM prueba de katiuska para melo cortisol  Empezar 50,000 units cada semana de vitamina D      General follow up information:  Please let us know if you require any refills at least 1 week prior to your medication running out. If you do run out of medication, please call our office ASAP to request refills (do not wait until your follow up).   Please call our office if sugars at home are consistently greater than 250 or less than 70 for medication adjustment (do not wait until your follow up appointment).  The on-call pager is for emergencies only. If you are a type 1 diabetic and run out of insulin after business hours 8AM-4PM, you may call the on-call pager for a refill to a 24 hour pharmacy. All other refill requests should be requested during business hours.       Return Visit:   [x]  Physician in 6 weeks   []  Directions to 1st floor lab    [x]  Give blood sugar log  []  PAP paperwork for Ozempic/Jardiance (english/Somali)   []  Diabetes Education:    []  Carb Aware T2DM (60)   []  Carb count refresh T1DM (60)   [] CGM start _____________ (30)   []  Pump 101 (60)   []  Schedule with Cole for ___________ (60)   []  DMBO (60)      HOW TO TREAT LOW BLOOD SUGAR (Hypoglycemia)  Low blood sugar = Less than 70, or if you start to have symptoms  Symptoms: Shaking or trembling, fast heart rate, extreme hunger, sweating, confusion/difficulty concentrating, dizziness    How to treat a low blood sugar if you are able to eat/drink: The Rule of 15/15  If you are using a continuous glucose monitor that says you are low, but you do not have any symptoms, verify on fingerstick that your blood sugar is actually <70 before treating.   Eat 15 grams of carbs (see examples below)  Check your blood sugar after 15 minutes. If it’s still below your target range, have another serving.   Repeat  these steps until sugar is >90. Once it’s in range, if you're nervous about your sugar going low again, have a protein source (ie, a spoonful of peanut butter).   If you have a CGM, you want to look for how your arrow has changed. If you arrow is pointed up or sideways after 15 min, give your CGM more time OR check with a finger stick. Try not to eat more food until at least 15 min after the first BG check - otherwise you risk spiking your sugar too high.  If you are experiencing symptoms and you are unable to check your blood glucose for any reason, treat the hypoglycemia.  If someone has a low blood sugar and is unconscious: Don’t hesitate to call 911. Use emergency glucagon. If someone is unconscious and glucagon is not available or someone does not know how to use it, call 911 immediately.     To treat a low, carry with you easy, pre-portioned treatment for low blood sugars that are 15G of carbs:   - Children sized squeeze pouch applesauce (high fiber + carbs help prevent too high of a spike)  - Small children's sized juicebox- 15g carb --> 4oz juice box  - Glucose tablets from [employer], you can find them near diabetes supplies --> Note, you will need to eat 3-4 tablets to get to 15g of carbs  - Child sized fruit snack pack- look for one with 15 grams of total carbohydrate  - Choice of how to treat your low is important. Complex carbs, or foods that contain fats along with carbs (like chocolate) can slow the absorption of glucose and should NOT be used to treat an emergency low.

## 2024-11-04 NOTE — H&P
[facility] Note    Name: Teresa De Jesus Alanis Reyes    Date: 2024    Teresa De Jesus Alanis Reyes is a 44 year old female who presents for evaluation of T2DM management.     Chief complaint: Diabetes (Establish Care T2DM, Fingerstick /Last A1c 6.6 10/7/24/Eye Exam 2024/Foot Exam 10/2024)       Subjective:   Initial HPI consult - 2024 with : Luba #[id number]  DM hx:  -Diagnosed with diabetes in 2020 when she was admitted with hyperglycemia/acute pyelonephritis with hx of left staghorn calculi   -Started insuln: During above admission  -Family history- yes, sister with DM     -Re: potential DM medication contraindication (if positive, checkbox selected):  [] History of pancreatitis  [] Personal/fam hx of medullary thyroid cancer/MEN2  [] History of recent/frequent UTI/yeast infxn- denies; had 1 episode around  when she had kidney stones  [] Previous amputation related to diabetes    -Presence of associated DM complications (if positive, checkbox selected):  [] Macrovascular complications (CAD/CVA/PAD)  [] Neuropathy  [] Retinopathy  [] Nephropathy  [] HTN  [] Hyperlipidemia  [] Stroke/TIA  [] Gastroparesis  [] Wound, ulcer or amputations    - Lifestyle: Endorses compliance and states she watches her diet. Does note central adiposity but denies dark stretch marks on abdomen, easy bruising, or proximal muscle weakness. Has a hx of kidney stones. Is not on a calcium supplement. Drinks around 30 oz of water daily     - Steroid use: No    DM meds at first office visit:  empagliflozin Tabs - 10 MG; Insulin Lispro (1 Unit Dial) Sopn - 100 UNIT/ML 30u TID; Lantus SoloStar Sopn - 100 UNIT/ML 44u BID; metFORMIN ER Tb24 - 500 MG 2 tabs BID (she is only taking 2 tabs in the evening); Rybelsus 14 mg     Blood Glucose-finger sticks, does not have glucometer with her. Checking 2 times per day. Morning/fastin-133, bedtime: 160-170, episodes of hypoglycemia: No     Previously  trialed/failed DM meds: Toujeo/Tresiba (insurance coverage), Synjardy (insurance coverage), Repaglinide discontinued when prandial insulin started, basaglar (insurance coverage), Trulicity (had constipation)     History/Other:    Allergies, PMH, SocHx and FHx reviewed and updated as appropriate in Epic on    Insulin Lispro, 1 Unit Dial, 100 UNIT/ML Subcutaneous Solution Pen-injector USE AS DIRECTED BEFORE MEALS UP TO TOTAL DAILY DOSE  UNITS 90 mL 2    metFORMIN  MG Oral Tablet 24 Hr Take 2 tablets (1,000 mg total) by mouth daily with breakfast AND 1 tablet (500 mg total) daily with dinner. 270 tablet 4    Insulin Pen Needle 32G X 4 MM Does not apply Misc Use for insulin  each 2    dexamethasone 1 MG Oral Tab Take 1 tablet (1 mg total) by mouth one time for 1 dose. Take pill at 11pm. Get fasting blood test the next morning 8-9am. 1 tablet 0    ergocalciferol 1.25 MG (86369 UT) Oral Cap Take 1 capsule (50,000 Units total) by mouth once a week. 12 capsule 0    insulin glargine (LANTUS SOLOSTAR) 100 UNIT/ML Subcutaneous Solution Pen-injector Inject 40 Units into the skin 2 (two) times daily. Inject up to 80 units daily 45 mL 11    RYBELSUS 7 MG Oral Tab Take 1 tablet by mouth daily.      empagliflozin (JARDIANCE) 10 MG Oral Tab Take 1 tablet (10 mg total) by mouth daily. 90 tablet 3     Allergies[1]  Current Outpatient Medications   Medication Sig Dispense Refill    Insulin Lispro, 1 Unit Dial, 100 UNIT/ML Subcutaneous Solution Pen-injector USE AS DIRECTED BEFORE MEALS UP TO TOTAL DAILY DOSE  UNITS 90 mL 2    metFORMIN  MG Oral Tablet 24 Hr Take 2 tablets (1,000 mg total) by mouth daily with breakfast AND 1 tablet (500 mg total) daily with dinner. 270 tablet 4    Insulin Pen Needle 32G X 4 MM Does not apply Misc Use for insulin  each 2    dexamethasone 1 MG Oral Tab Take 1 tablet (1 mg total) by mouth one time for 1 dose. Take pill at 11pm. Get fasting blood test the next morning  8-9am. 1 tablet 0    ergocalciferol 1.25 MG (65145 UT) Oral Cap Take 1 capsule (50,000 Units total) by mouth once a week. 12 capsule 0    insulin glargine (LANTUS SOLOSTAR) 100 UNIT/ML Subcutaneous Solution Pen-injector Inject 40 Units into the skin 2 (two) times daily. Inject up to 80 units daily 45 mL 11    RYBELSUS 7 MG Oral Tab Take 1 tablet by mouth daily.      empagliflozin (JARDIANCE) 10 MG Oral Tab Take 1 tablet (10 mg total) by mouth daily. 90 tablet 3    atorvastatin (LIPITOR) 10 MG Oral Tab Take 1 tablet (10 mg total) by mouth daily. 90 tablet 3    hydroCHLOROthiazide 25 MG Oral Tab Take 1 tablet (25 mg total) by mouth daily. 90 tablet 3    losartan 100 MG Oral Tab Take 1 tablet (100 mg total) by mouth daily. 90 tablet 3    spironolactone 50 MG Oral Tab Take 1 tablet (50 mg total) by mouth daily. 90 tablet 3    metoprolol succinate  MG Oral Tablet 24 Hr Take 1 tablet (100 mg total) by mouth daily. 90 tablet 3    Glucose Blood (ONETOUCH VERIO) In Vitro Strip Check glucose at least 4x daily. 100 strip 11    OneTouch Delica Lancets 33G Does not apply Misc Check glucose 4x daily before insulin injections. 100 each 11    AMLODIPINE 10 MG Oral Tab TAKE 1 TABLET(10 MG) BY MOUTH DAILY 90 tablet 3    Insulin Syringe-Needle U-100 31G X 15/64\" 1 ML Does not apply Misc 1 Syringe 3 (three) times daily before meals. 300 each 1    potassium citrate 10 MEQ (1080 MG) Oral Tab CR Take 2 tablets (20 mEq total) by mouth 2 (two) times a day. 180 tablet 3     Past Medical History:    Calculus of kidney    Diabetes (HCC)    High blood pressure    History of blood transfusion     Family History   Problem Relation Age of Onset    Diabetes Mother     Diabetes Sister      Social history: Reviewed.      ROS/Exam    REVIEW OF SYSTEMS: Ten point review of systems has been performed and is otherwise negative and/or non-contributory, except as described above.     VITALS  Vitals:    11/04/24 0849   BP: 138/76   Pulse: 80   SpO2:  97%   Weight: 186 lb (84.4 kg)   Height: 5' 5\" (1.651 m)       Wt Readings from Last 6 Encounters:   11/04/24 186 lb (84.4 kg)   10/07/24 188 lb 9.6 oz (85.5 kg)   06/25/24 187 lb (84.8 kg)   06/01/24 188 lb 9.6 oz (85.5 kg)   03/12/24 193 lb (87.5 kg)   11/27/23 191 lb 6.4 oz (86.8 kg)       PHYSICAL EXAM  CONSTITUTIONAL:  awake, alert, cooperative, no apparent distress, and appears stated age    PSYCH: normal affect  LUNGS: breathing comfortably  CARDIOVASCULAR:  regular rate   NECK:  no palpable thyroid nodules      Labs/Imaging: Pertinent imaging reviewed.    Overall glucose control:  Lab Results   Component Value Date    A1C 6.6 (A) 10/07/2024    A1C 6.9 (H) 06/01/2024    A1C 7.7 (A) 03/12/2024    A1C 7.4 (H) 11/05/2023    A1C 7.5 (A) 08/11/2023       Supplementary Documentation:   -Surveillance for Diabetes Complications & Risks  Foot exam/neuropathy: Last Foot Exam: 03/12/24    Retinopathy screening: Last Dilated Eye Exam: 06/25/24  Eye Exam shows Diabetic Retinopathy?: Yes      Assessment & Plan:     ICD-10-CM    1. Controlled type 2 diabetes mellitus with hyperglycemia, with long-term current use of insulin ([facility])  E11.65 Comp Metabolic Panel (14) [E]    Z79.4 dexamethasone 1 MG Oral Tab     ACTH, Plasma     Cortisol     Dexamethasone, Serum      2. Mixed hyperlipidemia  E78.2 Lipid Panel [E]      3. Type 2 diabetes mellitus with hyperglycemia, with long-term current use of insulin ([facility])  E11.65 Insulin Lispro, 1 Unit Dial, 100 UNIT/ML Subcutaneous Solution Pen-injector    Z79.4 Insulin Pen Needle 32G X 4 MM Does not apply Misc      4. Type 2 diabetes mellitus with microalbuminuria, with long-term current use of insulin ([facility])  E11.29 metFORMIN  MG Oral Tablet 24 Hr    R80.9     Z79.4       5. Essential hypertension  I10 Comp Metabolic Panel (14) [E]     dexamethasone 1 MG Oral Tab     ACTH, Plasma     Cortisol     Dexamethasone, Serum      6. Vitamin D deficiency  E55.9 ergocalciferol 1.25 MG (28362  UT) Oral Cap          Yvonne CortesJesus Alanis Reyes is a pleasant 44 year old female here for evaluation of:    #Diabetes  PMHx of Type 2 diabetes mellitus diagnosed in 2019.        Last A1c value was 6.6% done 10/7/2024. 6/2024 CBC without anemia  Goal <7%. Importance of better glucose control in preventing onset/progression of end-organ damage discussed, as well as goals of therapy and clinical significance of A1C.  She has a hx of T2DM since 2019 and was started on insulin at that time. Her weight based insulin dosing is around 42-50 units for TDD. She is currently receiving around 3x that dose + oral agents. We reviewed other etiology of diabetes including cushing's. Patient is agreeable to complete DST.      Plan:  - med changes: Continue empagliflozin Tabs - 10 MG; Insulin Lispro (1 Unit Dial) Sopn - 100 UNIT/ML 30u TID; Lantus SoloStar Sopn - 100 UNIT/ML 44u BID; metFORMIN ER Tb24 - 500 MG 2 tabs BID (she is only taking 2 tabs in the evening); Rybelsus 14 mg  -Reviewed instructions regarding completing DST. Will discuss next steps once labs result. CT A+P in 2020 without adrenal nodule/mass    - continue to check BG 2x/day using glucometer or CGM  - SGLT2i instructions provided re: surgery, times of illness/dehydration   - We reviewed GLP-1 action, risk vs benefit, dosing, and potential side effects. Patient denies hx of pancreatitis, gastroparesis, or personal or family hx of medullary thyroid CA or MEN 2.     - Discussed management of low glucose and targeted glucose levels and provided instructions in AVS   -See above header \"Supplementary Documentation\" for surveillance for diabetes complications & risks    Nephropathy screening:   Lab Results   Component Value Date    EGFRCR 49 (L) 06/01/2024    MICROALBCREA 2,306.5 (H) 06/01/2024   -Patient with adequate A1c control but does have a hx of uncontrolled HTN, now controlled. Increased malb could be multifactorial. Will follow and consider nephrology consult  if it continues to remain elevated     Blood pressure control: - SBP is not to goal <130   BP Readings from Last 1 Encounters:   11/04/24 138/76   BP Meds: amLODIPine Tabs - 10 MG; hydroCHLOROthiazide Tabs - 25 MG; losartan Tabs - 100 MG; metoprolol succinate ER Tb24 - 100 MG; spironolactone Tabs - 50 MG   Diagnosed in 2020 when she was admitted with pyelonephritis. Family hx of HTN in grandparents. She is currently on 5 anti-hypertensive agents and also has a hx of hypokalemia. CT A/P in 2020 without adrenal nodule/mass.    -Repeat CMP and will discuss with patient regarding if she needs to restart potassium  -Will also discuss with PCP regarding switching from spironolactone to hydralazine with plan to complete aldosterone/renin labs 4 weeks after discontinuation of spironolactone     #Hyperlipidemia  Lipids: LDL is not to goal   Lab Results   Component Value Date     (H) 06/01/2024    TRIG 227 (H) 06/01/2024   Cholesterol Meds: atorvastatin Tabs - 10 MG     -Repeat lipid panel prior to follow up visit and discuss increase to 40 mg daily     #Vitamin D Deficiency    Lab Results   Component Value Date    VITD 11.6 (L) 06/01/2024   Hx of vitamin D deficiency since 6/2024. Was previously taking vitamin D supplements but has been off of any vitamin D since 2023     -Start ergocalciferol 50,000 units weekly x3 months, sent to pharmacy   -Repeat vitamin D in 3 months, will place order at follow up visit         Return in about 6 weeks (around 12/16/2024).    The above plan was discussed in detail with the patient who verbalized understanding and agreement.      A total of 60 minutes was spent today on obtaining history, reviewing pertinent labs, reviewing relevant pathophysiology with patient, reviewing outside records, evaluating patient, providing multiple treatment options, completing documentation and orders, and communicating with other providers as appropriate.     DO RJ Morohco  Endocrinology  11/4/2024     In reviewing this note, please be advised that Dragon Voice Recognition software used to dictate the note may have made errors in recognizing some of the words or phrases.     Note to patient: The 21 Century Cures Act makes medical notes like these available to patients in the interest of transparency. However, be advised this is a medical document. It is intended as peer to peer communication. It is written in medical language and may contain abbreviations or verbiage that are unfamiliar. It may appear blunt or direct. Medical documents are intended to carry relevant information, facts as evident, and the clinical opinion of the practitioner.            [1]   Allergies  Allergen Reactions    Peanut Butter Flavor SWELLING     Lips swell

## 2024-11-11 ENCOUNTER — LAB ENCOUNTER (OUTPATIENT)
Dept: LAB | Facility: HOSPITAL | Age: 44
End: 2024-11-11
Attending: STUDENT IN AN ORGANIZED HEALTH CARE EDUCATION/TRAINING PROGRAM
Payer: MEDICAID

## 2024-11-11 DIAGNOSIS — I10 ESSENTIAL HYPERTENSION: ICD-10-CM

## 2024-11-11 DIAGNOSIS — Z79.4 CONTROLLED TYPE 2 DIABETES MELLITUS WITH HYPERGLYCEMIA, WITH LONG-TERM CURRENT USE OF INSULIN (HCC): ICD-10-CM

## 2024-11-11 DIAGNOSIS — E11.65 CONTROLLED TYPE 2 DIABETES MELLITUS WITH HYPERGLYCEMIA, WITH LONG-TERM CURRENT USE OF INSULIN (HCC): ICD-10-CM

## 2024-11-11 LAB
ALBUMIN SERPL-MCNC: 4.8 G/DL (ref 3.2–4.8)
ALBUMIN/GLOB SERPL: 1.5 {RATIO} (ref 1–2)
ALP LIVER SERPL-CCNC: 71 U/L
ALT SERPL-CCNC: 15 U/L
ANION GAP SERPL CALC-SCNC: 5 MMOL/L (ref 0–18)
AST SERPL-CCNC: 15 U/L (ref ?–34)
BILIRUB SERPL-MCNC: 0.5 MG/DL (ref 0.3–1.2)
BUN BLD-MCNC: 28 MG/DL (ref 9–23)
CALCIUM BLD-MCNC: 10.1 MG/DL (ref 8.7–10.4)
CHLORIDE SERPL-SCNC: 104 MMOL/L (ref 98–112)
CO2 SERPL-SCNC: 26 MMOL/L (ref 21–32)
CORTIS SERPL-MCNC: 9 UG/DL
CREAT BLD-MCNC: 1.44 MG/DL
EGFRCR SERPLBLD CKD-EPI 2021: 45 ML/MIN/1.73M2 (ref 60–?)
FASTING STATUS PATIENT QL REPORTED: YES
GLOBULIN PLAS-MCNC: 3.3 G/DL (ref 2–3.5)
GLUCOSE BLD-MCNC: 176 MG/DL (ref 70–99)
OSMOLALITY SERPL CALC.SUM OF ELEC: 290 MOSM/KG (ref 275–295)
POTASSIUM SERPL-SCNC: 4.9 MMOL/L (ref 3.5–5.1)
PROT SERPL-MCNC: 8.1 G/DL (ref 5.7–8.2)
SODIUM SERPL-SCNC: 135 MMOL/L (ref 136–145)

## 2024-11-11 PROCEDURE — 80053 COMPREHEN METABOLIC PANEL: CPT

## 2024-11-11 PROCEDURE — 36415 COLL VENOUS BLD VENIPUNCTURE: CPT

## 2024-11-11 PROCEDURE — 82533 TOTAL CORTISOL: CPT

## 2024-11-11 PROCEDURE — 80299 QUANTITATIVE ASSAY DRUG: CPT

## 2024-11-11 PROCEDURE — 82024 ASSAY OF ACTH: CPT

## 2024-11-12 LAB — ACTH: 4.2 PG/ML

## 2024-11-23 LAB — DEXAMETHASONE, SERUM: 265 NG/DL

## 2024-12-22 DIAGNOSIS — I10 ESSENTIAL HYPERTENSION: ICD-10-CM

## 2024-12-23 RX ORDER — SPIRONOLACTONE 50 MG/1
50 TABLET, FILM COATED ORAL DAILY
Qty: 90 TABLET | Refills: 3 | Status: SHIPPED | OUTPATIENT
Start: 2024-12-23

## 2024-12-23 NOTE — TELEPHONE ENCOUNTER
Requested Prescriptions     Signed Prescriptions Disp Refills    SPIRONOLACTONE 50 MG Oral Tab 90 tablet 3     Sig: TAKE 1 TABLET(50 MG) BY MOUTH DAILY     Authorizing Provider: GOMEZ CAVAZOS     Ordering User: NIVIA MCGOVERN      Refilled per protocol/OV notes

## 2024-12-31 ENCOUNTER — OFFICE VISIT (OUTPATIENT)
Facility: CLINIC | Age: 44
End: 2024-12-31
Payer: MEDICAID

## 2024-12-31 VITALS
WEIGHT: 183 LBS | DIASTOLIC BLOOD PRESSURE: 90 MMHG | SYSTOLIC BLOOD PRESSURE: 142 MMHG | HEIGHT: 65 IN | HEART RATE: 87 BPM | BODY MASS INDEX: 30.49 KG/M2 | OXYGEN SATURATION: 96 %

## 2024-12-31 DIAGNOSIS — E55.9 VITAMIN D DEFICIENCY: ICD-10-CM

## 2024-12-31 DIAGNOSIS — R79.89 HIGH SERUM CORTISOL: Primary | ICD-10-CM

## 2024-12-31 DIAGNOSIS — E78.2 MIXED HYPERLIPIDEMIA: ICD-10-CM

## 2024-12-31 DIAGNOSIS — E11.65 CONTROLLED TYPE 2 DIABETES MELLITUS WITH HYPERGLYCEMIA, WITH LONG-TERM CURRENT USE OF INSULIN (HCC): ICD-10-CM

## 2024-12-31 DIAGNOSIS — Z79.4 CONTROLLED TYPE 2 DIABETES MELLITUS WITH HYPERGLYCEMIA, WITH LONG-TERM CURRENT USE OF INSULIN (HCC): ICD-10-CM

## 2024-12-31 PROCEDURE — 99214 OFFICE O/P EST MOD 30 MIN: CPT | Performed by: STUDENT IN AN ORGANIZED HEALTH CARE EDUCATION/TRAINING PROGRAM

## 2024-12-31 RX ORDER — ORAL SEMAGLUTIDE 7 MG/1
1 TABLET ORAL DAILY
Qty: 30 TABLET | Refills: 0 | Status: CANCELLED | OUTPATIENT
Start: 2024-12-31

## 2024-12-31 RX ORDER — ORAL SEMAGLUTIDE 14 MG/1
14 TABLET ORAL DAILY
Qty: 90 TABLET | Refills: 1 | Status: SHIPPED | OUTPATIENT
Start: 2024-12-31 | End: 2025-01-30

## 2024-12-31 NOTE — PATIENT INSTRUCTIONS
Summary of today's visit:  Medication changes:  Continue same dose. I have refilled Rybelsus today   Please complete your labs prior to your follow up visit.  Cholesterol blood test  Cortisol 11 PM swab testing   We will get a midnight salivary cortisol level as below:  Collect sample between 11:00 p.m. and midnight on two separate occasions, preferably two nights in a row, for a total of 2 samples.   Do NOT smoke or drink alcohol for at least twelve (12) hours before collecting the samples.   Do NOT brush your teeth for at least one (1) hour before collecting the samples.   Do NOT eat or drink anything thirty (30) minutes before collection. Rinse your mouth out with water ten (10) minutes before collection. Do NOT use hand creams, lotions, or ointments before collecting the samples. 2. Between 11:00 p.m. and midnight, remove the collection tube from the outer plastic container.  Once these labs result, my clinic will keep you updated with next steps.         General follow up information:  Please let us know if you require any refills at least 1 week prior to your medication running out. If you do run out of medication, please call our office ASAP to request refills (do not wait until your follow up).   Please call our office if sugars at home are consistently greater than 250 or less than 70 for medication adjustment (do not wait until your follow up appointment).  The on-call pager is for emergencies only. If you are a type 1 diabetic and run out of insulin after business hours 8AM-4PM, you may call the on-call pager for a refill to a 24 hour pharmacy. All other refill requests should be requested during business hours.       Return Visit:  [x]  APN in 3 months  [] Video visit  []  In person only  [x]  Physician in 6 months    []  Directions to 1st floor lab    [x]  Give blood sugar log  []  PAP paperwork for Ozempic/Jardiance (english/German)   []  Diabetes Education:    []  Carb Aware T2DM (60)   []  Carb  count refresh T1DM (60)   [] CGM start _____________ (30)   []  Pump 101 (60)   []  Schedule with Cole for ___________ (60)   []  DMBO (60)      HOW TO TREAT LOW BLOOD SUGAR (Hypoglycemia)  Low blood sugar = Less than 70, or if you start to have symptoms  Symptoms: Shaking or trembling, fast heart rate, extreme hunger, sweating, confusion/difficulty concentrating, dizziness    How to treat a low blood sugar if you are able to eat/drink: The Rule of 15/15  If you are using a continuous glucose monitor that says you are low, but you do not have any symptoms, verify on fingerstick that your blood sugar is actually <70 before treating.   Eat 15 grams of carbs (see examples below)  Check your blood sugar after 15 minutes. If it’s still below your target range, have another serving.   Repeat these steps until sugar is >90. Once it’s in range, if you're nervous about your sugar going low again, have a protein source (ie, a spoonful of peanut butter).   If you have a CGM, you want to look for how your arrow has changed. If you arrow is pointed up or sideways after 15 min, give your CGM more time OR check with a finger stick. Try not to eat more food until at least 15 min after the first BG check - otherwise you risk spiking your sugar too high.  If you are experiencing symptoms and you are unable to check your blood glucose for any reason, treat the hypoglycemia.  If someone has a low blood sugar and is unconscious: Don’t hesitate to call 911. Use emergency glucagon. If someone is unconscious and glucagon is not available or someone does not know how to use it, call 911 immediately.     To treat a low, carry with you easy, pre-portioned treatment for low blood sugars that are 15G of carbs:   - Children sized squeeze pouch applesauce (high fiber + carbs help prevent too high of a spike)  - Small children's sized juicebox- 15g carb --> 4oz juice box  - Glucose tablets from [employer], you can find them near diabetes  supplies --> Note, you will need to eat 3-4 tablets to get to 15g of carbs  - Child sized fruit snack pack- look for one with 15 grams of total carbohydrate  - Choice of how to treat your low is important. Complex carbs, or foods that contain fats along with carbs (like chocolate) can slow the absorption of glucose and should NOT be used to treat an emergency low.

## 2024-12-31 NOTE — PROGRESS NOTES
[facility] Note    Name: Teresa De Jesus Alanis Reyes    Date: 2024    Teresa De Jesus Alanis Reyes is a 44 year old female who presents for evaluation of T2DM management.     Chief complaint: Diabetes (T2DM / Lab Followup no concerns, needs refills/Last A1c 6.6 10/7/24/Eye Exam 2024/Foot Exam 10/2024)       Subjective:   Initial HPI consult - 2024 with : Luba #[id number]  DM hx:  -Diagnosed with diabetes in 2020 when she was admitted with hyperglycemia/acute pyelonephritis with hx of left staghorn calculi   -Started insuln: During above admission  -Family history- yes, sister with DM     -Re: potential DM medication contraindication (if positive, checkbox selected):  [] History of pancreatitis  [] Personal/fam hx of medullary thyroid cancer/MEN2  [] History of recent/frequent UTI/yeast infxn- denies; had 1 episode around  when she had kidney stones  [] Previous amputation related to diabetes    -Presence of associated DM complications (if positive, checkbox selected):  [] Macrovascular complications (CAD/CVA/PAD)  [] Neuropathy  [] Retinopathy  [] Nephropathy  [] HTN  [] Hyperlipidemia  [] Stroke/TIA  [] Gastroparesis  [] Wound, ulcer or amputations    - Lifestyle: Endorses compliance and states she watches her diet. Does note central adiposity but denies dark stretch marks on abdomen, easy bruising, or proximal muscle weakness. Has a hx of kidney stones. Is not on a calcium supplement. Drinks around 30 oz of water daily     - Steroid use: No    DM meds at first office visit:  empagliflozin Tabs - 10 MG; Insulin Lispro (1 Unit Dial) Sopn - 100 UNIT/ML 30u TID; Lantus SoloStar Sopn - 100 UNIT/ML 44u BID; metFORMIN ER Tb24 - 500 MG 2 tabs BID (she is only taking 2 tabs in the evening); Rybelsus 14 mg     Blood Glucose-finger sticks, does not have glucometer with her. Checking 2 times per day. Morning/fastin-133, bedtime: 160-170, episodes of hypoglycemia: No      Previously trialed/failed DM meds: Toujeo/Tresiba (insurance coverage), Synjardy (insurance coverage), Repaglinide discontinued when prandial insulin started, basaglar (insurance coverage), Trulicity (had constipation)     Interval Hx 12/31/24 Ludmila [id number]  Labs: 11/2024 ACTH 4.2, cortisol 9.0, dexamethasone 265   Diabetes (T2DM / Lab Followup no concerns, needs refills/Last A1c 6.6 10/7/24/Eye Exam 6/2024/Foot Exam 10/2024)   Continue empagliflozin Tabs - 10 MG; Insulin Lispro (1 Unit Dial) Sopn - 100 UNIT/ML 25-30u TID; Lantus SoloStar Sopn - 100 UNIT/ML 40u BID; metFORMIN ER Tb24 - 500 MG 2 tabs BID (she is only taking 2 tabs in the evening); Rybelsus 14 mg   Reviewed BG log ranging 106-162, ocassional 240 (around time of dexamethasone)      History/Other:    Allergies, PMH, SocHx and FHx reviewed and updated as appropriate in Epic on    Semaglutide (RYBELSUS) 14 MG Oral Tab Take 14 mg by mouth daily. 90 tablet 1     Allergies[1]  Current Outpatient Medications   Medication Sig Dispense Refill    Semaglutide (RYBELSUS) 14 MG Oral Tab Take 14 mg by mouth daily. 90 tablet 1    SPIRONOLACTONE 50 MG Oral Tab TAKE 1 TABLET(50 MG) BY MOUTH DAILY 90 tablet 3    Insulin Lispro, 1 Unit Dial, 100 UNIT/ML Subcutaneous Solution Pen-injector USE AS DIRECTED BEFORE MEALS UP TO TOTAL DAILY DOSE  UNITS 90 mL 2    metFORMIN  MG Oral Tablet 24 Hr Take 2 tablets (1,000 mg total) by mouth daily with breakfast AND 1 tablet (500 mg total) daily with dinner. 270 tablet 4    Insulin Pen Needle 32G X 4 MM Does not apply Misc Use for insulin  each 2    ergocalciferol 1.25 MG (55680 UT) Oral Cap Take 1 capsule (50,000 Units total) by mouth once a week. 12 capsule 0    insulin glargine (LANTUS SOLOSTAR) 100 UNIT/ML Subcutaneous Solution Pen-injector Inject 40 Units into the skin 2 (two) times daily. Inject up to 80 units daily 45 mL 11    empagliflozin (JARDIANCE) 10 MG Oral Tab Take 1 tablet (10 mg total) by mouth  daily. 90 tablet 3    atorvastatin (LIPITOR) 10 MG Oral Tab Take 1 tablet (10 mg total) by mouth daily. 90 tablet 3    hydroCHLOROthiazide 25 MG Oral Tab Take 1 tablet (25 mg total) by mouth daily. 90 tablet 3    losartan 100 MG Oral Tab Take 1 tablet (100 mg total) by mouth daily. 90 tablet 3    metoprolol succinate  MG Oral Tablet 24 Hr Take 1 tablet (100 mg total) by mouth daily. 90 tablet 3    Glucose Blood (ONETOUCH VERIO) In Vitro Strip Check glucose at least 4x daily. 100 strip 11    OneTouch Delica Lancets 33G Does not apply Misc Check glucose 4x daily before insulin injections. 100 each 11    AMLODIPINE 10 MG Oral Tab TAKE 1 TABLET(10 MG) BY MOUTH DAILY 90 tablet 3    Insulin Syringe-Needle U-100 31G X 15/64\" 1 ML Does not apply Misc 1 Syringe 3 (three) times daily before meals. 300 each 1    potassium citrate 10 MEQ (1080 MG) Oral Tab CR Take 2 tablets (20 mEq total) by mouth 2 (two) times a day. 180 tablet 3     Past Medical History:    Calculus of kidney    Diabetes (HCC)    High blood pressure    History of blood transfusion     Family History   Problem Relation Age of Onset    Diabetes Mother     Diabetes Sister      Social history: Reviewed.      ROS/Exam    REVIEW OF SYSTEMS: Ten point review of systems has been performed and is otherwise negative and/or non-contributory, except as described above.     VITALS  Vitals:    12/31/24 1110   BP: 142/90   Pulse: 87   SpO2: 96%   Weight: 183 lb (83 kg)   Height: 5' 5\" (1.651 m)         Wt Readings from Last 6 Encounters:   12/31/24 183 lb (83 kg)   11/04/24 186 lb (84.4 kg)   10/07/24 188 lb 9.6 oz (85.5 kg)   06/25/24 187 lb (84.8 kg)   06/01/24 188 lb 9.6 oz (85.5 kg)   03/12/24 193 lb (87.5 kg)       PHYSICAL EXAM  CONSTITUTIONAL:  awake, alert, cooperative, no apparent distress, and appears stated age    PSYCH: normal affect  LUNGS: breathing comfortably  CARDIOVASCULAR:  regular rate   NECK:  no palpable thyroid nodules      Labs/Imaging:  Pertinent imaging reviewed.    Overall glucose control:  Lab Results   Component Value Date    A1C 6.6 (A) 10/07/2024    A1C 6.9 (H) 06/01/2024    A1C 7.7 (A) 03/12/2024    A1C 7.4 (H) 11/05/2023    A1C 7.5 (A) 08/11/2023       Supplementary Documentation:   -Surveillance for Diabetes Complications & Risks  Foot exam/neuropathy: Last Foot Exam: 03/12/24    Retinopathy screening: Last Dilated Eye Exam: 06/25/24  Eye Exam shows Diabetic Retinopathy?: Yes      Assessment & Plan:     ICD-10-CM    1. High serum cortisol  R79.89 Salivary Cortisol, MS ([facility])     Salivary Cortisol, MS ([facility])      2. Controlled type 2 diabetes mellitus with hyperglycemia, with long-term current use of insulin (HCC)  E11.65 Semaglutide (RYBELSUS) 14 MG Oral Tab    Z79.4       3. Mixed hyperlipidemia  E78.2 Lipid Panel [E]            Teresa De Jesus Alanis Reyes is a pleasant 44 year old female here for evaluation of:    #Diabetes  PMHx of Type 2 diabetes mellitus diagnosed in 2019.        Last A1c value was 6.6% done 10/7/2024. 6/2024 CBC without anemia  Goal <7%. Importance of better glucose control in preventing onset/progression of end-organ damage discussed, as well as goals of therapy and clinical significance of A1C.  She has a hx of T2DM since 2019 and was started on insulin at that time. Her weight based insulin dosing is around 42-50 units for TDD. She is currently receiving around 3x that dose + oral agents. We reviewed other etiology of diabetes including cushing's. Patient is agreeable to complete DST, which was abnormal.     Plan:  - med changes: Continue empagliflozin Tabs - 10 MG; Insulin Lispro (1 Unit Dial) Sopn - 100 UNIT/ML 25-30u TID; Lantus SoloStar Sopn - 100 UNIT/ML 40u BID; metFORMIN ER Tb24 - 500 MG 2 tabs BID (she is only taking 2 tabs in the evening); Rybelsus 14 mg  -Reviewed instructions regarding completing MSTx2. Will discuss next steps once labs result. CT A+P in 2020 without adrenal  nodule/mass    - continue to check BG 2x/day using glucometer or CGM  - SGLT2i instructions provided re: surgery, times of illness/dehydration   - We reviewed GLP-1 action, risk vs benefit, dosing, and potential side effects. Patient denies hx of pancreatitis, gastroparesis, or personal or family hx of medullary thyroid CA or MEN 2.     - Discussed management of low glucose and targeted glucose levels and provided instructions in AVS   -See above header \"Supplementary Documentation\" for surveillance for diabetes complications & risks    Nephropathy screening:   Lab Results   Component Value Date    EGFRCR 45 (L) 11/11/2024    MICROALBCREA 2,306.5 (H) 06/01/2024   -Patient with adequate A1c control but does have a hx of uncontrolled HTN, now controlled. Increased malb could be multifactorial. Will follow and consider nephrology consult if it continues to remain elevated     Blood pressure control: - SBP is not to goal <130   BP Readings from Last 1 Encounters:   12/31/24 142/90   BP Meds: amLODIPine Tabs - 10 MG; hydroCHLOROthiazide Tabs - 25 MG; losartan Tabs - 100 MG; metoprolol succinate ER Tb24 - 100 MG; spironolactone Tabs - 50 MG   Diagnosed in 2020 when she was admitted with pyelonephritis. Family hx of HTN in grandparents. She is currently on 5 anti-hypertensive agents and also has a hx of hypokalemia. CT A/P in 2020 without adrenal nodule/mass.    -Repeat CMP and will discuss with patient regarding if she needs to restart potassium  -Will also discuss with PCP regarding switching from spironolactone to hydralazine with plan to complete aldosterone/renin labs 4 weeks after discontinuation of spironolactone     #Hyperlipidemia  Lipids: LDL is not to goal   Lab Results   Component Value Date     (H) 06/01/2024    TRIG 227 (H) 06/01/2024   Cholesterol Meds: atorvastatin Tabs - 10 MG   -Repeat lipid panel now and discuss increase to 40 mg daily     #Vitamin D Deficiency    Lab Results   Component Value  Date    VITD 11.6 (L) 06/01/2024   Hx of vitamin D deficiency since 6/2024. Was previously taking vitamin D supplements but has been off of any vitamin D since 2023     -Continue ergocalciferol 50,000 units weekly x3 months, sent to pharmacy   -Repeat vitamin D after ergo is complete, will place order         Return in about 6 months (around 6/30/2025).    The above plan was discussed in detail with the patient who verbalized understanding and agreement.         Judith Rivera DO  Replaced by [facility]  12/31/2024     In reviewing this note, please be advised that Dragon Voice Recognition software used to dictate the note may have made errors in recognizing some of the words or phrases.     Note to patient: The 21 Century Cures Act makes medical notes like these available to patients in the interest of transparency. However, be advised this is a medical document. It is intended as peer to peer communication. It is written in medical language and may contain abbreviations or verbiage that are unfamiliar. It may appear blunt or direct. Medical documents are intended to carry relevant information, facts as evident, and the clinical opinion of the practitioner.            [1]   Allergies  Allergen Reactions    Peanut Butter Flavor SWELLING     Lips swell

## 2025-01-16 DIAGNOSIS — I10 ESSENTIAL HYPERTENSION: ICD-10-CM

## 2025-01-20 RX ORDER — SPIRONOLACTONE 50 MG/1
50 TABLET, FILM COATED ORAL DAILY
Qty: 90 TABLET | Refills: 3 | Status: SHIPPED | OUTPATIENT
Start: 2025-01-20

## 2025-01-24 ENCOUNTER — TELEPHONE (OUTPATIENT)
Facility: CLINIC | Age: 45
End: 2025-01-24

## 2025-01-24 NOTE — TELEPHONE ENCOUNTER
2025, patient called in office confirmed name and . She tried to get refill for humalog and rybelsus thru her pharmacy and was told she can to contact us and go thru her insurance first. Pharmacy needs a PA.

## 2025-01-24 NOTE — TELEPHONE ENCOUNTER
Phoned patient's pharmacy. States Lispro \"too soon\" for fill. They will process on 1/29.    Isra went through ok- will have to order as not in stock, should be in by Monday or Tuesday.     Phoned patient using [employer] for Sammarinese interpretation, Jey, ID # [id number]. Reviewed above, pt verbalized understanding of all.    Closing this encounter.

## 2025-03-27 ENCOUNTER — LAB ENCOUNTER (OUTPATIENT)
Dept: LAB | Age: 45
End: 2025-03-27
Attending: FAMILY MEDICINE
Payer: MEDICAID

## 2025-03-27 ENCOUNTER — OFFICE VISIT (OUTPATIENT)
Facility: CLINIC | Age: 45
End: 2025-03-27
Payer: MEDICAID

## 2025-03-27 VITALS — SYSTOLIC BLOOD PRESSURE: 124 MMHG | HEART RATE: 76 BPM | OXYGEN SATURATION: 99 % | DIASTOLIC BLOOD PRESSURE: 88 MMHG

## 2025-03-27 DIAGNOSIS — R79.89 HIGH SERUM CORTISOL: ICD-10-CM

## 2025-03-27 DIAGNOSIS — I15.2 HYPERTENSION ASSOCIATED WITH TYPE 2 DIABETES MELLITUS (HCC): ICD-10-CM

## 2025-03-27 DIAGNOSIS — E11.65 CONTROLLED TYPE 2 DIABETES MELLITUS WITH HYPERGLYCEMIA, WITH LONG-TERM CURRENT USE OF INSULIN (HCC): Primary | ICD-10-CM

## 2025-03-27 DIAGNOSIS — E11.21 MICROALBUMINURIC DIABETIC NEPHROPATHY (HCC): ICD-10-CM

## 2025-03-27 DIAGNOSIS — E11.59 HYPERTENSION ASSOCIATED WITH TYPE 2 DIABETES MELLITUS (HCC): ICD-10-CM

## 2025-03-27 DIAGNOSIS — E55.9 VITAMIN D DEFICIENCY: ICD-10-CM

## 2025-03-27 DIAGNOSIS — Z79.4 CONTROLLED TYPE 2 DIABETES MELLITUS WITH HYPERGLYCEMIA, WITH LONG-TERM CURRENT USE OF INSULIN (HCC): Primary | ICD-10-CM

## 2025-03-27 DIAGNOSIS — E11.69 HYPERLIPIDEMIA ASSOCIATED WITH TYPE 2 DIABETES MELLITUS (HCC): ICD-10-CM

## 2025-03-27 DIAGNOSIS — E78.5 HYPERLIPIDEMIA ASSOCIATED WITH TYPE 2 DIABETES MELLITUS (HCC): ICD-10-CM

## 2025-03-27 DIAGNOSIS — E11.65 CONTROLLED TYPE 2 DIABETES MELLITUS WITH HYPERGLYCEMIA, WITH LONG-TERM CURRENT USE OF INSULIN (HCC): ICD-10-CM

## 2025-03-27 DIAGNOSIS — Z79.4 CONTROLLED TYPE 2 DIABETES MELLITUS WITH HYPERGLYCEMIA, WITH LONG-TERM CURRENT USE OF INSULIN (HCC): ICD-10-CM

## 2025-03-27 DIAGNOSIS — E78.2 MIXED HYPERLIPIDEMIA: ICD-10-CM

## 2025-03-27 LAB
ALBUMIN SERPL-MCNC: 5 G/DL (ref 3.2–4.8)
ALBUMIN/GLOB SERPL: 1.5 {RATIO} (ref 1–2)
ALP LIVER SERPL-CCNC: 69 U/L
ALT SERPL-CCNC: 14 U/L
ANION GAP SERPL CALC-SCNC: 10 MMOL/L (ref 0–18)
AST SERPL-CCNC: 15 U/L (ref ?–34)
BILIRUB SERPL-MCNC: 0.4 MG/DL (ref 0.3–1.2)
BUN BLD-MCNC: 37 MG/DL (ref 9–23)
CALCIUM BLD-MCNC: 9.9 MG/DL (ref 8.7–10.6)
CHLORIDE SERPL-SCNC: 104 MMOL/L (ref 98–112)
CHOLEST SERPL-MCNC: 189 MG/DL (ref ?–200)
CO2 SERPL-SCNC: 24 MMOL/L (ref 21–32)
CREAT BLD-MCNC: 1.6 MG/DL
CREAT UR-SCNC: 69.8 MG/DL
EGFRCR SERPLBLD CKD-EPI 2021: 40 ML/MIN/1.73M2 (ref 60–?)
EST. AVERAGE GLUCOSE BLD GHB EST-MCNC: 177 MG/DL (ref 68–126)
FASTING PATIENT LIPID ANSWER: YES
FASTING STATUS PATIENT QL REPORTED: YES
GLOBULIN PLAS-MCNC: 3.4 G/DL (ref 2–3.5)
GLUCOSE BLD-MCNC: 111 MG/DL (ref 70–99)
HBA1C MFR BLD: 7.8 % (ref ?–5.7)
HDLC SERPL-MCNC: 40 MG/DL (ref 40–59)
HEMOGLOBIN A1C: 7.2 % (ref 4.3–5.6)
LDLC SERPL CALC-MCNC: 111 MG/DL (ref ?–100)
MICROALBUMIN UR-MCNC: 45 MG/DL
MICROALBUMIN/CREAT 24H UR-RTO: 644.7 UG/MG (ref ?–30)
NONHDLC SERPL-MCNC: 149 MG/DL (ref ?–130)
OSMOLALITY SERPL CALC.SUM OF ELEC: 295 MOSM/KG (ref 275–295)
POTASSIUM SERPL-SCNC: 4.2 MMOL/L (ref 3.5–5.1)
PROT SERPL-MCNC: 8.4 G/DL (ref 5.7–8.2)
SODIUM SERPL-SCNC: 138 MMOL/L (ref 136–145)
TRIGL SERPL-MCNC: 217 MG/DL (ref 30–149)
VLDLC SERPL CALC-MCNC: 38 MG/DL (ref 0–30)

## 2025-03-27 PROCEDURE — 83036 HEMOGLOBIN GLYCOSYLATED A1C: CPT

## 2025-03-27 PROCEDURE — 99215 OFFICE O/P EST HI 40 MIN: CPT

## 2025-03-27 PROCEDURE — 82043 UR ALBUMIN QUANTITATIVE: CPT

## 2025-03-27 PROCEDURE — 82570 ASSAY OF URINE CREATININE: CPT

## 2025-03-27 PROCEDURE — 80053 COMPREHEN METABOLIC PANEL: CPT

## 2025-03-27 PROCEDURE — 80061 LIPID PANEL: CPT

## 2025-03-27 PROCEDURE — 36415 COLL VENOUS BLD VENIPUNCTURE: CPT

## 2025-03-27 RX ORDER — ORAL SEMAGLUTIDE 14 MG/1
1 TABLET ORAL DAILY
COMMUNITY
Start: 2025-01-25

## 2025-03-27 RX ORDER — METFORMIN HYDROCHLORIDE 500 MG/1
TABLET, EXTENDED RELEASE ORAL
COMMUNITY
Start: 2025-03-27

## 2025-03-27 RX ORDER — INSULIN LISPRO 100 [IU]/ML
INJECTION, SOLUTION INTRAVENOUS; SUBCUTANEOUS
COMMUNITY
Start: 2025-03-27

## 2025-03-27 NOTE — PROGRESS NOTES
[facility] Note    Name: Teresa De Jesus Alanis Reyes    Date: 3/27/2025    Teresa De Jesus Alanis Reyes is a 44 year old female who presents for evaluation of T2DM management.     Chief complaint: Follow - Up (Patient presents for DM 2 follow up. Last office visit with Dr. Rivera. Patient states she has tingling in her left foot that wakes her up at night. )       Subjective:   Initial HPI consult - 2024 with : [facility] #[id number]  DM hx:  -Diagnosed with diabetes in 2020 when she was admitted with hyperglycemia/acute pyelonephritis with hx of left staghorn calculi   -Started insuln: During above admission  -Family history- yes, sister with DM     -Re: potential DM medication contraindication (if positive, checkbox selected):  [] History of pancreatitis  [] Personal/fam hx of medullary thyroid cancer/MEN2  [] History of recent/frequent UTI/yeast infxn- denies; had 1 episode around  when she had kidney stones  [] Previous amputation related to diabetes    -Presence of associated DM complications (if positive, checkbox selected):  [] Macrovascular complications (CAD/CVA/PAD)  [] Neuropathy  [] Retinopathy  [] Nephropathy  [] HTN  [] Hyperlipidemia  [] Stroke/TIA  [] Gastroparesis  [] Wound, ulcer or amputations    - Lifestyle: Endorses compliance and states she watches her diet. Does note central adiposity but denies dark stretch marks on abdomen, easy bruising, or proximal muscle weakness. Has a hx of kidney stones. Is not on a calcium supplement. Drinks around 30 oz of water daily     - Steroid use: No    DM meds at first office visit:  empagliflozin Tabs - 10 MG; Insulin Lispro (1 Unit Dial) Sopn - 100 UNIT/ML 30u TID; Lantus SoloStar Sopn - 100 UNIT/ML 44u BID; metFORMIN ER Tb24 - 500 MG 2 tabs BID (she is only taking 2 tabs in the evening); Rybelsus 14 mg     Blood Glucose-finger sticks, does not have glucometer with her. Checking 2 times per day. Morning/fastin-133, bedtime:  160-170, episodes of hypoglycemia: No     Previously trialed/failed DM meds: Toujeo/Tresiba (insurance coverage), Synjardy (insurance coverage), Repaglinide discontinued when prandial insulin started, basaglar (insurance coverage), Trulicity (had constipation)     Interim hx:  3/27/2025-w/ Magdalena CERON. In person visit. Last A1c value was 7.2% done 3/27/2025.  Here with her partner David  Using : [id number]    Lifestyle: works in a [employer] (6am shift), thus eating more bread as of recent. Lives w/ partner/friend David    BG relatively stable since last office visit. Notes high BG for 2-3 days, went to 300s.  Eating more bread lately   FB, 110-122. Does note occasional hypoglycemia overnight, doesn't occur that often. Does get symptoms when it happens- feels warm/cold, stomach gets upset. Treats with an orange or a glucose tablet (just one)   - Doesn't like the CGM devices    Noting some tingling sensations / pin prick in her feet for the last 2-3 weeks, this is new for her. Only happens at night when she is trying to sleep.      DM meds at visit:   Diabetic Medications               Insulin Lispro, 1 Unit Dial, 100 UNIT/ML Subcutaneous Solution Pen-injector (Taking) USE AS DIRECTED BEFORE MEALS UP TO TOTAL DAILY DOSE  UNITS    Before each meal, 20-30u before each meal, three times daily       metFORMIN  MG Oral Tablet 24 Hr (Taking) Take 2 tablets (1,000 mg total) by mouth daily with breakfast AND 1 tablet (500 mg total) daily with dinner.      insulin glargine (LANTUS SOLOSTAR) 100 UNIT/ML Subcutaneous Solution Pen-injector (Taking) Inject 40 Units into the skin 2 (two) times daily. Inject up to 80 units daily    empagliflozin (JARDIANCE) 10 MG Oral Tab (Taking) Take 1 tablet (10 mg total) by mouth daily.            History/Other:    Allergies, PMH, SocHx and FHx reviewed and updated as appropriate in Epic on    RYBELSUS 14 MG Oral Tab Take 1 tablet by mouth daily.      metFORMIN ER  500 MG Oral Tablet 24 Hr Take 2 tablets (1,000 mg total) by mouth daily with breakfast AND 2 tablets (1,000 mg total) daily with dinner.      Insulin Lispro, 1 Unit Dial, 100 UNIT/ML Subcutaneous Solution Pen-injector Take 30u-35u before each meal. USE AS DIRECTED BEFORE MEALS UP TO TOTAL DAILY DOSE  UNITS      empagliflozin (JARDIANCE) 10 MG Oral Tab Take 1 tablet (10 mg total) by mouth daily. 90 tablet 3    SPIRONOLACTONE 50 MG Oral Tab TAKE 1 TABLET(50 MG) BY MOUTH DAILY 90 tablet 3    Insulin Pen Needle 32G X 4 MM Does not apply Misc Use for insulin  each 2    insulin glargine (LANTUS SOLOSTAR) 100 UNIT/ML Subcutaneous Solution Pen-injector Inject 40 Units into the skin 2 (two) times daily. Inject up to 80 units daily 45 mL 11    atorvastatin (LIPITOR) 10 MG Oral Tab Take 1 tablet (10 mg total) by mouth daily. 90 tablet 3    hydroCHLOROthiazide 25 MG Oral Tab Take 1 tablet (25 mg total) by mouth daily. 90 tablet 3    losartan 100 MG Oral Tab Take 1 tablet (100 mg total) by mouth daily. 90 tablet 3    metoprolol succinate  MG Oral Tablet 24 Hr Take 1 tablet (100 mg total) by mouth daily. 90 tablet 3    Glucose Blood (ONETOUCH VERIO) In Vitro Strip Check glucose at least 4x daily. 100 strip 11    OneTouch Delica Lancets 33G Does not apply Misc Check glucose 4x daily before insulin injections. 100 each 11    AMLODIPINE 10 MG Oral Tab TAKE 1 TABLET(10 MG) BY MOUTH DAILY 90 tablet 3    Insulin Syringe-Needle U-100 31G X 15/64\" 1 ML Does not apply Misc 1 Syringe 3 (three) times daily before meals. 300 each 1    potassium citrate 10 MEQ (1080 MG) Oral Tab CR Take 2 tablets (20 mEq total) by mouth 2 (two) times a day. 180 tablet 3     Allergies[1]  Current Outpatient Medications   Medication Sig Dispense Refill    RYBELSUS 14 MG Oral Tab Take 1 tablet by mouth daily.      metFORMIN  MG Oral Tablet 24 Hr Take 2 tablets (1,000 mg total) by mouth daily with breakfast AND 2 tablets (1,000 mg total)  daily with dinner.      Insulin Lispro, 1 Unit Dial, 100 UNIT/ML Subcutaneous Solution Pen-injector Take 30u-35u before each meal. USE AS DIRECTED BEFORE MEALS UP TO TOTAL DAILY DOSE  UNITS      empagliflozin (JARDIANCE) 10 MG Oral Tab Take 1 tablet (10 mg total) by mouth daily. 90 tablet 3    SPIRONOLACTONE 50 MG Oral Tab TAKE 1 TABLET(50 MG) BY MOUTH DAILY 90 tablet 3    Insulin Pen Needle 32G X 4 MM Does not apply Misc Use for insulin  each 2    insulin glargine (LANTUS SOLOSTAR) 100 UNIT/ML Subcutaneous Solution Pen-injector Inject 40 Units into the skin 2 (two) times daily. Inject up to 80 units daily 45 mL 11    atorvastatin (LIPITOR) 10 MG Oral Tab Take 1 tablet (10 mg total) by mouth daily. 90 tablet 3    hydroCHLOROthiazide 25 MG Oral Tab Take 1 tablet (25 mg total) by mouth daily. 90 tablet 3    losartan 100 MG Oral Tab Take 1 tablet (100 mg total) by mouth daily. 90 tablet 3    metoprolol succinate  MG Oral Tablet 24 Hr Take 1 tablet (100 mg total) by mouth daily. 90 tablet 3    Glucose Blood (ONETOUCH VERIO) In Vitro Strip Check glucose at least 4x daily. 100 strip 11    OneTouch Delica Lancets 33G Does not apply Misc Check glucose 4x daily before insulin injections. 100 each 11    AMLODIPINE 10 MG Oral Tab TAKE 1 TABLET(10 MG) BY MOUTH DAILY 90 tablet 3    Insulin Syringe-Needle U-100 31G X 15/64\" 1 ML Does not apply Misc 1 Syringe 3 (three) times daily before meals. 300 each 1    potassium citrate 10 MEQ (1080 MG) Oral Tab CR Take 2 tablets (20 mEq total) by mouth 2 (two) times a day. 180 tablet 3     Past Medical History:    Calculus of kidney    Diabetes (HCC)    High blood pressure    High serum cortisol    History of blood transfusion     Family History   Problem Relation Age of Onset    Diabetes Mother     Diabetes Sister      Social history: Reviewed.      ROS/Exam    REVIEW OF SYSTEMS: Ten point review of systems has been performed and is otherwise negative and/or  non-contributory, except as described above.     VITALS  Vitals:    03/27/25 1042   BP: 124/88   BP Location: Left arm   Patient Position: Sitting   Cuff Size: adult   Pulse: 76   SpO2: 99%           Wt Readings from Last 6 Encounters:   12/31/24 183 lb (83 kg)   11/04/24 186 lb (84.4 kg)   10/07/24 188 lb 9.6 oz (85.5 kg)   06/25/24 187 lb (84.8 kg)   06/01/24 188 lb 9.6 oz (85.5 kg)   03/12/24 193 lb (87.5 kg)       PHYSICAL EXAM  CONSTITUTIONAL:  awake, alert, cooperative, no apparent distress, and appears stated age    PSYCH: normal affect  LUNGS: breathing comfortably  CARDIOVASCULAR:  regular rate   Diabetes foot exam performed: Bilateral foot exam w/ +dry skin. Monofilament/sensation of bilateral feet is normal. Vibration to dorsum to the first toe perceived bilaterally. Bilateral dorsalis pedis +1. Capillary refill <3 seconds. Foot care practices reviewed with patient.      Labs/Imaging: Pertinent imaging reviewed.    Overall glucose control:  Lab Results   Component Value Date    A1C 7.2 (A) 03/27/2025    A1C 6.6 (A) 10/07/2024    A1C 6.9 (H) 06/01/2024    A1C 7.7 (A) 03/12/2024    A1C 7.4 (H) 11/05/2023       Supplementary Documentation:   -Surveillance for Diabetes Complications & Risks  Foot exam/neuropathy: Last Foot Exam: 03/27/25      Retinopathy screening: Last Dilated Eye Exam: 06/25/24  Eye Exam shows Diabetic Retinopathy?: Yes      Assessment & Plan:     ICD-10-CM    1. Controlled type 2 diabetes mellitus with hyperglycemia, with long-term current use of insulin ([facility])  E11.65 POC Hemoglobin A1C    Z79.4 metFORMIN  MG Oral Tablet 24 Hr     Insulin Lispro, 1 Unit Dial, 100 UNIT/ML Subcutaneous Solution Pen-injector     empagliflozin (JARDIANCE) 10 MG Oral Tab      2. Hypertension associated with type 2 diabetes mellitus ([facility])  E11.59     I15.2       3. Hyperlipidemia associated with type 2 diabetes mellitus ([facility])  E11.69     E78.5       4. Microalbuminuric diabetic nephropathy ([facility])  E11.21        5. Vitamin D deficiency  E55.9       6. High serum cortisol  R79.89           Teresa De Jesus Alanis Reyes is a pleasant 44 year old female here for evaluation of:    #Type 2 Diabetes  PMHx of Type 2 diabetes mellitus diagnosed in 2019.        Last A1c value was 7.2% done 3/27/2025. 6/2024 CBC without anemia. Goal <7%. Importance of better glucose control in preventing onset/progression of end-organ damage discussed, as well as goals of therapy and clinical significance of A1C.    She has a hx of T2DM since 2019 and was started on insulin at that time. Her weight based insulin dosing is around 42-50 units for TDD. She is currently receiving around 3x that dose + oral agents. We reviewed other etiology of diabetes including cushing's. Completed DST, which was abnormal. MS x2  still pending. Having post meal hyperglycemia most likely due to higher carb intake. Increasing prandial fixed dose. Patient not interested in CGM.      Plan:  - med changes:   - incr humalog 20-30u --> 30u consistently before each meal  - continue lantus 40u twice daily  - continue jardiance 10mg daily for now (didn't titrate as she may see drop in GFR then would need to decr metformin)  - continue metformin 1000mg twice daily (reduce dose once GFR <45)  - continue rybelsus 14mg daily       - continue to check BG 2x/day using glucometer or CGM  - SGLT2i instructions provided re: surgery, times of illness/dehydration   - We reviewed GLP-1 action, risk vs benefit, dosing, and potential side effects. Patient denies hx of pancreatitis, gastroparesis, or personal or family hx of medullary thyroid CA or MEN 2.     - Discussed management of low glucose and targeted glucose levels and provided instructions in AVS   -See above header \"Supplementary Documentation\" for surveillance for diabetes complications & risks    Previously trialed:  - trulicity- constipation    #Albuminuria  - DM well controlled  - hx of uncontrolled HTN, now controlled.  Increased malb likely multifactorial.   -Will follow and consider nephrology consult if it continues to remain elevated - reminded to repeat 3/27/2025   Lab Results   Component Value Date    EGFRCR 45 (L) 11/11/2024    MICROALBCREA 2,306.5 (H) 06/01/2024     #Hypertension   - SBP is not to goal <130   -Diagnosed in 2020 when she was admitted with pyelonephritis.   - Family hx of HTN in grandparents. She is currently on 5 anti-hypertensive agents and also has a hx of hypokalemia.   - CT A/P in 2020 without adrenal nodule/mass.  - potassium stable 11/2024  - Per Dr DRAKE was planning on also discuss with PCP regarding switching from spironolactone to hydralazine with plan to complete aldosterone/renin labs 4 weeks after discontinuation of spironolactone   BP Readings from Last 1 Encounters:   03/27/25 124/88   BP Meds: amLODIPine Tabs - 10 MG; hydroCHLOROthiazide Tabs - 25 MG; losartan Tabs - 100 MG; metoprolol succinate ER Tb24 - 100 MG; spironolactone Tabs - 50 MG     #Hyperlipidemia  Lipids: LDL is not to goal   -Repeat lipid panel now and discuss increase to 40 mg daily   Lab Results   Component Value Date     (H) 06/01/2024    TRIG 227 (H) 06/01/2024   Cholesterol Meds: atorvastatin Tabs - 10 MG     #Vitamin D Deficiency   -Hx of vitamin D deficiency since 6/2024. Was previously taking vitamin D supplements but has been off of any vitamin D since 2023   -Continue ergocalciferol 50,000 units weekly x3 months, sent to pharmacy   -Repeat vitamin D after ergo is complete, will place order    -still taking vit D 66025ND weekly right now 3/27/2025 , will complete labs soon   Lab Results   Component Value Date    VITD 11.6 (L) 06/01/2024       #High serum cortisol  12/2024, Elevated cortisol after DST.  CT A/P in 2020 without adrenal nodule/mass. Overdue for [place] salivary cortisol testing, reminded.     Return in about 3 months (around 6/27/2025) for scheduled follow up with Dr. Rivera.    The above plan was discussed  in detail with the patient who verbalized understanding and agreement. A total of 49 minutes was spent today on using  services to obtaining history, reviewing pertinent labs, evaluating patient, providing multiple treatment options, reinforcing diet/exercise and compliance, and completing documentation.         JIE Yoder  [facility]  3/27/2025    In reviewing this note, please be advised that Dragon Voice Recognition software used to dictate the note may have made errors in recognizing some of the words or phrases.     Note to patient: The 21 Century Cures Act makes medical notes like these available to patients in the interest of transparency. However, be advised this is a medical document. It is intended as peer to peer communication. It is written in medical language and may contain abbreviations or verbiage that are unfamiliar. It may appear blunt or direct. Medical documents are intended to carry relevant information, facts as evident, and the clinical opinion of the practitioner.            [1]   Allergies  Allergen Reactions    Peanut Butter Flavor SWELLING     Lips swell

## 2025-03-27 NOTE — PATIENT INSTRUCTIONS
Return Visit:   Return in about 3 months (around 6/27/2025) for scheduled follow up with Dr. Rivera.       - Examen de la vista para la diabetes previsto para reshma    Diabetes:  - Aumente melo dosis de lispro de 20-30 u antes de las comidas a 30 u antes de las comidas de forma dany.  - De lo contrario, mantenga todos los medicamentos para la diabetes iguales (abajo).  - El ácido cheng lipoico puede ser útil para el dolor neuropático diabético; puede vish 600 mg al día (cómprelo en [employer]). También puede consultar con melo médico de cabecera sobre kilo dolor.    Plan:  - Realice candy prueba de cortisol salival a medianoche. Se realizará dos noches seguidas (armando abajo). Obtendrá los hisopos en el laboratorio de la planta baja y se los devolverá al finalizar.  1. Recoja la muestra entre las 23:00 y la medianoche en dos ocasiones distintas, preferiblemente dos noches seguidas, para un total de 2 muestras.  2. No fume ni edd alcohol agustín al menos doce (12) horas antes de la recolección de las muestras.  3. No se cepille los dientes agustín al menos candy (1) hora antes de la recolección de las muestras. 4. NO coma ni edd nada treinta (30) minutos antes de la recolección. Enjuáguese la boca con agua grant (10) minutos antes de la recolección. NO use cremas, lociones ni ungüentos para las cristal antes de recolectar las muestras. 2. Entre las 23:00 y la medianoche, retire el tubo de recolección del recipiente de plástico exterior.  5. Candy vez obtenidos los resultados de estos análisis, mi clínica le mantendrá informado sobre los próximos pasos.    Diabetes:  - increase your lispro from 20-30u before meals to 30u before meals consistently  - otherwise, keep all diabetes medications the same (below)   - alpha lipoic acid can be helpful for diabetic neuropathic pain, you can take 600mg daily, purchase on amazon. You can also follow up with your primary care on this pain    Plan:  - Complete a midnight salivary cortisol  test. You will do this two nights in a row (see below). You will obtain the swabs from the lab downstairs and return it to them once you are done.  Collect sample between 11:00 p.m. and midnight on two separate occasions, preferably two nights in a row, for a total of 2 samples.   Do NOT smoke or drink alcohol for at least twelve (12) hours before collecting the samples.   Do NOT brush your teeth for at least one (1) hour before collecting the samples.   Do NOT eat or drink anything thirty (30) minutes before collection. Rinse your mouth out with water ten (10) minutes before collection. Do NOT use hand creams, lotions, or ointments before collecting the samples. 2. Between 11:00 p.m. and midnight, remove the collection tube from the outer plastic container.  Once these labs result, my clinic will keep you updated with next steps.    Updated diabetes medication instructions from today:   Diabetic Medications               RYBELSUS 14 MG Oral Tab (Taking) Take 1 tablet by mouth daily.    metFORMIN  MG Oral Tablet 24 Hr (Taking) Take 2 tablets (1,000 mg total) by mouth daily with breakfast AND 2 tablets (1,000 mg total) daily with dinner.    Insulin Lispro, 1 Unit Dial, 100 UNIT/ML Subcutaneous Solution Pen-injector (Taking) Take 30u-35u before each meal. USE AS DIRECTED BEFORE MEALS UP TO TOTAL DAILY DOSE  UNITS    empagliflozin (JARDIANCE) 10 MG Oral Tab (Taking) Take 1 tablet (10 mg total) by mouth daily.    insulin glargine (LANTUS SOLOSTAR) 100 UNIT/ML Subcutaneous Solution Pen-injector (Taking) Inject 40 Units into the skin 2 (two) times daily. Inject up to 80 units daily            Lab Results   Component Value Date    A1C 7.2 (A) 03/27/2025    A1C 6.6 (A) 10/07/2024    A1C 6.9 (H) 06/01/2024    A1C 7.7 (A) 03/12/2024    A1C 7.4 (H) 11/05/2023        General follow up information:  Please let us know if you require any refills at least 1 week prior to your medication running out. If you do run out  of medication, please call our office ASAP to request refills (do not wait until your follow up).   Please call our office if sugars at home are consistently greater than 250 or less than 70 for medication adjustment (do not wait until your follow up appointment).  Lab results and imaging will typically be reviewed at follow up appointments, or within 3-5 business days of ALL results being in if you do not have an appointment scheduled in the near future. Our office will contact you for any abnormal results requiring more urgent follow up or action.   The on-call pager is for urgent matters only. If you are a type 1 diabetic and run out of insulin after business hours 8AM-4PM, you may call the on-call pager for a refill to a 24 hour pharmacy.  If you have adrenal insufficiency and run out of steroids, you may call the on-call pager for a refill to a 24 hour pharmacy. All other refill requests should be requested during business hours.    Office phone number: [phone]; phones are open Monday-Friday 8:30-4:30.       HOW TO TREAT LOW BLOOD SUGAR (Hypoglycemia)  Low blood sugar= Less than 70, or if you start to have symptoms (below)  Symptoms: Shaking or trembling, fast heart rate, extreme hunger, sweating, confusion/difficulty concentrating, dizziness.    How to treat a low blood sugar if you are able to eat/drink: The Rule of 15/15  If you are using continuous glucose monitor that says you are low, but you do not have any symptoms, verify on fingerstick that your blood sugar is actually low before treating.   Eat 15 grams of carbs (see examples below)  Check your blood sugar after 15 minutes. If it’s still below your target range, have another serving.   Repeat these steps until it’s in your target range. Once it’s in range, if you're nervous about your sugar going low again, have a protein source (ie, a spoonful of peanut butter).   If you have a CGM you want to look for how your arrow has changed. If you arrow  is pointed up or sideways after 15 min, give your CGM more time OR check with a finger stick. Try not to eat more food until at least 15 min after the first BG check - otherwise you risk having a rebound high.  If you are experiencing symptoms and you are unable to check your blood glucose for any reason, treat the hypoglycemia.  If someone has a low blood sugar and is unconscious: Don’t hesitate to call 911. If someone is unconscious and glucagon is not available or someone does not know how to use it, call 911 immediately.     To treat a low, I recommend you carry with you easy, pre-portioned treatment for low blood sugars that are 15G of carbs:   - Children sized squeeze pouch applesauce (high fiber + carbs help prevent too high of a spike)  - Small children's sized juicebox- 15g carb --> 4oz juice box  - Glucose tablets from [employer], you can find them near diabetes supplies --> Note, you will need to eat 3-4 tablets to get to 15g of carbs  - Children sized fruit snack pack- look for one with 15 grams of total carbohydrate  - Choice of how to treat your low is important. Complex carbs, or foods that contain fats along with carbs (like chocolate) can slow the absorption of glucose and should NOT be used to treat an emergency low

## 2025-04-04 PROBLEM — N18.32 STAGE 3B CHRONIC KIDNEY DISEASE (HCC): Status: ACTIVE | Noted: 2025-04-04

## 2025-04-05 NOTE — ASSESSMENT & PLAN NOTE
Last Glomerular Filtration Rate: CKD 3b (GFR 40). .   3/27/2025: Creatinine 1.60 (H) CKD etiologies : Diabetes and Hypertension  Plan/Management: Control Hypertension/Diabetes stable, continue present management and continue to monitor for progression      Orders:    TSH and Free T4; Future; Expected date: 07/01/2025

## 2025-04-05 NOTE — ASSESSMENT & PLAN NOTE
BP shows good control with last BP of 134/78. Continue lifestyle changes, diet, exercise and weight loss.   3/27/2025: Potassium 4.2; Creatinine 1.60 (H); eGFR-Cr 40 (L)  BP Meds: amLODIPine Tabs - 10 MG; hydroCHLOROthiazide Tabs - 25 MG; losartan Tabs - 100 MG; metoprolol succinate ER Tb24 - 100 MG; spironolactone Tabs - 50 MG   ACE/ARB Adherence Poor at 49% encouraged better adherence and 90 day refills.       Orders:    hydroCHLOROthiazide; Take 1 tablet (25 mg total) by mouth daily.  Dispense: 90 tablet; Refill: 3    TSH and Free T4; Future; Expected date: 07/01/2025

## 2025-04-05 NOTE — ASSESSMENT & PLAN NOTE
Cholesterol shows Good control. Long term heart-healthy diet and lifestyle discussed and encouraged to reduce risk of cardiovascular disease.  3/27/2025: Cholesterol, Total 189; HDL Cholesterol 40; Triglycerides 217 (H); LDL Cholesterol 111 (H)  Cholesterol Meds: rosuvastatin Tabs - 5 MG  stable  Continue with current treatment plan     Orders:    Rosuvastatin Calcium; Take 1 tablet (5 mg total) by mouth nightly.  Dispense: 90 tablet; Refill: 3

## 2025-04-05 NOTE — ASSESSMENT & PLAN NOTE
Diabetes: On Insulin.   A1c is 7.8 done 3/27/2025 which shows hyperglycemia and borderline control, maintain vigilance and increase activity and medications as listed.   DM Meds: empagliflozin Tabs - 10 MG; Insulin Lispro (1 Unit Dial) Sopn - 100 UNIT/ML; Lantus SoloStar Sopn - 100 UNIT/ML; metFORMIN ER Tb24 - 500 MG; Rybelsus Tabs - 14 MG      Diabetic Complications: Hyperglycemia: A1c 7.8% 3/27/2025, .  Hypertriglyceridemia: 217, 3/27/2025.  CKD 3b (GFR 40).    Diabetes is : improving with treatment Continue current treatment regimen. Reassess Diabetes in : in 4 months     Orders:    Rybelsus; Take 1 tablet by mouth daily.  Dispense: 30 tablet; Refill: 0    Rosuvastatin Calcium; Take 1 tablet (5 mg total) by mouth nightly.  Dispense: 90 tablet; Refill: 3    Hemoglobin A1C; Future; Expected date: 07/01/2025    Comp Metabolic Panel (14); Future; Expected date: 07/01/2025

## 2025-04-07 ENCOUNTER — OFFICE VISIT (OUTPATIENT)
Dept: FAMILY MEDICINE CLINIC | Facility: CLINIC | Age: 45
End: 2025-04-07
Payer: MEDICAID

## 2025-04-07 VITALS
BODY MASS INDEX: 31.09 KG/M2 | DIASTOLIC BLOOD PRESSURE: 78 MMHG | SYSTOLIC BLOOD PRESSURE: 134 MMHG | HEIGHT: 65 IN | WEIGHT: 186.63 LBS | RESPIRATION RATE: 14 BRPM | HEART RATE: 80 BPM

## 2025-04-07 DIAGNOSIS — E11.65 CONTROLLED TYPE 2 DIABETES MELLITUS WITH HYPERGLYCEMIA, WITH LONG-TERM CURRENT USE OF INSULIN (HCC): ICD-10-CM

## 2025-04-07 DIAGNOSIS — Z79.4 CONTROLLED TYPE 2 DIABETES MELLITUS WITH HYPERGLYCEMIA, WITH LONG-TERM CURRENT USE OF INSULIN (HCC): ICD-10-CM

## 2025-04-07 DIAGNOSIS — I10 ESSENTIAL HYPERTENSION: ICD-10-CM

## 2025-04-07 DIAGNOSIS — N18.32 STAGE 3B CHRONIC KIDNEY DISEASE (HCC): ICD-10-CM

## 2025-04-07 DIAGNOSIS — E28.39 HYPOGONADISM, OVARIAN: ICD-10-CM

## 2025-04-07 DIAGNOSIS — E28.2 PCOS (POLYCYSTIC OVARIAN SYNDROME): ICD-10-CM

## 2025-04-07 DIAGNOSIS — Z12.11 SCREENING FOR COLON CANCER: ICD-10-CM

## 2025-04-07 DIAGNOSIS — E78.2 MIXED HYPERLIPIDEMIA: ICD-10-CM

## 2025-04-07 DIAGNOSIS — Z00.00 ANNUAL PHYSICAL EXAM: Primary | ICD-10-CM

## 2025-04-07 DIAGNOSIS — Z12.31 VISIT FOR SCREENING MAMMOGRAM: ICD-10-CM

## 2025-04-07 PROCEDURE — 99396 PREV VISIT EST AGE 40-64: CPT | Performed by: FAMILY MEDICINE

## 2025-04-07 RX ORDER — HYDROCHLOROTHIAZIDE 25 MG/1
25 TABLET ORAL DAILY
Qty: 90 TABLET | Refills: 3 | Status: SHIPPED | OUTPATIENT
Start: 2025-04-07

## 2025-04-07 RX ORDER — ORAL SEMAGLUTIDE 14 MG/1
1 TABLET ORAL DAILY
Qty: 30 TABLET | Refills: 0 | Status: SHIPPED | OUTPATIENT
Start: 2025-04-07

## 2025-04-07 RX ORDER — ROSUVASTATIN CALCIUM 5 MG/1
5 TABLET, COATED ORAL NIGHTLY
Qty: 90 TABLET | Refills: 3 | Status: SHIPPED | OUTPATIENT
Start: 2025-04-07

## 2025-04-07 NOTE — PROGRESS NOTES
The following individual(s) verbally consented to be recorded using ambient AI listening technology and understand that they can each withdraw their consent to this listening technology at any point by asking the clinician to turn off or pause the recording:    Patient name: Teresa De Jesus Alanis Reyes  Additional names:  jonah chapman

## 2025-04-07 NOTE — PROGRESS NOTES
Teresa De Jesus Alanis Reyes is a 44 year old female who presents for a complete physical exam.     had concerns including Physical (WWE, no pap).   No topic due editable text      Subjective:    History of Present Illness  The patient is a 43 year old with diabetes who presents for an annual physical exam. She is accompanied by a family member who assists with translation.    She has a history of diabetes and recently visited a diabetes specialist who suggested her symptoms might be related to diabetic neuropathy. She is currently taking Rybelsus and hydrochlorothiazide.     She experienced tingling in her feet and legs at night while taking atorvastatin, which resolved after discontinuing the medication. She has not experienced these symptoms since stopping the atorvastatin.    She inquires about elevated hormone levels, specifically testosterone, and mentions a history of polycystic ovary syndrome (PCOS). She is currently on 50 mg of spironolactone, which was started three years ago. She has a history of irregular menstrual cycles.    She mentions a past abnormal Pap smear and is part of a program that provides free follow-up testing. She recalls having a Pap smear last year due to previous irregularities.    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     Thinks testosterone ewe up and estradiol low in recent   Symptoms: periods are regular. Patient complains of dogin well. Last A1c value was 7.8% done 3/27/2025. .   Tobacco:  She has never smoked tobacco.       Wt Readings from Last 4 Encounters:   04/07/25 186 lb 9.6 oz (84.6 kg)   12/31/24 183 lb (83 kg)   11/04/24 186 lb (84.4 kg)   10/07/24 188 lb 9.6 oz (85.5 kg)     Body mass index is 31.05 kg/m².     The 10-year ASCVD risk score (Josef WIGGINS, et al., 2019) is: 3.7%    Values used to calculate the score:      Age: 44 years      Sex: Female      Is Non- : No      Diabetic: Yes      Tobacco  smoker: No      Systolic Blood Pressure: 134 mmHg      Is BP treated: Yes      HDL Cholesterol: 40 mg/dL      Total Cholesterol: 189 mg/dL    Chief Complaint Reviewed and Verified  Nursing Notes Reviewed and   Verified  Tobacco Reviewed  Allergies Reviewed  Medications Reviewed    Problem List Reviewed  Medical History Reviewed  Surgical History   Reviewed  OB Status Reviewed  Family History Reviewed          Her family history includes Diabetes in her mother and sister.   She  reports that she has never smoked. She has never used smokeless tobacco. She reports that she does not drink alcohol and does not use drugs.    Exercise: minimal.  Diet: watches fats closely    GYNE HISTORY: Menstrual History:  OB History    Para Term  AB Living   2 1 0 0 1 0   SAB IAB Ectopic Multiple Live Births   1 0 0 0 0      Menarche age:    No LMP recorded.       Health Maintenance Due   Topic Date Due    Colorectal Cancer Screening  Never done    Annual Physical  Never done    Pneumococcal Vaccine: Birth to 50yrs (1 of 2 - PCV) Never done    Pap Smear  Never done    Mammogram  2024    COVID-19 Vaccine (2024- season) 2024    Annual Depression Screening  2025    Diabetes Care Dilated Eye Exam  2025         Review of Systems   Constitutional: Negative.  Negative for activity change, appetite change, chills and fever.   HENT: Negative.     Eyes: Negative.    Respiratory: Negative.  Negative for shortness of breath.    Cardiovascular: Negative.  Negative for chest pain and palpitations.   Gastrointestinal: Negative.  Negative for abdominal pain.   Genitourinary: Negative.  Negative for dysuria.   Musculoskeletal:  Negative for arthralgias.   Skin: Negative.  Negative for rash.   Allergic/Immunologic: Negative.    Neurological: Negative.         Results:    Lab Results   Component Value Date/Time    WBC 12.4 (H) 2024 11:13 AM    HGB 14.6 2024 11:13 AM    .0 2024  11:13 AM      Lab Results   Component Value Date/Time     (H) 03/27/2025 11:42 AM     03/27/2025 11:42 AM    K 4.2 03/27/2025 11:42 AM     03/27/2025 11:42 AM    CO2 24.0 03/27/2025 11:42 AM    CREATSERUM 1.60 (H) 03/27/2025 11:42 AM    CA 9.9 03/27/2025 11:42 AM    ALB 5.0 (H) 03/27/2025 11:42 AM    TP 8.4 (H) 03/27/2025 11:42 AM    ALKPHO 69 03/27/2025 11:42 AM    AST 15 03/27/2025 11:42 AM    ALT 14 03/27/2025 11:42 AM    BILT 0.4 03/27/2025 11:42 AM    TSH 0.797 06/01/2024 11:13 AM        Lab Results   Component Value Date/Time    CHOLEST 189 03/27/2025 11:42 AM    HDL 40 03/27/2025 11:42 AM    TRIG 217 (H) 03/27/2025 11:42 AM     (H) 03/27/2025 11:42 AM    NONHDLC 149 (H) 03/27/2025 11:42 AM       Last A1c value was 7.8% done 3/27/2025.     6/1/2024: Vitamin D, 25OH, Total 11.6 (L)      Results  LABS  Cortisol: 9 mcg/dL     Objective:    EXAM:  /78   Pulse 80   Resp 14   Ht 5' 5\" (1.651 m)   Wt 186 lb 9.6 oz (84.6 kg)   BMI 31.05 kg/m²  Estimated body mass index is 31.05 kg/m² as calculated from the following:    Height as of this encounter: 5' 5\" (1.651 m).    Weight as of this encounter: 186 lb 9.6 oz (84.6 kg).   Physical Exam  Vitals and nursing note reviewed.   Constitutional:       General: She is not in acute distress.     Appearance: Normal appearance. She is obese.   HENT:      Head: Normocephalic and atraumatic.      Right Ear: Tympanic membrane and external ear normal.      Left Ear: Tympanic membrane and external ear normal.      Nose: Nose normal.      Mouth/Throat:      Mouth: Mucous membranes are moist.   Eyes:      Extraocular Movements: Extraocular movements intact.      Pupils: Pupils are equal, round, and reactive to light.   Cardiovascular:      Rate and Rhythm: Normal rate and regular rhythm.      Pulses: Normal pulses.           Carotid pulses are 2+ on the right side and 2+ on the left side.       Radial pulses are 2+ on the right side and 2+ on the  left side.        Dorsalis pedis pulses are 2+ on the right side and 2+ on the left side.        Posterior tibial pulses are 2+ on the right side and 2+ on the left side.      Heart sounds: Normal heart sounds, S1 normal and S2 normal. No murmur heard.  Pulmonary:      Effort: Pulmonary effort is normal.      Breath sounds: Normal breath sounds.   Abdominal:      General: Abdomen is flat. Bowel sounds are normal. There is no distension.      Palpations: Abdomen is soft.   Musculoskeletal:         General: Normal range of motion.      Cervical back: Normal range of motion and neck supple.      Right lower leg: No edema.      Left lower leg: No edema.      Right foot: No deformity.      Left foot: No deformity.   Feet:      Right foot:      Protective Sensation: 6 sites tested.  6 sites sensed.      Skin integrity: Skin integrity normal. No ulcer.      Left foot:      Protective Sensation: 6 sites tested.  6 sites sensed.      Skin integrity: Skin integrity normal. No ulcer.   Skin:     General: Skin is warm and dry.      Capillary Refill: Capillary refill takes less than 2 seconds.   Neurological:      General: No focal deficit present.      Mental Status: She is alert and oriented to person, place, and time.   Psychiatric:         Mood and Affect: Mood normal.         Behavior: Behavior normal.         Thought Content: Thought content normal.        Physical Exam  CHEST: Clear to auscultation bilaterally.  CARDIOVASCULAR: Normal heart sounds.  EXTREMITIES: Sensation intact.  MUSCULOSKELETAL: Legs tight.       Assessment & Plan:    Teresa De Jesus Alanis Reyes is a 44 year old female who presents for a complete physical exam.   Pt's weight is Body mass index is 31.05 kg/m²., recommended low fat diet and aerobic exercise 30 minutes three times weekly.   Health maintenance, Up to date    Immunizations: Up to date - PCV 20 next visit  Immunization History   Administered Date(s) Administered    Covid-19 Vaccine Pfizer 30  mcg/0.3 ml 04/02/2021, 04/24/2021, 12/08/2021    FLULAVAL 6 months & older 0.5 ml Prefilled syringe ([id number]) 10/16/2020, 12/21/2022, 11/27/2023    TDAP 11/27/2020         Pt info given for: exercise, low fat diet, The patient indicates understanding of these issues and agrees to the plan.  The patient is asked to return for CPX in 1 years.    Assessment & Plan  Annual physical exam         Controlled type 2 diabetes mellitus with hyperglycemia, with long-term current use of insulin (HCC)  Diabetes: On Insulin.   A1c is 7.8 done 3/27/2025 which shows hyperglycemia and borderline control, maintain vigilance and increase activity and medications as listed.   DM Meds: empagliflozin Tabs - 10 MG; Insulin Lispro (1 Unit Dial) Sopn - 100 UNIT/ML; Lantus SoloStar Sopn - 100 UNIT/ML; metFORMIN ER Tb24 - 500 MG; Rybelsus Tabs - 14 MG      Diabetic Complications: Hyperglycemia: A1c 7.8% 3/27/2025, .  Hypertriglyceridemia: 217, 3/27/2025.  CKD 3b (GFR 40).    Diabetes is : improving with treatment Continue current treatment regimen. Reassess Diabetes in : in 4 months     Orders:    Rybelsus; Take 1 tablet by mouth daily.  Dispense: 30 tablet; Refill: 0    Rosuvastatin Calcium; Take 1 tablet (5 mg total) by mouth nightly.  Dispense: 90 tablet; Refill: 3    Hemoglobin A1C; Future; Expected date: 07/01/2025    Comp Metabolic Panel (14); Future; Expected date: 07/01/2025    Essential hypertension  BP shows good control with last BP of 134/78. Continue lifestyle changes, diet, exercise and weight loss.   3/27/2025: Potassium 4.2; Creatinine 1.60 (H); eGFR-Cr 40 (L)  BP Meds: amLODIPine Tabs - 10 MG; hydroCHLOROthiazide Tabs - 25 MG; losartan Tabs - 100 MG; metoprolol succinate ER Tb24 - 100 MG; spironolactone Tabs - 50 MG   ACE/ARB Adherence Poor at 49% encouraged better adherence and 90 day refills.       Orders:    hydroCHLOROthiazide; Take 1 tablet (25 mg total) by mouth daily.  Dispense: 90 tablet; Refill: 3    TSH and  Free T4; Future; Expected date: 07/01/2025    Mixed hyperlipidemia  Cholesterol shows Good control. Long term heart-healthy diet and lifestyle discussed and encouraged to reduce risk of cardiovascular disease.  3/27/2025: Cholesterol, Total 189; HDL Cholesterol 40; Triglycerides 217 (H); LDL Cholesterol 111 (H)  Cholesterol Meds: rosuvastatin Tabs - 5 MG  stable  Continue with current treatment plan     Orders:    Rosuvastatin Calcium; Take 1 tablet (5 mg total) by mouth nightly.  Dispense: 90 tablet; Refill: 3    Stage 3b chronic kidney disease (HCC)  Last Glomerular Filtration Rate: CKD 3b (GFR 40). .   3/27/2025: Creatinine 1.60 (H) CKD etiologies : Diabetes and Hypertension  Plan/Management: Control Hypertension/Diabetes stable, continue present management and continue to monitor for progression      Orders:    TSH and Free T4; Future; Expected date: 07/01/2025    PCOS (polycystic ovarian syndrome)    Orders:    FSH; Future; Expected date: 07/01/2025    LH (Luteinizing Hormone); Future; Expected date: 07/01/2025    Estradiol; Future; Expected date: 07/01/2025    Testosterone,Total and Weakly Bound w/ SHBG; Future; Expected date: 07/01/2025    Hypogonadism, ovarian    Orders:    CBC With Differential With Platelet; Future; Expected date: 07/01/2025    FSH; Future; Expected date: 07/01/2025    LH (Luteinizing Hormone); Future; Expected date: 07/01/2025    Estradiol; Future; Expected date: 07/01/2025    Testosterone,Total and Weakly Bound w/ SHBG; Future; Expected date: 07/01/2025    Screening for colon cancer    Orders:    Occult Blood, Fecal, FIT Immunoassay; Future; Expected date: 04/07/2025    Visit for screening mammogram    Orders:    3D Mammogram Digital Screen, Bilateral (CPT=77067/29308); Future; Expected date: 04/07/2025      Assessment & Plan  Type 2 Diabetes Mellitus  She has type 2 diabetes mellitus, currently managed with Rybelsus. Discussed cost-effective alternatives, but she prefers to continue  with Rybelsus due to its oral administration. Weight loss noted, advised against dose increase if effective.  - Continue Rybelsus as prescribed.  - Monitor weight and blood glucose levels.    Hyperlipidemia  Tingling in feet and legs resolved after stopping atorvastatin, indicating it as the cause. Rosuvastatin 5 mg suggested. CoQ10 discussed for muscle side effects.  - Discontinue atorvastatin.  - Prescribe rosuvastatin 5 mg daily.  - Consider CoQ10 supplement for muscle aches.    Polycystic Ovary Syndrome (PCOS)  She is on spironolactone 50 mg for PCOS. Discussed hormonal imbalance and fertility options. Clomid mentioned for fertility. Monitored kidney function and potassium levels due to spironolactone.  - Continue spironolactone 50 mg daily.  - Monitor kidney function and potassium levels.  - Consider Clomid for fertility after the fifth day of menstruation.    General Health Maintenance  Due for routine screenings. Discussed colon cancer screening and emphasized mammograms for early breast cancer detection.  - Order colon cancer screening test (stool-based).  - Order mammogram for breast cancer screening.    Follow-up  Outlined follow-up plans for health monitoring and treatment adjustments. Discussed necessary lab tests for diabetes and hormonal levels.  - Schedule follow-up appointment in 3 months for diabetes management and lab tests.  - Order lab tests including A1c, CMP, estradiol, and testosterone in 3 months.      I have discontinued Teresa D. Alanis Reyes's atorvastatin. I am also having her start on rosuvastatin. Additionally, I am having her maintain her potassium citrate, Insulin Syringe-Needle U-100, amLODIPine, OneTouch Verio, OneTouch Delica Lancets 33G, losartan, metoprolol succinate ER, Lantus SoloStar, Insulin Pen Needle, spironolactone, metFORMIN ER, Insulin Lispro (1 Unit Dial), empagliflozin, hydroCHLOROthiazide, and Rybelsus.     Return in about 3 months (around 7/7/2025) for diabetes  follow up.

## 2025-04-13 ENCOUNTER — APPOINTMENT (OUTPATIENT)
Dept: LAB | Age: 45
End: 2025-04-13
Attending: STUDENT IN AN ORGANIZED HEALTH CARE EDUCATION/TRAINING PROGRAM
Payer: MEDICAID

## 2025-04-13 PROCEDURE — 82533 TOTAL CORTISOL: CPT

## 2025-04-14 PROCEDURE — 82533 TOTAL CORTISOL: CPT

## 2025-04-15 ENCOUNTER — LAB ENCOUNTER (OUTPATIENT)
Dept: LAB | Age: 45
End: 2025-04-15
Attending: STUDENT IN AN ORGANIZED HEALTH CARE EDUCATION/TRAINING PROGRAM
Payer: MEDICAID

## 2025-04-15 DIAGNOSIS — R79.89 HIGH SERUM CORTISOL: ICD-10-CM

## 2025-04-21 LAB — SALIVARY CORTISOL MS: 0.1 UG/DL

## 2025-04-22 LAB — SALIVARY CORTISOL MS: 0.04 UG/DL

## 2025-05-20 DIAGNOSIS — I10 ESSENTIAL HYPERTENSION: ICD-10-CM

## 2025-05-20 RX ORDER — LOSARTAN POTASSIUM 100 MG/1
100 TABLET ORAL DAILY
Qty: 90 TABLET | Refills: 3 | Status: SHIPPED | OUTPATIENT
Start: 2025-05-20

## 2025-05-28 DIAGNOSIS — E11.65 CONTROLLED TYPE 2 DIABETES MELLITUS WITH HYPERGLYCEMIA, WITH LONG-TERM CURRENT USE OF INSULIN (HCC): ICD-10-CM

## 2025-05-28 DIAGNOSIS — Z79.4 CONTROLLED TYPE 2 DIABETES MELLITUS WITH HYPERGLYCEMIA, WITH LONG-TERM CURRENT USE OF INSULIN (HCC): ICD-10-CM

## 2025-05-29 RX ORDER — ORAL SEMAGLUTIDE 14 MG/1
1 TABLET ORAL DAILY
Qty: 30 TABLET | Refills: 11 | Status: SHIPPED | OUTPATIENT
Start: 2025-05-29

## 2025-05-29 NOTE — TELEPHONE ENCOUNTER
Requested Prescriptions     Pending Prescriptions Disp Refills    RYBELSUS 14 MG Oral Tab [Pharmacy Med Name: RYBELSUS 14MG TABLETS] 30 tablet 0     Sig: TAKE 1 TABLET BY MOUTH DAILY        Last refill: 4/07/25 30  0 refills    Last Appointment: LOV 4/7/2025     Next Appointment: 7/14/2025 Emeka Bryson MD

## 2025-06-26 ENCOUNTER — LAB ENCOUNTER (OUTPATIENT)
Dept: LAB | Age: 45
End: 2025-06-26
Attending: STUDENT IN AN ORGANIZED HEALTH CARE EDUCATION/TRAINING PROGRAM
Payer: MEDICAID

## 2025-06-26 ENCOUNTER — TELEPHONE (OUTPATIENT)
Facility: CLINIC | Age: 45
End: 2025-06-26

## 2025-06-26 ENCOUNTER — OFFICE VISIT (OUTPATIENT)
Facility: CLINIC | Age: 45
End: 2025-06-26
Payer: MEDICAID

## 2025-06-26 VITALS
HEART RATE: 67 BPM | DIASTOLIC BLOOD PRESSURE: 80 MMHG | BODY MASS INDEX: 30.99 KG/M2 | OXYGEN SATURATION: 97 % | WEIGHT: 186 LBS | HEIGHT: 65 IN | SYSTOLIC BLOOD PRESSURE: 140 MMHG

## 2025-06-26 DIAGNOSIS — E28.39 HYPOGONADISM, OVARIAN: ICD-10-CM

## 2025-06-26 DIAGNOSIS — E11.29 TYPE 2 DIABETES MELLITUS WITH MICROALBUMINURIA, WITH LONG-TERM CURRENT USE OF INSULIN (HCC): ICD-10-CM

## 2025-06-26 DIAGNOSIS — E11.65 TYPE 2 DIABETES MELLITUS WITH HYPERGLYCEMIA, WITH LONG-TERM CURRENT USE OF INSULIN (HCC): ICD-10-CM

## 2025-06-26 DIAGNOSIS — E11.65 CONTROLLED TYPE 2 DIABETES MELLITUS WITH HYPERGLYCEMIA, WITH LONG-TERM CURRENT USE OF INSULIN (HCC): ICD-10-CM

## 2025-06-26 DIAGNOSIS — E78.5 HYPERLIPIDEMIA ASSOCIATED WITH TYPE 2 DIABETES MELLITUS (HCC): ICD-10-CM

## 2025-06-26 DIAGNOSIS — Z79.4 CONTROLLED TYPE 2 DIABETES MELLITUS WITH HYPERGLYCEMIA, WITH LONG-TERM CURRENT USE OF INSULIN (HCC): ICD-10-CM

## 2025-06-26 DIAGNOSIS — Z79.4 TYPE 2 DIABETES MELLITUS WITH HYPERGLYCEMIA, WITH LONG-TERM CURRENT USE OF INSULIN (HCC): ICD-10-CM

## 2025-06-26 DIAGNOSIS — N18.32 STAGE 3B CHRONIC KIDNEY DISEASE (HCC): ICD-10-CM

## 2025-06-26 DIAGNOSIS — Z79.4 TYPE 2 DIABETES MELLITUS WITH MICROALBUMINURIA, WITH LONG-TERM CURRENT USE OF INSULIN (HCC): ICD-10-CM

## 2025-06-26 DIAGNOSIS — R80.9 TYPE 2 DIABETES MELLITUS WITH MICROALBUMINURIA, WITH LONG-TERM CURRENT USE OF INSULIN (HCC): ICD-10-CM

## 2025-06-26 DIAGNOSIS — E28.2 PCOS (POLYCYSTIC OVARIAN SYNDROME): ICD-10-CM

## 2025-06-26 DIAGNOSIS — E11.21 MICROALBUMINURIC DIABETIC NEPHROPATHY (HCC): Primary | ICD-10-CM

## 2025-06-26 DIAGNOSIS — I10 ESSENTIAL HYPERTENSION: ICD-10-CM

## 2025-06-26 DIAGNOSIS — E11.69 HYPERLIPIDEMIA ASSOCIATED WITH TYPE 2 DIABETES MELLITUS (HCC): ICD-10-CM

## 2025-06-26 DIAGNOSIS — E55.9 VITAMIN D DEFICIENCY: ICD-10-CM

## 2025-06-26 LAB
ALBUMIN SERPL-MCNC: 5 G/DL (ref 3.2–4.8)
ALBUMIN/GLOB SERPL: 1.5 {RATIO} (ref 1–2)
ALP LIVER SERPL-CCNC: 70 U/L (ref 39–100)
ALT SERPL-CCNC: 20 U/L (ref 10–49)
ANION GAP SERPL CALC-SCNC: 11 MMOL/L (ref 0–18)
AST SERPL-CCNC: 19 U/L (ref ?–34)
BASOPHILS # BLD AUTO: 0.05 X10(3) UL (ref 0–0.2)
BASOPHILS NFR BLD AUTO: 0.6 %
BILIRUB SERPL-MCNC: 0.4 MG/DL (ref 0.3–1.2)
BUN BLD-MCNC: 26 MG/DL (ref 9–23)
CALCIUM BLD-MCNC: 10.1 MG/DL (ref 8.7–10.6)
CHLORIDE SERPL-SCNC: 103 MMOL/L (ref 98–112)
CO2 SERPL-SCNC: 24 MMOL/L (ref 21–32)
CREAT BLD-MCNC: 1.51 MG/DL (ref 0.55–1.02)
EGFRCR SERPLBLD CKD-EPI 2021: 43 ML/MIN/1.73M2 (ref 60–?)
EOSINOPHIL # BLD AUTO: 0.33 X10(3) UL (ref 0–0.7)
EOSINOPHIL NFR BLD AUTO: 3.8 %
ERYTHROCYTE [DISTWIDTH] IN BLOOD BY AUTOMATED COUNT: 14.1 %
EST. AVERAGE GLUCOSE BLD GHB EST-MCNC: 180 MG/DL (ref 68–126)
ESTRADIOL SERPL-MCNC: 45.6 PG/ML
FASTING STATUS PATIENT QL REPORTED: NO
FSH SERPL-ACNC: 25.5 MIU/ML
GLOBULIN PLAS-MCNC: 3.4 G/DL (ref 2–3.5)
GLUCOSE BLD-MCNC: 161 MG/DL (ref 70–99)
HBA1C MFR BLD: 7.9 % (ref ?–5.7)
HCT VFR BLD AUTO: 42.9 % (ref 35–48)
HEMOGLOBIN A1C: 7.7 % (ref 4.3–5.6)
HGB BLD-MCNC: 14.1 G/DL (ref 12–16)
IMM GRANULOCYTES # BLD AUTO: 0.05 X10(3) UL (ref 0–1)
IMM GRANULOCYTES NFR BLD: 0.6 %
LH SERPL-ACNC: 15.5 MIU/ML
LYMPHOCYTES # BLD AUTO: 2.34 X10(3) UL (ref 1–4)
LYMPHOCYTES NFR BLD AUTO: 26.8 %
MCH RBC QN AUTO: 29.2 PG (ref 26–34)
MCHC RBC AUTO-ENTMCNC: 32.9 G/DL (ref 31–37)
MCV RBC AUTO: 88.8 FL (ref 80–100)
MONOCYTES # BLD AUTO: 0.66 X10(3) UL (ref 0.1–1)
MONOCYTES NFR BLD AUTO: 7.6 %
NEUTROPHILS # BLD AUTO: 5.31 X10 (3) UL (ref 1.5–7.7)
NEUTROPHILS # BLD AUTO: 5.31 X10(3) UL (ref 1.5–7.7)
NEUTROPHILS NFR BLD AUTO: 60.6 %
OSMOLALITY SERPL CALC.SUM OF ELEC: 294 MOSM/KG (ref 275–295)
PLATELET # BLD AUTO: 296 10(3)UL (ref 150–450)
POTASSIUM SERPL-SCNC: 4.2 MMOL/L (ref 3.5–5.1)
PROT SERPL-MCNC: 8.4 G/DL (ref 5.7–8.2)
RBC # BLD AUTO: 4.83 X10(6)UL (ref 3.8–5.3)
SODIUM SERPL-SCNC: 138 MMOL/L (ref 136–145)
T4 FREE SERPL-MCNC: 1.4 NG/DL (ref 0.8–1.7)
TSI SER-ACNC: 1.62 UIU/ML (ref 0.55–4.78)
VIT D+METAB SERPL-MCNC: 16.7 NG/ML (ref 30–100)
WBC # BLD AUTO: 8.7 X10(3) UL (ref 4–11)

## 2025-06-26 PROCEDURE — 99214 OFFICE O/P EST MOD 30 MIN: CPT | Performed by: STUDENT IN AN ORGANIZED HEALTH CARE EDUCATION/TRAINING PROGRAM

## 2025-06-26 PROCEDURE — 80053 COMPREHEN METABOLIC PANEL: CPT

## 2025-06-26 PROCEDURE — 82670 ASSAY OF TOTAL ESTRADIOL: CPT

## 2025-06-26 PROCEDURE — 84439 ASSAY OF FREE THYROXINE: CPT

## 2025-06-26 PROCEDURE — 84443 ASSAY THYROID STIM HORMONE: CPT

## 2025-06-26 PROCEDURE — 82306 VITAMIN D 25 HYDROXY: CPT

## 2025-06-26 PROCEDURE — 83002 ASSAY OF GONADOTROPIN (LH): CPT

## 2025-06-26 PROCEDURE — 83036 HEMOGLOBIN GLYCOSYLATED A1C: CPT | Performed by: STUDENT IN AN ORGANIZED HEALTH CARE EDUCATION/TRAINING PROGRAM

## 2025-06-26 PROCEDURE — 84410 TESTOSTERONE BIOAVAILABLE: CPT

## 2025-06-26 PROCEDURE — 36415 COLL VENOUS BLD VENIPUNCTURE: CPT

## 2025-06-26 PROCEDURE — 83001 ASSAY OF GONADOTROPIN (FSH): CPT

## 2025-06-26 PROCEDURE — 85025 COMPLETE CBC W/AUTO DIFF WBC: CPT

## 2025-06-26 PROCEDURE — 83036 HEMOGLOBIN GLYCOSYLATED A1C: CPT

## 2025-06-26 RX ORDER — INSULIN LISPRO 100 [IU]/ML
INJECTION, SOLUTION INTRAVENOUS; SUBCUTANEOUS
Qty: 30 ML | Refills: 1 | Status: SHIPPED | OUTPATIENT
Start: 2025-06-26

## 2025-06-26 RX ORDER — INSULIN LISPRO 100 [IU]/ML
INJECTION, SOLUTION INTRAVENOUS; SUBCUTANEOUS
Qty: 30 ML | Refills: 1 | Status: SHIPPED | OUTPATIENT
Start: 2025-06-26 | End: 2025-06-26

## 2025-06-26 RX ORDER — INSULIN GLARGINE 100 [IU]/ML
44 INJECTION, SOLUTION SUBCUTANEOUS 2 TIMES DAILY
Qty: 45 ML | Refills: 11 | Status: SHIPPED | OUTPATIENT
Start: 2025-06-26

## 2025-06-26 RX ORDER — ORAL SEMAGLUTIDE 14 MG/1
1 TABLET ORAL DAILY
Qty: 90 TABLET | Refills: 3 | Status: SHIPPED | OUTPATIENT
Start: 2025-06-26

## 2025-06-26 NOTE — PATIENT INSTRUCTIONS
Summary of today's visit:  Medication changes:    Insulin: Subir lantus 40 --> 44 units, humalog 30 --> 34 units antes de comer   Empezar un escala 1 unit para cada 40 puntos arriba de 140 (antes de comer)  Continue metformin y rybelsus   Please schedule your eye exam prior to your follow up visit and have your doctor fax the report to our office. Usted necesita ir al doctor de ojos antes de nuestra proxima visita   Please complete your labs prior to your follow up visit. Completar melo prueba de cortisol        General follow up information:  Please let us know if you require any refills at least 1 week prior to your medication running out. If you do run out of medication, please call our office ASAP to request refills (do not wait until your follow up).   Please call our office if sugars at home are consistently greater than 250 or less than 70 for medication adjustment (do not wait until your follow up appointment).  The on-call pager is for emergencies only. If you are a type 1 diabetic and run out of insulin after business hours 8AM-4PM, you may call the on-call pager for a refill to a 24 hour pharmacy. All other refill requests should be requested during business hours.       Return Visit:  [x]  APN in 3 months  [] Video visit  []  In person only  [x]  Physician in 6 months    []  Directions to 1st floor lab    []  Give blood sugar log  []  PAP paperwork for Ozempic/Jardiance (english/[place])   []  Diabetes Education:    []  Carb Aware T2DM (60)   []  Carb count refresh T1DM (60)   [] CGM start _____________ (30)   []  Pump 101 (60)   []  Schedule with Cole for ___________ (60)   []  DMBO (60)      HOW TO TREAT LOW BLOOD SUGAR (Hypoglycemia)  Low blood sugar = Less than 70, or if you start to have symptoms  Symptoms: Shaking or trembling, fast heart rate, extreme hunger, sweating, confusion/difficulty concentrating, dizziness    How to treat a low blood sugar if you are able to eat/drink: The Rule of 15/15  If  you are using a continuous glucose monitor that says you are low, but you do not have any symptoms, verify on fingerstick that your blood sugar is actually <70 before treating.   Eat 15 grams of carbs (see examples below)  Check your blood sugar after 15 minutes. If it’s still below your target range, have another serving.   Repeat these steps until sugar is >90. Once it’s in range, if you're nervous about your sugar going low again, have a protein source (ie, a spoonful of peanut butter).   If you have a CGM, you want to look for how your arrow has changed. If you arrow is pointed up or sideways after 15 min, give your CGM more time OR check with a finger stick. Try not to eat more food until at least 15 min after the first BG check - otherwise you risk spiking your sugar too high.  If you are experiencing symptoms and you are unable to check your blood glucose for any reason, treat the hypoglycemia.  If someone has a low blood sugar and is unconscious: Don’t hesitate to call 911. Use emergency glucagon. If someone is unconscious and glucagon is not available or someone does not know how to use it, call 911 immediately.     To treat a low, carry with you easy, pre-portioned treatment for low blood sugars that are 15G of carbs:   - Children sized squeeze pouch applesauce (high fiber + carbs help prevent too high of a spike)  - Small children's sized juicebox- 15g carb --> 4oz juice box  - Glucose tablets from [employer], you can find them near diabetes supplies --> Note, you will need to eat 3-4 tablets to get to 15g of carbs  - Child sized fruit snack pack- look for one with 15 grams of total carbohydrate  - Choice of how to treat your low is important. Complex carbs, or foods that contain fats along with carbs (like chocolate) can slow the absorption of glucose and should NOT be used to treat an emergency low.

## 2025-06-26 NOTE — PROGRESS NOTES
[facility] Note    Name: Teresa De Jesus Alanis Reyes    Date: 6/26/2025     Chief complaint: Diabetes (T2DM Followup , fingersticks no concerns /Last A1c 7.8 3/2025, A1c due /Eye exam 6/2024/Foot exam 4/2024)       Subjective:   History of Present Illness  Teresa De Jesus Alanis Reyes is a 44 year old female with diabetes who presents for follow-up of her blood sugar control.    Her current A1c is 7.7, slightly improved from a previous level of 7.8, but still above the target range of 6.5 to 7. She monitors her blood glucose levels in the morning before eating, with readings typically around 129 to 130, and the highest being 161. In the evenings, two hours postprandial, her levels range from 165 to 170.    Her current medication regimen includes 40 units of Lantus insulin in the morning and 30 units of Humalog insulin before each meal, which she takes twice daily with lunch and dinner. She also takes metformin, two tablets in the morning and two at night. She was previously taking 14 mg of Rybelsus but discontinued it a week ago due to pharmacy coverage issues.    Her diet includes special considerations for her diabetes, focusing on vegetables, limited meat, and avoiding sodas and juices. 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    No heartburn, nausea, vomiting, or diarrhea. Occasional constipation, which has improved over time.    DM meds at office visit:   Diabetic Medications              RYBELSUS 14 MG Oral Tab (Taking) TAKE 1 TABLET BY MOUTH DAILY    metFORMIN  MG Oral Tablet 24 Hr (Taking) Take 2 tablets (1,000 mg total) by mouth daily with breakfast AND 2 tablets (1,000 mg total) daily with dinner.    Insulin Lispro, 1 Unit Dial, 100 UNIT/ML Subcutaneous Solution Pen-injector (Taking) Take 30u before each meal. USE AS DIRECTED BEFORE MEALS UP TO TOTAL DAILY DOSE  UNITS    empagliflozin (JARDIANCE) 10 MG  Oral Tab (Taking) Take 1 tablet (10 mg total) by mouth daily.    insulin glargine (LANTUS SOLOSTAR) 100 UNIT/ML Subcutaneous Solution Pen-injector (Taking) Inject 40 Units into the skin 2 (two) times daily. Inject up to 80 units daily               History/Other:    Allergies, PMH, SocHx and FHx reviewed and updated as appropriate in Epic on Current Medications[1]  Allergies[2]  Current Medications[3]  Past Medical History[4]  Family History[5]  Social history: Reviewed.      ROS/Exam    REVIEW OF SYSTEMS: Ten point review of systems has been performed and is otherwise negative and/or non-contributory, except as described above.     VITALS  Vitals:    06/26/25 0846   BP: 140/80   Pulse: 67   SpO2: 97%   Weight: 186 lb (84.4 kg)   Height: 5' 5\" (1.651 m)       Body mass index is 30.95 kg/m².  Wt Readings from Last 6 Encounters:   06/26/25 186 lb (84.4 kg)   04/07/25 186 lb 9.6 oz (84.6 kg)   12/31/24 183 lb (83 kg)   11/04/24 186 lb (84.4 kg)   10/07/24 188 lb 9.6 oz (85.5 kg)   06/25/24 187 lb (84.8 kg)       PHYSICAL EXAM  CONSTITUTIONAL:  awake, alert, cooperative, no apparent distress, and appears stated age    PSYCH: normal affect  LUNGS: breathing comfortably  CARDIOVASCULAR:  regular rate   NECK:  no thyromegaly    Labs/Imaging: Pertinent imaging reviewed.    4/2025 midnight salivary test 0.1, 0.045  6/26/2025 A1c 7.7    Thyroid labs (if present in chart):  Recent Labs     06/01/24  1113 06/26/25  0942   T4F  --  1.4   TSH 0.797 1.621        Thyroid ultrasound results (if present in chart):  No results found.    Overall glucose control:  Lab Results   Component Value Date    A1C 7.9 (H) 06/26/2025    A1C 7.7 (A) 06/26/2025    A1C 7.8 (H) 03/27/2025    A1C 7.2 (A) 03/27/2025    A1C 6.6 (A) 10/07/2024       Supplementary Documentation:   -Surveillance for Diabetes Complications & Risks  Foot exam/neuropathy: Last Foot Exam: 03/27/25    Retinopathy screening: No data recordedNo data recorded  Lipid screening:    Lab Results   Component Value Date     (H) 03/27/2025    TRIG 217 (H) 03/27/2025   Cholesterol Meds: rosuvastatin Tabs - 5 MG      Nephropathy screening:   Lab Results   Component Value Date    EGFRCR 43 (L) 06/26/2025    MICROALBCREA 644.7 (H) 03/27/2025   No results found for: \"CREAUR\", \"MICROALBUMIN\", \"MALBCREACALC\"  Blood pressure control:   BP Readings from Last 1 Encounters:   06/26/25 140/80   BP Meds: amLODIPine Tabs - 10 MG; hydroCHLOROthiazide Tabs - 25 MG; losartan Tabs - 100 MG; metoprolol succinate ER Tb24 - 100 MG; spironolactone Tabs - 50 MG       Assessment & Plan:   Overview:   1. Microalbuminuric diabetic nephropathy (HCC) (Primary)  2. Hyperlipidemia associated with type 2 diabetes mellitus (HCC)  -     Lipid Panel; Future; Expected date: 09/01/2025  3. Type 2 diabetes mellitus with hyperglycemia, with long-term current use of insulin ([facility])  -     POC Hemoglobin A1C  4. Controlled type 2 diabetes mellitus with hyperglycemia, with long-term current use of insulin ([facility])  Overview:  She has a hx of T2DM since 2019 and was started on insulin at that time. Her weight based insulin dosing is around 42-50 units for TDD. She is currently receiving around 3x that dose + oral agents. We reviewed other etiology of diabetes including cushing's. Completed DST, which was abnormal. MS x2  still pending.  Patient not interested in CGM.        Metformin  mg - Take 2 pills twice daily with meals  Pioglitazone 30  Soliqua 15 (started 2/22/2023)  Novolog vial- 15 units before meals 3x daily  A1c 8.6% 5/2020 at [facility]  Orders:  -     Rybelsus; Take 1 tablet by mouth daily.  Dispense: 90 tablet; Refill: 3  5. Type 2 diabetes mellitus with microalbuminuria, with long-term current use of insulin ([facility])  -     Discontinue: Insulin Lispro (1 Unit Dial); Inject 1-40 Units into the skin 3 (three) times daily with meals. Take 30u-35u before each meal. USE AS DIRECTED BEFORE MEALS UP TO TOTAL DAILY DOSE  UNITS   Dispense: 30 mL; Refill: 1  -     Lantus SoloStar; Inject 44 Units into the skin 2 (two) times daily. Inject up to 88 units daily  Dispense: 45 mL; Refill: 11      Assessment & Plan  Type 2 Diabetes Mellitus with Hyperglycemia  Type 2 diabetes mellitus with suboptimal glycemic control. Current A1c is 7.7%, slightly improved from 7.8%, but still above the target range of 6.5-7%.  Per memory, fasting blood glucose levels range from 129 to 161 mg/dL, and postprandial levels are around 165 mg/dL. 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  Current regimen: Lantus 40 units twice daily, Humalog 30 units 3 times daily   Since we do not have any data at this time but A1c remains above goal, we reviewed increasing both long and short acting insulin since patient notes compliance with current regimen.  Discussed initiation of CGM and patient politely declined.  - Increase Lantus to 44 units in the morning and evening.  - Increase Humalog to 34 units before meals.  - Implement a sliding scale for insulin: add 1 unit for every 40 mg/dL above 140 mg/dL before meals.  - Continue metformin 1000 mg twice daily and Rybelsus 14 mg daily.  - Advise dietary modifications to reduce carbohydrate intake, especially bread.  - Instruct to contact the clinic if blood glucose levels are consistently greater than 250 or less than 70 for further adjustments.    Hyperlipidemia  Hyperlipidemia with intolerance to statins, specifically rosuvastatin, due to muscle cramps and pain. Current lipid levels are slightly elevated. Potential for improved lipid levels with better glycemic control and dietary modifications.  - Advise a low-fat diet to help manage lipid levels.  - Plan to repeat lipid panel in September.    Microalbuminuria  March 2025 testing with elevated urine microalbumin  - Discuss potential introduction of Jardiance in the future if glycemic control  improves    Updated DM med list:   Diabetic Medications              insulin glargine (LANTUS SOLOSTAR) 100 UNIT/ML Subcutaneous Solution Pen-injector (Taking) Inject 44 Units into the skin 2 (two) times daily. Inject up to 88 units daily    RYBELSUS 14 MG Oral Tab (Taking) Take 1 tablet by mouth daily.    metFORMIN  MG Oral Tablet 24 Hr (Taking) Take 2 tablets (1,000 mg total) by mouth daily with breakfast AND 2 tablets (1,000 mg total) daily with dinner.    empagliflozin (JARDIANCE) 10 MG Oral Tab (Taking) Take 1 tablet (10 mg total) by mouth daily.          See above header \"Supplementary Documentation\" for surveillance for diabetes complications & risks    Return in about 3 months (around 9/26/2025).    Judith Rivera DO  Endocrinology, Diabetes & Metabolism   6/26/2025    Note to patient: The 21 Century Cures Act makes medical notes like these available to patients in the interest of transparency. However, be advised this is a medical document. It is intended as peer to peer communication. It is written in medical language and may contain abbreviations or verbiage that are unfamiliar. It may appear blunt or direct. Medical documents are intended to carry relevant information, facts as evident, and the clinical opinion of the practitioner.          [1]    [DISCONTINUED] Insulin Lispro, 1 Unit Dial, 100 UNIT/ML Subcutaneous Solution Pen-injector Inject 1-40 Units into the skin 3 (three) times daily with meals. Take 30u-35u before each meal. USE AS DIRECTED BEFORE MEALS UP TO TOTAL DAILY DOSE  UNITS 30 mL 1    insulin glargine (LANTUS SOLOSTAR) 100 UNIT/ML Subcutaneous Solution Pen-injector Inject 44 Units into the skin 2 (two) times daily. Inject up to 88 units daily 45 mL 11    RYBELSUS 14 MG Oral Tab Take 1 tablet by mouth daily. 90 tablet 3    losartan 100 MG Oral Tab Take 1 tablet (100 mg total) by mouth daily. 90 tablet 3    hydroCHLOROthiazide 25 MG Oral Tab Take 1 tablet (25 mg total) by  mouth daily. 90 tablet 3    rosuvastatin 5 MG Oral Tab Take 1 tablet (5 mg total) by mouth nightly. 90 tablet 3    metFORMIN  MG Oral Tablet 24 Hr Take 2 tablets (1,000 mg total) by mouth daily with breakfast AND 2 tablets (1,000 mg total) daily with dinner.      empagliflozin (JARDIANCE) 10 MG Oral Tab Take 1 tablet (10 mg total) by mouth daily. 90 tablet 3    SPIRONOLACTONE 50 MG Oral Tab TAKE 1 TABLET(50 MG) BY MOUTH DAILY 90 tablet 3    Insulin Pen Needle 32G X 4 MM Does not apply Misc Use for insulin  each 2    metoprolol succinate  MG Oral Tablet 24 Hr Take 1 tablet (100 mg total) by mouth daily. 90 tablet 3    Glucose Blood (ONETOUCH VERIO) In Vitro Strip Check glucose at least 4x daily. 100 strip 11    OneTouch Delica Lancets 33G Does not apply Misc Check glucose 4x daily before insulin injections. 100 each 11    AMLODIPINE 10 MG Oral Tab TAKE 1 TABLET(10 MG) BY MOUTH DAILY 90 tablet 3    Insulin Syringe-Needle U-100 31G X 15/64\" 1 ML Does not apply Misc 1 Syringe 3 (three) times daily before meals. 300 each 1    potassium citrate 10 MEQ (1080 MG) Oral Tab CR Take 2 tablets (20 mEq total) by mouth 2 (two) times a day. 180 tablet 3   [2]   Allergies  Allergen Reactions    Peanut Butter Flavor SWELLING     Lips swell   [3]   Current Outpatient Medications   Medication Sig Dispense Refill    insulin glargine (LANTUS SOLOSTAR) 100 UNIT/ML Subcutaneous Solution Pen-injector Inject 44 Units into the skin 2 (two) times daily. Inject up to 88 units daily 45 mL 11    RYBELSUS 14 MG Oral Tab Take 1 tablet by mouth daily. 90 tablet 3    losartan 100 MG Oral Tab Take 1 tablet (100 mg total) by mouth daily. 90 tablet 3    hydroCHLOROthiazide 25 MG Oral Tab Take 1 tablet (25 mg total) by mouth daily. 90 tablet 3    rosuvastatin 5 MG Oral Tab Take 1 tablet (5 mg total) by mouth nightly. 90 tablet 3    metFORMIN  MG Oral Tablet 24 Hr Take 2 tablets (1,000 mg total) by mouth daily with breakfast AND  2 tablets (1,000 mg total) daily with dinner.      empagliflozin (JARDIANCE) 10 MG Oral Tab Take 1 tablet (10 mg total) by mouth daily. 90 tablet 3    SPIRONOLACTONE 50 MG Oral Tab TAKE 1 TABLET(50 MG) BY MOUTH DAILY 90 tablet 3    Insulin Pen Needle 32G X 4 MM Does not apply Misc Use for insulin  each 2    metoprolol succinate  MG Oral Tablet 24 Hr Take 1 tablet (100 mg total) by mouth daily. 90 tablet 3    Glucose Blood (ONETOUCH VERIO) In Vitro Strip Check glucose at least 4x daily. 100 strip 11    OneTouch Delica Lancets 33G Does not apply Misc Check glucose 4x daily before insulin injections. 100 each 11    AMLODIPINE 10 MG Oral Tab TAKE 1 TABLET(10 MG) BY MOUTH DAILY 90 tablet 3    Insulin Syringe-Needle U-100 31G X 15/64\" 1 ML Does not apply Misc 1 Syringe 3 (three) times daily before meals. 300 each 1    potassium citrate 10 MEQ (1080 MG) Oral Tab CR Take 2 tablets (20 mEq total) by mouth 2 (two) times a day. 180 tablet 3    ergocalciferol 1.25 MG (28029 UT) Oral Cap Take 1 tab every week for 3 months, then take over the counter vitamin D3 2000 International units/day daily for maitenance 13 capsule 0    Insulin Lispro, 1 Unit Dial, 100 UNIT/ML Subcutaneous Solution Pen-injector Inject 1-35 Units into the skin 3 (three) times daily with meals. Take 30u-35u before each meal. USE AS DIRECTED BEFORE MEALS UP TO TOTAL DAILY DOSE  UNITS 30 mL 1   [4]   Past Medical History:   Calculus of kidney    Diabetes (HCC)    High blood pressure    High serum cortisol    History of blood transfusion   [5]   Family History  Problem Relation Age of Onset    Diabetes Mother     Diabetes Sister

## 2025-06-26 NOTE — PROGRESS NOTES
The following individual(s) verbally consented to be recorded using ambient AI listening technology and understand that they can each withdraw their consent to this listening technology at any point by asking the clinician to turn off or pause the recording:    Patient name: Teresa De Jesus Alanis Reyes

## 2025-06-26 NOTE — TELEPHONE ENCOUNTER
Received a call from [facility] pharmacy, spoke to Vinita KATZ, needs clarification for Insulin Lispro directions:    Inject 1-40 Units into the skin 3 (three) times daily with meals. Take 30u-35u before each meal. USE AS DIRECTED BEFORE MEALS UP TO TOTAL.    Routed for review.

## 2025-07-01 ENCOUNTER — PATIENT OUTREACH (OUTPATIENT)
Dept: FAMILY MEDICINE CLINIC | Facility: CLINIC | Age: 45
End: 2025-07-01

## 2025-07-02 LAB
SEX HORM BIND GLOB: 15.4 NMOL/L
TESTOST % FREE+WEAK BND: 37.1 %
TESTOST FREE+WEAK BND: 11.9 NG/DL
TESTOSTERONE TOT /MS: 32 NG/DL

## 2025-08-11 ENCOUNTER — TELEPHONE (OUTPATIENT)
Facility: CLINIC | Age: 45
End: 2025-08-11

## (undated) DIAGNOSIS — E11.65 CONTROLLED TYPE 2 DIABETES MELLITUS WITH HYPERGLYCEMIA, WITH LONG-TERM CURRENT USE OF INSULIN (HCC): ICD-10-CM

## (undated) DIAGNOSIS — Z79.4 CONTROLLED TYPE 2 DIABETES MELLITUS WITH HYPERGLYCEMIA, WITH LONG-TERM CURRENT USE OF INSULIN (HCC): ICD-10-CM

## (undated) DEVICE — RIGID PNEUMATIC PROBE: Brand: RIGID PNEUMATIC PROBE

## (undated) DEVICE — TUBING CYSTO

## (undated) DEVICE — ABDOMINAL PAD: Brand: DERMACEA

## (undated) DEVICE — TIGERTAIL 5F FLXTIP 70CM

## (undated) DEVICE — ULTRASOUND PROBE: Brand: ULTRASOUND PROBE

## (undated) DEVICE — Device

## (undated) DEVICE — CYSTO CDS-LF: Brand: MEDLINE INDUSTRIES, INC.

## (undated) DEVICE — KENDALL SCD EXPRESS SLEEVES, KNEE LENGTH, MEDIUM: Brand: KENDALL SCD

## (undated) DEVICE — STERILE POLYISOPRENE POWDER-FREE SURGICAL GLOVES: Brand: PROTEXIS

## (undated) DEVICE — LITHOTRIPTER SRG STON CTCH TBG

## (undated) DEVICE — GAUZE SPONGES,12 PLY: Brand: CURITY

## (undated) DEVICE — BAG DRAIN INFECTION CNTRL 2000

## (undated) DEVICE — SUTURE SILK 0 FSL

## (undated) DEVICE — SEAL Y BIOPSY PORT P6R SCOPE

## (undated) DEVICE — CATH FOLEY COUNCIL 18FR 5CC

## (undated) DEVICE — SYRINGE 30ML LL TIP

## (undated) DEVICE — ZIPWIRE GUIDE .038X150 STR/STF

## (undated) DEVICE — URETERAL ACCESS SHEATH SET: Brand: NAVIGATOR HD

## (undated) DEVICE — PERCUTANEUOS NEPHROLOGY CDS: Brand: MEDLINE INDUSTRIES, INC.

## (undated) DEVICE — 3M(TM) MICROPORE TAPE DISPENSER 1535-2: Brand: 3M™ MICROPORE™

## (undated) DEVICE — COVER,BOOT,FOAM,NON-SKID,HOOK-LOOP,XLG: Brand: MEDLINE INDUSTRIES, INC.

## (undated) DEVICE — SOL  .9 1000ML BTL

## (undated) DEVICE — SUTURE SILK 3-0 X-1

## (undated) DEVICE — ZIPWIRE GUIDEWIRE .035X150 STR

## (undated) DEVICE — HIGH PRESSURE NEPHROSTOMY BALLOON CATHETER KIT: Brand: NEPHROMAX KIT

## (undated) DEVICE — SOL H2O 1000ML BTL

## (undated) DEVICE — MOSES 200 FIBER

## (undated) DEVICE — SOL  .9 3000ML

## (undated) DEVICE — SYSTEM PUMPING SINGLE ACTION

## (undated) DEVICE — MEDI-VAC NON-CONDUCTIVE SUCTION TUBING: Brand: CARDINAL HEALTH

## (undated) DEVICE — SET GRAVITY W 3 VALVE AND PORT

## (undated) DEVICE — PROBE KIT TRIOLOGY 3.9X440

## (undated) DEVICE — DUAL LUMEN URETERAL CATHETER

## (undated) NOTE — LETTER
[facility] [address], [address]  Consent for Procedure/Sedation    Date: 8/26/2020   Time:1030      I authorize the performance upon Rosa Trejo the following: Nephrostomy Tube Exchange, Wire Insertion    1.  I a Delroy Vickers        : 10/15/1980       Medical Record #: [id number]

## (undated) NOTE — LETTER
[address]  [address], [address] [facility]  Authorization for Surgical Operation and Procedure     Date:___________                                                                                                         Time:__________ potential risks that can occur: fever and allergic reactions, hemolytic reactions, transmission of diseases such as Hepatitis, AIDS and Cytomegalovirus (CMV) and fluid overload.   In the event that I wish to have an autologous transfusion of my own blood, o will determine when the applicable recovery period ends for purposes of reinstating the DNAR order.   10. Patients having a sterilization procedure: I understand that if the procedure is successful the results will be permanent and it will therefore be impo anesthesiologist (anesthesia doctor) to give me medicine and do additional procedures as necessary.  Some examples are: Starting or using an “IV” to give me medicine, fluids or blood during my procedure, and having a breathing tube placed to help me breathe “epidural”, & “nerve blocks”): I understand that rare but potential complications include headache, bleeding, infection, seizure, irregular heart rhythms, and nerve injury.     I can change my mind about having anesthesia services at any time before I get

## (undated) NOTE — LETTER
To Whom It May Concern:    Sheila Rose has been under our care regarding ongoing medical issues. Because of this, she has been required to restrict her physical activities.     She may resume her usual activities, including work, on [employer]

## (undated) NOTE — LETTER
To Whom It May Concern:    Nash Streeter has been under our care regarding ongoing medical issues. Because of this, she has been required to restrict her physical activities.     She may resume her usual activities, including work, on 9/11/

## (undated) NOTE — LETTER
[facility] [address], [address]  Consent for Procedure/Sedation    Date: 7/22/2020    Time: 1200      1.  I authorize the performance upon Wayne Ames the following:  Pre-Op Left Nephrostomy or wire placement Cheli Campos [place]        : 10/15/1980       Medical Record #: [id number]

## (undated) NOTE — LETTER
DECLINATION FORM  [address] provides interpreters for patients who are deaf, hearing impaired, or have Limited [place] Proficiency in order to ensure these patients an equal opportunity to benefit from intranet  *For all other languages, please complete this form with the assistance of a telephone .       confirmation number: ___________________________    Patient Name: Masha Ramirezkolemckayla     : 10/15/1980    Page 1 of 1

## (undated) NOTE — LETTER
To Whom It May Concern:    Mathew Randhawa has been under our care regarding ongoing medical issues. Because of this, she is scheduled for outpatient surgery on 8/26/20.   Her return to work is pending the outcome and recovery from this surge

## (undated) NOTE — LETTER
To Whom It May Concern:    Terri Wilson has been under my care regarding ongoing medical issues. Because of this, she will be off work from 7/22/20 - 8/22/20. Please feel free to contact us if there are any questions.       Sincerely,